# Patient Record
Sex: MALE | Race: WHITE | NOT HISPANIC OR LATINO | Employment: UNEMPLOYED | URBAN - NONMETROPOLITAN AREA
[De-identification: names, ages, dates, MRNs, and addresses within clinical notes are randomized per-mention and may not be internally consistent; named-entity substitution may affect disease eponyms.]

---

## 2017-01-05 ENCOUNTER — TRANSCRIBE ORDERS (OUTPATIENT)
Dept: ADMINISTRATIVE | Facility: HOSPITAL | Age: 37
End: 2017-01-05

## 2017-01-05 DIAGNOSIS — R06.02 SHORTNESS OF BREATH: Primary | ICD-10-CM

## 2017-01-09 ENCOUNTER — HOSPITAL ENCOUNTER (OUTPATIENT)
Dept: CARDIOLOGY | Facility: HOSPITAL | Age: 37
Discharge: HOME | End: 2017-01-09
Attending: INTERNAL MEDICINE | Admitting: INTERNAL MEDICINE

## 2017-01-09 DIAGNOSIS — R06.02 SHORTNESS OF BREATH: ICD-10-CM

## 2017-01-09 LAB
BH CV ECHO MEAS - ACS: 2.3 CM
BH CV ECHO MEAS - AO ROOT AREA (BSA CORRECTED): 1.6
BH CV ECHO MEAS - AO ROOT AREA: 9.1 CM^2
BH CV ECHO MEAS - AO ROOT DIAM: 3.4 CM
BH CV ECHO MEAS - BSA(HAYCOCK): 2.2 M^2
BH CV ECHO MEAS - BSA: 2.2 M^2
BH CV ECHO MEAS - BZI_BMI: 27.9 KILOGRAMS/M^2
BH CV ECHO MEAS - BZI_METRIC_HEIGHT: 182.9 CM
BH CV ECHO MEAS - BZI_METRIC_WEIGHT: 93.4 KG
BH CV ECHO MEAS - CONTRAST EF 4CH: 45 ML/M^2
BH CV ECHO MEAS - EDV(MOD-SP4): 109 ML
BH CV ECHO MEAS - EF(MOD-SP4): 45 %
BH CV ECHO MEAS - ESV(MOD-SP4): 60 ML
BH CV ECHO MEAS - LA DIMENSION: 3.9 CM
BH CV ECHO MEAS - LA/AO: 1.1
BH CV ECHO MEAS - LV DIASTOLIC VOL/BSA (35-75): 50.5 ML/M^2
BH CV ECHO MEAS - LV SYSTOLIC VOL/BSA (12-30): 27.8 ML/M^2
BH CV ECHO MEAS - LVLD AP4: 8.4 CM
BH CV ECHO MEAS - LVLS AP4: 7.6 CM
BH CV ECHO MEAS - LVOT AREA (M): 3.5 CM^2
BH CV ECHO MEAS - LVOT AREA: 3.3 CM^2
BH CV ECHO MEAS - LVOT DIAM: 2.1 CM
BH CV ECHO MEAS - MV A MAX VEL: 59.7 CM/SEC
BH CV ECHO MEAS - MV E MAX VEL: 55.3 CM/SEC
BH CV ECHO MEAS - MV E/A: 0.93
BH CV ECHO MEAS - PA ACC SLOPE: 2004 CM/SEC^2
BH CV ECHO MEAS - PA ACC TIME: 0.06 SEC
BH CV ECHO MEAS - PA PR(ACCEL): 50.5 MMHG
BH CV ECHO MEAS - SI(MOD-SP4): 22.7 ML/M^2
BH CV ECHO MEAS - SV(MOD-SP4): 49 ML
LV EF 2D ECHO EST: 55 %

## 2017-01-09 PROCEDURE — 93306 TTE W/DOPPLER COMPLETE: CPT

## 2017-01-09 PROCEDURE — 93306 TTE W/DOPPLER COMPLETE: CPT | Performed by: INTERNAL MEDICINE

## 2017-09-22 ENCOUNTER — APPOINTMENT (OUTPATIENT)
Dept: GENERAL RADIOLOGY | Facility: HOSPITAL | Age: 37
End: 2017-09-22

## 2017-09-22 ENCOUNTER — HOSPITAL ENCOUNTER (EMERGENCY)
Facility: HOSPITAL | Age: 37
Discharge: ANOTHER HOSPITAL | End: 2017-09-23
Attending: EMERGENCY MEDICINE | Admitting: EMERGENCY MEDICINE

## 2017-09-22 DIAGNOSIS — I21.4 NSTEMI (NON-ST ELEVATED MYOCARDIAL INFARCTION) (HCC): Primary | ICD-10-CM

## 2017-09-22 PROBLEM — R07.89 ATYPICAL CHEST PAIN: Status: ACTIVE | Noted: 2017-09-22

## 2017-09-22 LAB
ALBUMIN SERPL-MCNC: 4 G/DL (ref 3.5–5)
ALBUMIN/GLOB SERPL: 1.5 G/DL (ref 1.5–2.5)
ALP SERPL-CCNC: 121 U/L (ref 40–129)
ALT SERPL W P-5'-P-CCNC: 33 U/L (ref 10–44)
ANION GAP SERPL CALCULATED.3IONS-SCNC: 7.9 MMOL/L (ref 3.6–11.2)
AST SERPL-CCNC: 33 U/L (ref 10–34)
BASOPHILS # BLD AUTO: 0.04 10*3/MM3 (ref 0–0.3)
BASOPHILS NFR BLD AUTO: 0.4 % (ref 0–2)
BILIRUB SERPL-MCNC: 0.5 MG/DL (ref 0.2–1.8)
BUN BLD-MCNC: 13 MG/DL (ref 7–21)
BUN/CREAT SERPL: 11.1 (ref 7–25)
CALCIUM SPEC-SCNC: 9.1 MG/DL (ref 7.7–10)
CHLORIDE SERPL-SCNC: 101 MMOL/L (ref 99–112)
CHOLEST SERPL-MCNC: 185 MG/DL (ref 0–200)
CO2 SERPL-SCNC: 25.1 MMOL/L (ref 24.3–31.9)
CREAT BLD-MCNC: 1.17 MG/DL (ref 0.43–1.29)
DEPRECATED RDW RBC AUTO: 41.8 FL (ref 37–54)
EOSINOPHIL # BLD AUTO: 0.32 10*3/MM3 (ref 0–0.7)
EOSINOPHIL NFR BLD AUTO: 3.6 % (ref 0–5)
ERYTHROCYTE [DISTWIDTH] IN BLOOD BY AUTOMATED COUNT: 12.4 % (ref 11.5–14.5)
GFR SERPL CREATININE-BSD FRML MDRD: 69 ML/MIN/1.73
GLOBULIN UR ELPH-MCNC: 2.6 GM/DL
GLUCOSE BLD-MCNC: 104 MG/DL (ref 70–110)
HCT VFR BLD AUTO: 44.3 % (ref 42–52)
HDLC SERPL-MCNC: 30 MG/DL (ref 60–100)
HGB BLD-MCNC: 14.7 G/DL (ref 14–18)
HOLD SPECIMEN: NORMAL
HOLD SPECIMEN: NORMAL
IMM GRANULOCYTES # BLD: 0.02 10*3/MM3 (ref 0–0.03)
IMM GRANULOCYTES NFR BLD: 0.2 % (ref 0–0.5)
LDLC SERPL CALC-MCNC: 81 MG/DL (ref 0–100)
LDLC/HDLC SERPL: 2.69 {RATIO}
LYMPHOCYTES # BLD AUTO: 3.34 10*3/MM3 (ref 1–3)
LYMPHOCYTES NFR BLD AUTO: 37.6 % (ref 21–51)
MCH RBC QN AUTO: 31.1 PG (ref 27–33)
MCHC RBC AUTO-ENTMCNC: 33.2 G/DL (ref 33–37)
MCV RBC AUTO: 93.9 FL (ref 80–94)
MONOCYTES # BLD AUTO: 0.77 10*3/MM3 (ref 0.1–0.9)
MONOCYTES NFR BLD AUTO: 8.7 % (ref 0–10)
NEUTROPHILS # BLD AUTO: 4.4 10*3/MM3 (ref 1.4–6.5)
NEUTROPHILS NFR BLD AUTO: 49.5 % (ref 30–70)
OSMOLALITY SERPL CALC.SUM OF ELEC: 268.7 MOSM/KG (ref 273–305)
PLATELET # BLD AUTO: 277 10*3/MM3 (ref 130–400)
PMV BLD AUTO: 9.8 FL (ref 6–10)
POTASSIUM BLD-SCNC: 4.1 MMOL/L (ref 3.5–5.3)
PROT SERPL-MCNC: 6.6 G/DL (ref 6–8)
RBC # BLD AUTO: 4.72 10*6/MM3 (ref 4.7–6.1)
SODIUM BLD-SCNC: 134 MMOL/L (ref 135–153)
TRIGL SERPL-MCNC: 372 MG/DL (ref 0–150)
TROPONIN I SERPL-MCNC: 0.01 NG/ML
TROPONIN I SERPL-MCNC: 0.21 NG/ML
TROPONIN I SERPL-MCNC: 1.22 NG/ML
VLDLC SERPL-MCNC: 74.4 MG/DL
WBC NRBC COR # BLD: 8.89 10*3/MM3 (ref 4.5–12.5)
WHOLE BLOOD HOLD SPECIMEN: NORMAL
WHOLE BLOOD HOLD SPECIMEN: NORMAL

## 2017-09-22 PROCEDURE — 80053 COMPREHEN METABOLIC PANEL: CPT | Performed by: EMERGENCY MEDICINE

## 2017-09-22 PROCEDURE — 71010 HC CHEST PA OR AP: CPT

## 2017-09-22 PROCEDURE — 96375 TX/PRO/DX INJ NEW DRUG ADDON: CPT

## 2017-09-22 PROCEDURE — 93010 ELECTROCARDIOGRAM REPORT: CPT | Performed by: INTERNAL MEDICINE

## 2017-09-22 PROCEDURE — 25010000002 HYDROMORPHONE PER 4 MG: Performed by: EMERGENCY MEDICINE

## 2017-09-22 PROCEDURE — 84484 ASSAY OF TROPONIN QUANT: CPT | Performed by: EMERGENCY MEDICINE

## 2017-09-22 PROCEDURE — 25010000002 ENOXAPARIN PER 10 MG

## 2017-09-22 PROCEDURE — 85025 COMPLETE CBC W/AUTO DIFF WBC: CPT | Performed by: EMERGENCY MEDICINE

## 2017-09-22 PROCEDURE — 71010 XR CHEST 1 VW: CPT | Performed by: RADIOLOGY

## 2017-09-22 PROCEDURE — 96374 THER/PROPH/DIAG INJ IV PUSH: CPT

## 2017-09-22 PROCEDURE — 96376 TX/PRO/DX INJ SAME DRUG ADON: CPT

## 2017-09-22 PROCEDURE — 25010000002 MORPHINE PER 10 MG: Performed by: EMERGENCY MEDICINE

## 2017-09-22 PROCEDURE — 93005 ELECTROCARDIOGRAM TRACING: CPT | Performed by: EMERGENCY MEDICINE

## 2017-09-22 PROCEDURE — 80061 LIPID PANEL: CPT | Performed by: EMERGENCY MEDICINE

## 2017-09-22 PROCEDURE — 25010000002 KETOROLAC TROMETHAMINE PER 15 MG: Performed by: EMERGENCY MEDICINE

## 2017-09-22 PROCEDURE — 99285 EMERGENCY DEPT VISIT HI MDM: CPT

## 2017-09-22 PROCEDURE — 96372 THER/PROPH/DIAG INJ SC/IM: CPT

## 2017-09-22 PROCEDURE — 36415 COLL VENOUS BLD VENIPUNCTURE: CPT

## 2017-09-22 RX ORDER — KETOROLAC TROMETHAMINE 30 MG/ML
15 INJECTION, SOLUTION INTRAMUSCULAR; INTRAVENOUS ONCE
Status: COMPLETED | OUTPATIENT
Start: 2017-09-22 | End: 2017-09-22

## 2017-09-22 RX ORDER — SODIUM CHLORIDE 0.9 % (FLUSH) 0.9 %
10 SYRINGE (ML) INJECTION AS NEEDED
Status: DISCONTINUED | OUTPATIENT
Start: 2017-09-22 | End: 2017-09-23 | Stop reason: HOSPADM

## 2017-09-22 RX ORDER — CIPROFLOXACIN HYDROCHLORIDE 3.5 MG/ML
2 SOLUTION/ DROPS TOPICAL DAILY PRN
Status: ON HOLD | COMMUNITY
End: 2018-01-01

## 2017-09-22 RX ORDER — CARVEDILOL 6.25 MG/1
6.25 TABLET ORAL 2 TIMES DAILY
COMMUNITY
End: 2018-01-01 | Stop reason: HOSPADM

## 2017-09-22 RX ORDER — ASPIRIN 325 MG
325 TABLET ORAL ONCE
Status: COMPLETED | OUTPATIENT
Start: 2017-09-22 | End: 2017-09-22

## 2017-09-22 RX ORDER — TRAZODONE HYDROCHLORIDE 50 MG/1
50 TABLET ORAL NIGHTLY PRN
Status: ON HOLD | COMMUNITY
End: 2018-01-01

## 2017-09-22 RX ORDER — OMEGA-3 FATTY ACIDS/FISH OIL 300-1000MG
1 CAPSULE ORAL 2 TIMES DAILY
Status: ON HOLD | COMMUNITY
End: 2018-01-01

## 2017-09-22 RX ORDER — DIAZEPAM 10 MG/1
10 TABLET ORAL NIGHTLY PRN
COMMUNITY

## 2017-09-22 RX ORDER — BUPROPION HYDROCHLORIDE 300 MG/1
300 TABLET ORAL DAILY
COMMUNITY

## 2017-09-22 RX ORDER — CLOPIDOGREL BISULFATE 75 MG/1
300 TABLET ORAL ONCE
Status: COMPLETED | OUTPATIENT
Start: 2017-09-22 | End: 2017-09-22

## 2017-09-22 RX ORDER — CLOPIDOGREL BISULFATE 75 MG/1
75 TABLET ORAL DAILY
COMMUNITY
End: 2017-09-23 | Stop reason: HOSPADM

## 2017-09-22 RX ORDER — OLOPATADINE HYDROCHLORIDE 1 MG/ML
1 SOLUTION/ DROPS OPHTHALMIC DAILY PRN
COMMUNITY

## 2017-09-22 RX ORDER — HYDROMORPHONE HYDROCHLORIDE 1 MG/ML
0.5 INJECTION, SOLUTION INTRAMUSCULAR; INTRAVENOUS; SUBCUTANEOUS ONCE
Status: COMPLETED | OUTPATIENT
Start: 2017-09-22 | End: 2017-09-22

## 2017-09-22 RX ADMIN — KETOROLAC TROMETHAMINE 15 MG: 30 INJECTION, SOLUTION INTRAMUSCULAR at 15:32

## 2017-09-22 RX ADMIN — HYDROMORPHONE HYDROCHLORIDE 0.5 MG: 1 INJECTION, SOLUTION INTRAMUSCULAR; INTRAVENOUS; SUBCUTANEOUS at 19:55

## 2017-09-22 RX ADMIN — ASPIRIN 325 MG: 325 TABLET ORAL at 10:20

## 2017-09-22 RX ADMIN — CLOPIDOGREL BISULFATE 300 MG: 75 TABLET, FILM COATED ORAL at 19:32

## 2017-09-22 RX ADMIN — ENOXAPARIN SODIUM 120 MG: 60 INJECTION SUBCUTANEOUS at 19:33

## 2017-09-22 RX ADMIN — NITROGLYCERIN 1 INCH: 20 OINTMENT TOPICAL at 15:52

## 2017-09-22 RX ADMIN — MORPHINE SULFATE 4 MG: 4 INJECTION, SOLUTION INTRAMUSCULAR; INTRAVENOUS at 10:56

## 2017-09-22 RX ADMIN — MORPHINE SULFATE 4 MG: 4 INJECTION, SOLUTION INTRAMUSCULAR; INTRAVENOUS at 15:52

## 2017-09-22 RX ADMIN — MORPHINE SULFATE 4 MG: 4 INJECTION, SOLUTION INTRAMUSCULAR; INTRAVENOUS at 21:50

## 2017-09-22 NOTE — NURSING NOTE
ACC REVIEW REPORT: [facility]        PATIENT NAME: Paulie Jordan    PATIENT ID: [id number]    BED: N605    BED TYPE: TELEMETRY    BED GIVEN TO:  SOHAIL MURRAY RN    TIME BED GIVEN:  19:22    YOB: 1980    AGE:  37    GENDER:  MALE    PREVIOUS ADMIT TO [facility]:  YES    PREVIOUS ADMISSION DATE:      PATIENT CLASS:  OUTPATIENT    TODAY'S DATE: 9/22/2017    TRANSFER DATE: 09/22/2017    ETA:  22:30    TRANSFERRING FACILITY:  [facility]    TRANSFERRING FACILITY PHONE # :  [facility] NUMBER:  [phone]       EMERGENCY DEPARTMENT NUMBER:   [phone]    TRANSFERRING MD:  DR. RAPHAEL HERRERA    DATE/TIME REQUEST RECEIVED:  09/22/2017 @ 18:44    [facility] RN:  ABBI MILLER RN     REPORT FROM:  SOHAIL MURRAY RN    TIME REPORT TAKEN: 19:00     DIAGNOSIS:  NSTEMI    REASON FOR TRANSFER TO [facility]:  CARDIOLOGIST NOT ON CALL, PT IS POSITIVE FOR A NSTEMI    TRANSPORTATION:  LOCAL EMS    CLINICAL REASON FOR TRANSFER TO [facility]:  Pt presented this a.m. With c/o bilateral arm pain x 3 hours,  states he took 4 nitroglycerin S.L. At home and had no relief.  He has a long history of cardiac disease, the first troponin was negative at 10:29. Case Management was consulted, the patient uses a quad cane, wheelchair, and shower chair at home.  He lives next door to his parents.  The second troponin was 0.215 at 14:45.  Patient continues to state he has no chest pain.  THE 3rd TROPONIN WAS POSITIVE @ 1.125 AT 17:29.      CLINICAL INFORMATION    HEIGHT:  72 inches    WEIGHT:  270 lbs    ALLERGIES:  BENEDRYL    WRIGHT:  NKDA    INFECTIOUS DISEASE:  NONE    ISOLATION:  NONE    1ST VITAL SIGNS:   TIME:  19:00  TEMP: 97.8   PULSE: 73   B/P:  141/92  RESP:  18,  99% SAT ON ROOM AIR.    LAST VITAL SIGNS:  TIME:    TEMP:    PULSE:    B/P:    RESP:      LAB INFORMATION:  ,  K+ 4.1, CHLORIDE 101, BUN 13, Hgb. 14.7,  Hct 44.3,  WBC 8.89,  PLTS.  277.     CULTURE INFORMATION:  No cultures have been drawn      MEDS/IV FLUIDS:  IV ACCESS PER A  # 20 GA ANGIO IN THE LEFT HAND TO SALINE LOCK.                                   MEDICATIONS;  10:15   mg p.o.   10:49 MORPHINE 4 mg IV,  10:51  O2 @ 2L/NC,  15:26 TORADOL 15mg IV,  15:40 NITROSTAT 1 INCH TO CHEST WALL,  15:40 MORPHINE 4 mg IV.    CARDIAC SYSTEM:    CHEST PAIN:  PATIENT DESCRIBED THE PAIN AS BILATERAL ARM PAIN, NOT CHEST PAIN    RATE:  Pt. Did not rate his arm pain.     SCALE:      RHYTHM:  NORMAL SINUS RHYTHM     EKG READING @ 09:53  NSR SEPTAL INFARCT, AGE INDERTERMINTE    Is patient taking or has patient been given any drugs that could increase bleeding?    (Plavix, Brilinta, Effient, Eliquis, Xarelto, Warfarin, Integrilin, Angiomax)    DRUG:       DOSE/FREQUENCY:      CARDIAC ENZYMES:    DATE:    TIME:    CK:    CKMB:    RITA:    TROP:      DATE:    TIME:    CK:    CKMB:    RITA:    TROP:        HEART CATH:  08/22/2016 WITH DR. PITTMAN AT [facility] CATH LAB    HEART CATH DATE:  08/22/2016    HEART CATH RESULTS:  CORONARY STENT PLACEMENT  LAD:    RCA:    CX:    LMAIN:     EF:      SWAN: NO    SITE:    SIZE:     DATE INSERTED:       ARTLINE: NO    SITE:    SIZE:    DATE INSERTED:      SHEATH:  NO    SITE:    SIZE:    DATE INSERTED:          VASOSEAL:  NO    SITE:    DATE INSERTED:      EXTERNAL PACEMAKER: NO    RATE:    EXT PACER ON:      MODE:  N/A    DATE INSERTED:    OUTPUT SETTING:    SENSITIVITY SETTING:    SENSITIVITY TYPE:      IABP:  NO    SITE:    AUG PRESSURE:     DATE INSERTED:      CARDIAC NOTES:  THIS PT HAS A POSITIVE CARDIAC HISTORY;  CARDIAC CATH 08/2016 RESULTING IN CORONARY STENT PLACEMENT,  HYPERCHOLESTEROLEMIA,  HTN,  GOUT,  LEUKEMIA, BONE MARROW TRANSPLANT, 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       RESPIRATORY SYSTEM:    LUNG SOUNDS:    CLEAR:  YES  CRACKLES:    WHEEZES:    RHONCHI:    DIMINISHED:      ABG DATE:  N/A        ABG TIME:      ABG RESULTS:    PH:    PO2:    PCO2:    HCO3:    O2 SAT:        ETT:  NO    ETT SIZE:      ORAL:      NASAL:      SECURED AT  MEASUREMENT (CM):       ON VENTILATOR:  NO    TV:    FI02:    RATE:    PEEP:      OXYGEN:  YES    O2 SAT:  97-98 SAT ON ROOM AIR    ADMINISTRATION ROUTE:  2L/NC    IMAGING FINDINGS:      PNEUMO LOCATION:      PNEUMO SIZE:      PNEUMO CHEST TUBE SEAL TYPE:      RADIOLOGY RESULTS:      RESPIRATORY STATUS:        CNS/MUSCULOSKELETAL    ALERT AND ORIENTED:    PERSON:  YES  PLACE: YES  TIME:  YES    INJURY:  WHERE:      ELIECER COMA SCALE:    E:  4  M:  6  V:  5    STROKE SCALE:  N/A    SIZE OF HEMORRHAGE:      SYMPTOMS: (CHOOSE APPROPRIATE)    ASPHASIA:    ATAXIA:    DYSARTHRIA:    DYSPHASIA:    HEADACHE:    PARALYSIS:    SEIZURE:    SYNCOPE:    VERTIGO:    VISION CHANGE:         EXTREMITY WEAKNESS:  NONE    LEFT ARM:    RIGHT ARM:    LEFT LEG:    RIGHT LEG:      CAT SCAN RESULTS:      MRI RESULTS:      CNS/MUSCULOSKELETAL NOTES:  THE PATIENT IS ALERT AND ORIENTED, HE IS COOPERATIVE WITH STAFF AND CONVERSES APPROPRIATELY.       GI//GY      ABDOMINAL PAIN:  NO    VOMITING:  NO    DIARRHEA:  NO    NAUSEA:  NO    BOWEL SOUNDS:  ACTIVE    OCCULT STOOL:  N/A    VAGINAL BLEEDING:  N/A    TESTICULAR PAIN:  NO    HEMATURIA:  NO    NG TUBE:  NO    SIZE:    DATE INSERTED:        ULTRASOUND:  NOT DONE    ULTRASOUND RESULTS:        ACUTE ABDOMEN:  NO    ACUTE ABDOMEN RESULTS:        CT SCAN:  NO    CT SCAN RESULTS:        GI//GY NOTES:  THE PATIENT IS ON A REGULAR DIET.      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     OTHER SYMPTOM NOTES:      ADDITIONAL NOTES:  Pt is a  male, he has never smoked or used smokeless tobacco products, he uses no alcohol products and denies any recreational drug use.           Jael Enamorado RN  9/22/2017  7:21 PM

## 2017-09-23 ENCOUNTER — APPOINTMENT (OUTPATIENT)
Dept: CARDIOLOGY | Facility: HOSPITAL | Age: 37
End: 2017-09-23
Attending: INTERNAL MEDICINE

## 2017-09-23 ENCOUNTER — APPOINTMENT (OUTPATIENT)
Dept: NUCLEAR MEDICINE | Facility: HOSPITAL | Age: 37
End: 2017-09-23

## 2017-09-23 ENCOUNTER — HOSPITAL ENCOUNTER (INPATIENT)
Facility: HOSPITAL | Age: 37
LOS: 1 days | Discharge: HOME | End: 2017-09-23
Attending: FAMILY MEDICINE | Admitting: HOSPITALIST

## 2017-09-23 VITALS
BODY MASS INDEX: 38.78 KG/M2 | HEIGHT: 71 IN | DIASTOLIC BLOOD PRESSURE: 85 MMHG | WEIGHT: 277 LBS | OXYGEN SATURATION: 97 % | RESPIRATION RATE: 16 BRPM | SYSTOLIC BLOOD PRESSURE: 112 MMHG | TEMPERATURE: 97.5 F | HEART RATE: 72 BPM

## 2017-09-23 VITALS
HEART RATE: 71 BPM | WEIGHT: 270 LBS | DIASTOLIC BLOOD PRESSURE: 94 MMHG | TEMPERATURE: 97.7 F | BODY MASS INDEX: 36.57 KG/M2 | HEIGHT: 72 IN | SYSTOLIC BLOOD PRESSURE: 132 MMHG | OXYGEN SATURATION: 99 % | RESPIRATION RATE: 18 BRPM

## 2017-09-23 DIAGNOSIS — I21.4 NSTEMI (NON-ST ELEVATED MYOCARDIAL INFARCTION) (HCC): Primary | ICD-10-CM

## 2017-09-23 PROBLEM — Z85.6 HISTORY OF LEUKEMIA: Status: ACTIVE | Noted: 2017-09-23

## 2017-09-23 PROBLEM — T45.1X5A NEUROPATHY DUE TO CHEMOTHERAPEUTIC DRUG (HCC): Status: ACTIVE | Noted: 2017-09-23

## 2017-09-23 PROBLEM — G62.0 NEUROPATHY DUE TO CHEMOTHERAPEUTIC DRUG (HCC): Status: ACTIVE | Noted: 2017-09-23

## 2017-09-23 LAB
ANION GAP SERPL CALCULATED.3IONS-SCNC: 13 MMOL/L (ref 3–11)
BUN BLD-MCNC: 13 MG/DL (ref 9–23)
BUN/CREAT SERPL: 10.8 (ref 7–25)
CA-I SERPL ISE-MCNC: 1.14 MMOL/L (ref 1.12–1.32)
CALCIUM SPEC-SCNC: 8.9 MG/DL (ref 8.7–10.4)
CHLORIDE SERPL-SCNC: 101 MMOL/L (ref 99–109)
CO2 SERPL-SCNC: 22 MMOL/L (ref 20–31)
CREAT BLD-MCNC: 1.2 MG/DL (ref 0.6–1.3)
DEPRECATED RDW RBC AUTO: 43 FL (ref 37–54)
ERYTHROCYTE [DISTWIDTH] IN BLOOD BY AUTOMATED COUNT: 12.5 % (ref 11.3–14.5)
GFR SERPL CREATININE-BSD FRML MDRD: 67 ML/MIN/1.73
GLUCOSE BLD-MCNC: 102 MG/DL (ref 70–100)
HCT VFR BLD AUTO: 45.5 % (ref 38.9–50.9)
HGB BLD-MCNC: 15.5 G/DL (ref 13.1–17.5)
MAGNESIUM SERPL-MCNC: 1.8 MG/DL (ref 1.3–2.7)
MCH RBC QN AUTO: 31.9 PG (ref 27–31)
MCHC RBC AUTO-ENTMCNC: 34.1 G/DL (ref 32–36)
MCV RBC AUTO: 93.6 FL (ref 80–99)
PLATELET # BLD AUTO: 254 10*3/MM3 (ref 150–450)
PMV BLD AUTO: 9.8 FL (ref 6–12)
POTASSIUM BLD-SCNC: 3.6 MMOL/L (ref 3.5–5.5)
RBC # BLD AUTO: 4.86 10*6/MM3 (ref 4.2–5.76)
SODIUM BLD-SCNC: 136 MMOL/L (ref 132–146)
TROPONIN I SERPL-MCNC: 3.47 NG/ML
TROPONIN I SERPL-MCNC: 4.4 NG/ML
TSH SERPL DL<=0.05 MIU/L-ACNC: 4.08 MIU/ML (ref 0.35–5.35)
WBC NRBC COR # BLD: 7.09 10*3/MM3 (ref 3.5–10.8)

## 2017-09-23 PROCEDURE — 93005 ELECTROCARDIOGRAM TRACING: CPT | Performed by: NURSE PRACTITIONER

## 2017-09-23 PROCEDURE — 25010000002 MORPHINE SULFATE (PF) 2 MG/ML SOLUTION: Performed by: HOSPITALIST

## 2017-09-23 PROCEDURE — 25010000002 FENTANYL CITRATE (PF) 100 MCG/2ML SOLUTION: Performed by: INTERNAL MEDICINE

## 2017-09-23 PROCEDURE — B2151ZZ FLUOROSCOPY OF LEFT HEART USING LOW OSMOLAR CONTRAST: ICD-10-PCS | Performed by: INTERNAL MEDICINE

## 2017-09-23 PROCEDURE — 93458 L HRT ARTERY/VENTRICLE ANGIO: CPT | Performed by: INTERNAL MEDICINE

## 2017-09-23 PROCEDURE — A9540 TC99M MAA: HCPCS | Performed by: HOSPITALIST

## 2017-09-23 PROCEDURE — 93010 ELECTROCARDIOGRAM REPORT: CPT | Performed by: INTERNAL MEDICINE

## 2017-09-23 PROCEDURE — 25010000002 HEPARIN (PORCINE) PER 1000 UNITS: Performed by: INTERNAL MEDICINE

## 2017-09-23 PROCEDURE — C1769 GUIDE WIRE: HCPCS | Performed by: INTERNAL MEDICINE

## 2017-09-23 PROCEDURE — 82330 ASSAY OF CALCIUM: CPT | Performed by: NURSE PRACTITIONER

## 2017-09-23 PROCEDURE — S0260 H&P FOR SURGERY: HCPCS | Performed by: INTERNAL MEDICINE

## 2017-09-23 PROCEDURE — 0 TECHNETIUM ALBUMIN AGGREGATED: Performed by: HOSPITALIST

## 2017-09-23 PROCEDURE — C1894 INTRO/SHEATH, NON-LASER: HCPCS | Performed by: INTERNAL MEDICINE

## 2017-09-23 PROCEDURE — 0 IOPAMIDOL PER 1 ML: Performed by: INTERNAL MEDICINE

## 2017-09-23 PROCEDURE — 84443 ASSAY THYROID STIM HORMONE: CPT | Performed by: NURSE PRACTITIONER

## 2017-09-23 PROCEDURE — A9558 XE133 XENON 10MCI: HCPCS | Performed by: HOSPITALIST

## 2017-09-23 PROCEDURE — 99236 HOSP IP/OBS SAME DATE HI 85: CPT | Performed by: HOSPITALIST

## 2017-09-23 PROCEDURE — 84484 ASSAY OF TROPONIN QUANT: CPT | Performed by: EMERGENCY MEDICINE

## 2017-09-23 PROCEDURE — 4A023N7 MEASUREMENT OF CARDIAC SAMPLING AND PRESSURE, LEFT HEART, PERCUTANEOUS APPROACH: ICD-10-PCS | Performed by: INTERNAL MEDICINE

## 2017-09-23 PROCEDURE — 25010000002 MIDAZOLAM PER 1 MG: Performed by: INTERNAL MEDICINE

## 2017-09-23 PROCEDURE — 0 XENON XE 133: Performed by: HOSPITALIST

## 2017-09-23 PROCEDURE — B2111ZZ FLUOROSCOPY OF MULTIPLE CORONARY ARTERIES USING LOW OSMOLAR CONTRAST: ICD-10-PCS | Performed by: INTERNAL MEDICINE

## 2017-09-23 PROCEDURE — 78582 LUNG VENTILAT&PERFUS IMAGING: CPT

## 2017-09-23 PROCEDURE — 85027 COMPLETE CBC AUTOMATED: CPT | Performed by: NURSE PRACTITIONER

## 2017-09-23 PROCEDURE — 80048 BASIC METABOLIC PNL TOTAL CA: CPT | Performed by: NURSE PRACTITIONER

## 2017-09-23 PROCEDURE — 83735 ASSAY OF MAGNESIUM: CPT | Performed by: NURSE PRACTITIONER

## 2017-09-23 PROCEDURE — 84484 ASSAY OF TROPONIN QUANT: CPT | Performed by: NURSE PRACTITIONER

## 2017-09-23 RX ORDER — CITALOPRAM 40 MG/1
40 TABLET ORAL DAILY
Status: DISCONTINUED | OUTPATIENT
Start: 2017-09-23 | End: 2017-09-23 | Stop reason: HOSPADM

## 2017-09-23 RX ORDER — ASPIRIN 325 MG
325 TABLET ORAL DAILY
Status: DISCONTINUED | OUTPATIENT
Start: 2017-09-23 | End: 2017-09-23

## 2017-09-23 RX ORDER — ISOSORBIDE MONONITRATE 60 MG/1
60 TABLET, EXTENDED RELEASE ORAL DAILY
Status: DISCONTINUED | OUTPATIENT
Start: 2017-09-23 | End: 2017-09-23 | Stop reason: HOSPADM

## 2017-09-23 RX ORDER — ASPIRIN 81 MG/1
81 TABLET, CHEWABLE ORAL DAILY
Status: DISCONTINUED | OUTPATIENT
Start: 2017-09-23 | End: 2017-09-23 | Stop reason: HOSPADM

## 2017-09-23 RX ORDER — ISOSORBIDE MONONITRATE 30 MG/1
30 TABLET, EXTENDED RELEASE ORAL DAILY
Status: DISCONTINUED | OUTPATIENT
Start: 2017-09-23 | End: 2017-09-23

## 2017-09-23 RX ORDER — ROSUVASTATIN CALCIUM 20 MG/1
40 TABLET, COATED ORAL DAILY
Qty: 30 TABLET | Refills: 11 | Status: SHIPPED | OUTPATIENT
Start: 2017-09-23 | End: 2018-01-01 | Stop reason: HOSPADM

## 2017-09-23 RX ORDER — OXYCODONE HYDROCHLORIDE AND ACETAMINOPHEN 5; 325 MG/1; MG/1
1 TABLET ORAL EVERY 6 HOURS PRN
Status: DISCONTINUED | OUTPATIENT
Start: 2017-09-23 | End: 2017-09-23 | Stop reason: HOSPADM

## 2017-09-23 RX ORDER — FENTANYL CITRATE 50 UG/ML
INJECTION, SOLUTION INTRAMUSCULAR; INTRAVENOUS AS NEEDED
Status: DISCONTINUED | OUTPATIENT
Start: 2017-09-23 | End: 2017-09-23 | Stop reason: HOSPADM

## 2017-09-23 RX ORDER — CARVEDILOL 6.25 MG/1
6.25 TABLET ORAL EVERY 12 HOURS SCHEDULED
Status: DISCONTINUED | OUTPATIENT
Start: 2017-09-23 | End: 2017-09-23 | Stop reason: HOSPADM

## 2017-09-23 RX ORDER — SODIUM CHLORIDE 0.9 % (FLUSH) 0.9 %
1-10 SYRINGE (ML) INJECTION AS NEEDED
Status: DISCONTINUED | OUTPATIENT
Start: 2017-09-23 | End: 2017-09-23 | Stop reason: HOSPADM

## 2017-09-23 RX ORDER — MORPHINE SULFATE 2 MG/ML
2 INJECTION, SOLUTION INTRAMUSCULAR; INTRAVENOUS
Status: DISCONTINUED | OUTPATIENT
Start: 2017-09-23 | End: 2017-09-23

## 2017-09-23 RX ORDER — CLOPIDOGREL BISULFATE 75 MG/1
75 TABLET ORAL DAILY
Status: DISCONTINUED | OUTPATIENT
Start: 2017-09-23 | End: 2017-09-23

## 2017-09-23 RX ORDER — ROSUVASTATIN CALCIUM 20 MG/1
20 TABLET, COATED ORAL DAILY
Status: DISCONTINUED | OUTPATIENT
Start: 2017-09-23 | End: 2017-09-23

## 2017-09-23 RX ORDER — ONDANSETRON 2 MG/ML
4 INJECTION INTRAMUSCULAR; INTRAVENOUS EVERY 6 HOURS PRN
Status: DISCONTINUED | OUTPATIENT
Start: 2017-09-23 | End: 2017-09-23 | Stop reason: HOSPADM

## 2017-09-23 RX ORDER — BUPROPION HYDROCHLORIDE 150 MG/1
300 TABLET ORAL DAILY
Status: DISCONTINUED | OUTPATIENT
Start: 2017-09-23 | End: 2017-09-23 | Stop reason: HOSPADM

## 2017-09-23 RX ORDER — MIDAZOLAM HYDROCHLORIDE 1 MG/ML
INJECTION INTRAMUSCULAR; INTRAVENOUS AS NEEDED
Status: DISCONTINUED | OUTPATIENT
Start: 2017-09-23 | End: 2017-09-23 | Stop reason: HOSPADM

## 2017-09-23 RX ORDER — PANTOPRAZOLE SODIUM 40 MG/1
40 TABLET, DELAYED RELEASE ORAL EVERY MORNING
Status: DISCONTINUED | OUTPATIENT
Start: 2017-09-23 | End: 2017-09-23 | Stop reason: HOSPADM

## 2017-09-23 RX ORDER — MORPHINE SULFATE 30 MG/1
30 TABLET, FILM COATED, EXTENDED RELEASE ORAL EVERY 12 HOURS SCHEDULED
Status: DISCONTINUED | OUTPATIENT
Start: 2017-09-23 | End: 2017-09-23 | Stop reason: HOSPADM

## 2017-09-23 RX ORDER — ISOSORBIDE MONONITRATE 30 MG/1
60 TABLET, EXTENDED RELEASE ORAL DAILY
Qty: 30 TABLET | Refills: 6 | Status: SHIPPED | OUTPATIENT
Start: 2017-09-23 | End: 2018-01-01 | Stop reason: HOSPADM

## 2017-09-23 RX ORDER — FEBUXOSTAT 40 MG/1
40 TABLET, FILM COATED ORAL DAILY
Status: DISCONTINUED | OUTPATIENT
Start: 2017-09-23 | End: 2017-09-23 | Stop reason: HOSPADM

## 2017-09-23 RX ORDER — LIDOCAINE HYDROCHLORIDE 10 MG/ML
INJECTION, SOLUTION INFILTRATION; PERINEURAL AS NEEDED
Status: DISCONTINUED | OUTPATIENT
Start: 2017-09-23 | End: 2017-09-23 | Stop reason: HOSPADM

## 2017-09-23 RX ORDER — ROSUVASTATIN CALCIUM 20 MG/1
40 TABLET, COATED ORAL DAILY
Status: DISCONTINUED | OUTPATIENT
Start: 2017-09-23 | End: 2017-09-23 | Stop reason: HOSPADM

## 2017-09-23 RX ORDER — SODIUM CHLORIDE 9 MG/ML
100 INJECTION, SOLUTION INTRAVENOUS CONTINUOUS
Status: ACTIVE | OUTPATIENT
Start: 2017-09-23 | End: 2017-09-23

## 2017-09-23 RX ORDER — ONDANSETRON 4 MG/1
4 TABLET, FILM COATED ORAL EVERY 6 HOURS PRN
Status: DISCONTINUED | OUTPATIENT
Start: 2017-09-23 | End: 2017-09-23 | Stop reason: HOSPADM

## 2017-09-23 RX ORDER — LEVOTHYROXINE SODIUM 0.05 MG/1
50 TABLET ORAL DAILY
Status: DISCONTINUED | OUTPATIENT
Start: 2017-09-23 | End: 2017-09-23 | Stop reason: HOSPADM

## 2017-09-23 RX ORDER — NITROGLYCERIN 20 MG/100ML
5-200 INJECTION INTRAVENOUS
Status: DISCONTINUED | OUTPATIENT
Start: 2017-09-23 | End: 2017-09-23

## 2017-09-23 RX ADMIN — BUPROPION HYDROCHLORIDE 300 MG: 150 TABLET, FILM COATED, EXTENDED RELEASE ORAL at 08:36

## 2017-09-23 RX ADMIN — OXYCODONE AND ACETAMINOPHEN 1 TABLET: 5; 325 TABLET ORAL at 10:26

## 2017-09-23 RX ADMIN — Medication 1 DOSE: at 14:56

## 2017-09-23 RX ADMIN — MORPHINE SULFATE 30 MG: 30 TABLET, EXTENDED RELEASE ORAL at 08:37

## 2017-09-23 RX ADMIN — LEVOTHYROXINE SODIUM 50 MCG: 50 TABLET ORAL at 04:19

## 2017-09-23 RX ADMIN — ROSUVASTATIN CALCIUM 40 MG: 20 TABLET, FILM COATED ORAL at 08:35

## 2017-09-23 RX ADMIN — FEBUXOSTAT 40 MG: 40 TABLET ORAL at 10:27

## 2017-09-23 RX ADMIN — CITALOPRAM HYDROBROMIDE 40 MG: 40 TABLET ORAL at 08:36

## 2017-09-23 RX ADMIN — TICAGRELOR 180 MG: 90 TABLET ORAL at 08:36

## 2017-09-23 RX ADMIN — ISOSORBIDE MONONITRATE 60 MG: 60 TABLET, EXTENDED RELEASE ORAL at 08:36

## 2017-09-23 RX ADMIN — SODIUM CHLORIDE 100 ML/HR: 9 INJECTION, SOLUTION INTRAVENOUS at 10:26

## 2017-09-23 RX ADMIN — OXYCODONE AND ACETAMINOPHEN 1 TABLET: 5; 325 TABLET ORAL at 16:52

## 2017-09-23 RX ADMIN — MORPHINE SULFATE 2 MG: 2 INJECTION, SOLUTION INTRAMUSCULAR; INTRAVENOUS at 04:20

## 2017-09-23 RX ADMIN — ASPIRIN 81 MG 81 MG: 81 TABLET ORAL at 08:36

## 2017-09-23 RX ADMIN — XENON XE-133 18.44 MILLICURIE: 10 GAS RESPIRATORY (INHALATION) at 14:30

## 2017-09-23 RX ADMIN — CARVEDILOL 6.25 MG: 6.25 TABLET, FILM COATED ORAL at 04:19

## 2017-09-23 RX ADMIN — NITROGLYCERIN 5 MCG/MIN: 20 INJECTION INTRAVENOUS at 04:21

## 2017-09-23 RX ADMIN — PANTOPRAZOLE SODIUM 40 MG: 40 TABLET, DELAYED RELEASE ORAL at 04:20

## 2017-09-23 NOTE — CONSULTS
Date of Hospital Visit: 17  Encounter Provider: Devorah Hightower MD  Place of Service: [facility]  Patient Name: Paulie Jordan  :1980  Referral Provider: No ref. provider found  Primary Care Provider: Cesar Neri MD    Chief complaint/Reason for Consultation: Non-ST elevation MI     Problem List:  1. Coronary artery disease:  1. Hospitalization 2016 found to be NSTEMI  2. Cardiac catheterization 2016: Single vessel- CAD with total ostial occlusion of LAD. PTCA/stent of LAD with 3.5 x 33 mm ALISHA reducing 100% stenosis to 0%. EF 40-45%   3. Echocardiogram 2016: EF 55%. Trace MR. Trace TR.  4. ER presentation with anginal symptoms 2017, ruled in for non-ST elevation MI.  2. Hypertension   3. Hyperlipidemia   4. Neuropathy, secondary to Chemotherapy   5. History of Leukemia  1. Diagnosed  with remission , S/P chemo and radiation  2. S/P Bone Marrow Transplant   3. Wheelchair bound due to spinal involvement  6. Hypothyroid, on chronic replacement therapy.  7. IBS   8. Depression    History of Present Illness:  The patient is a pleasant 37-year-old male with above-mentioned medical history.  He is well known to me.  Patient states that he was in his usual state of health until yesterday morning when shortly after waking up he started experiencing left-sided upper arm pain which was quite intense and progressive.  He took up to 4 sublingual nitroglycerin tablets with significant relief.  He contacted his PCP office and was advised to go to the emergency department for further evaluation.  Upon arrival to the ER he did not have any ongoing arm pain or chest discomfort however they decided to trend his troponins and observe him in the ER where he started to rule in.  We were contacted in the evening requesting transfer.  At this time I recommended reloading with Plavix and full dose of Lovenox.  Patient was subsequently transferred to [facility]  [facility] in the early hours of the morning.  Reportedly upon arrival he was complaining of very mild retrosternal discomfort and left arm discomfort and was started on low-dose nitroglycerin IV.  Subsequently his symptoms completely resolved.  He has rested well and denies any ongoing chest pain arm pain or shortness of breath this morning.  His lifestyle is sedentary, he is disabled due to his history of leukemia  and participate in minor household chores.  Review of systems is negative for recent fever chills abdominal pain nausea vomiting diarrhea constipation or urinary symptoms.  No symptoms of stroke.  All other review of systems are also negative.    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       Past Surgical History:   Procedure Laterality Date   • BONE MARROW TRANSPLANT     • CARDIAC CATHETERIZATION N/A 8/22/2016    Procedure: Left Heart Cath;  Surgeon: Devorah Hightower MD;  Location: [facility] CATH INVASIVE LOCATION;  Service:    • CORONARY STENT PLACEMENT         Prescriptions Prior to Admission   Medication Sig Dispense Refill Last Dose   • aspirin 325 MG tablet Take 1 tablet by mouth daily.   9/22/2017 at 0700   • buPROPion XL (WELLBUTRIN XL) 300 MG 24 hr tablet Take 300 mg by mouth Daily.   9/22/2017 at 0700   • carvedilol (COREG) 6.25 MG tablet Take 6.25 mg by mouth Every 12 (Twelve) Hours.   9/22/2017 at 0700   • ciprofloxacin (CILOXAN) 0.3 % ophthalmic solution Administer 2 drops to both eyes Daily As Needed.   Unknown at Unknown time   • citalopram (CeleXA) 40 MG tablet Take 40 mg by mouth daily.   9/22/2017 at 0700   • clopidogrel (PLAVIX) 75 MG tablet Take 75 mg by mouth Daily.   9/22/2017 at 0700   • diazePAM (VALIUM) 10 MG tablet Take 10 mg by mouth Every Night.   9/21/2017 at PM   • febuxostat (ULORIC) 40 MG tablet Take 40 mg by mouth daily.   9/22/2017 at 0700   • isosorbide  mononitrate (IMDUR) 30 MG 24 hr tablet Take 1 tablet by mouth daily. 30 tablet 6 9/22/2017 at 0700   • levothyroxine (SYNTHROID, LEVOTHROID) 50 MCG tablet Take 50 mcg by mouth daily.   9/22/2017 at 0700   • morphine (MS CONTIN) 30 MG 12 hr tablet Take 30 mg by mouth 2 (Two) Times a Day.   9/22/2017 at 0700   • nitroglycerin (NITROSTAT) 0.4 MG SL tablet 1 under the tongue as needed for angina, may repeat q5mins for up three doses (Patient taking differently: Place 0.4 mg under the tongue Every 5 (Five) Minutes As Needed. 1 under the tongue as needed for angina, may repeat q5mins for up three doses) 100 tablet 3 Unknown at Unknown time   • olopatadine (PATANOL) 0.1 % ophthalmic solution Administer 1 drop to both eyes Daily As Needed for Allergies.   Unknown at Unknown time   • Omega 3 1000 MG capsule Take 1 capsule by mouth 2 (Two) Times a Day.   9/22/2017 at 0700   • omeprazole (PriLOSEC) 20 MG capsule Take 20 mg by mouth daily.   9/22/2017 at 0700   • rosuvastatin (CRESTOR) 20 MG tablet Take 20 mg by mouth Daily.   9/22/2017 at 0700   • traZODone (DESYREL) 50 MG tablet Take 50 mg by mouth At Night As Needed for Sleep.   9/20/2017 at PM       Social History     Social History   • Marital status:      Spouse name: N/A   • Number of children: N/A   • Years of education: N/A     Occupational History   • Not on file.     Social History Main Topics   • Smoking status: Never Smoker   • Smokeless tobacco: Not on file   • Alcohol use No   • Drug use: No   • Sexual activity: Not on file     Other Topics Concern   • Not on file     Social History Narrative   • No narrative on file       REVIEW OF SYSTEMS:   ROS  12 point ROS was performed and is Negative except as outlined in HPI    Objective:     Vitals:    09/23/17 0211 09/23/17 0215 09/23/17 0419 09/23/17 0445   BP: 146/84 136/79 137/99 119/85   BP Location: Left arm Right arm     Pulse: 85  77 80   Resp: 20   20   Temp: 97.8 °F (36.6 °C)   97.8 °F (36.6 °C)  "  Temrc: Oral   Oral   SpO2: 99%      Weight: 277 lb (126 kg)      Height: 71\" (180.3 cm)        Body mass index is 38.63 kg/(m^2).  Flowsheet Rows         First Filed Value    Admission Height  71\" (180.3 cm) Documented at 09/23/2017 0211    Admission Weight  277 lb (126 kg) Documented at 09/23/2017 0211          Physical Exam   General: No acute distress, well-developed and well-nourished.    Skin: Skin is warm and dry. No obvious cyanosis, erythema or pallor.   HEENT: Atraumatic, normocephalic, no conjunctival pallor, no scleral icterus.   Neck: Supple, no JVD. Normal carotid upstrokes, no bruits.    Chest:No respiratory distres No chest wall tenderness. Breath sounds are normal. No wheezes,  rhonchi or rales.  Cardiovascular: Normal S1 and S2, no murmer, gallop or rub. PMI is not displaced.    Pulses:Radial and pedal pulses are 2+ and symmetric although left radial is slightly weaker than the right.. Right Waqar test is normal.    Abdomen: Soft, non tender, normal bowel sounds.   Musculoskeletal/Extremities:  No clubbing, cyanosis or edema. No gross deformity.   Neurological: Alert and oriented to person, place, and time, no gross focal deficits.   Psychiatric: Normal mood and affect.Speech and behavior is normal.    Lab Review:                  Results from last 7 days  Lab Units 09/22/17  1029   SODIUM mmol/L 134*   POTASSIUM mmol/L 4.1   CHLORIDE mmol/L 101   CO2 mmol/L 25.1   BUN mg/dL 13   CREATININE mg/dL 1.17   GLUCOSE mg/dL 104   CALCIUM mg/dL 9.1       Results from last 7 days  Lab Units 09/23/17  0010 09/22/17  1729 09/22/17  1445   TROPONIN I ng/mL 3.466* 1.215* 0.215*       Results from last 7 days  Lab Units 09/23/17  0450   WBC 10*3/mm3 7.09   HEMOGLOBIN g/dL 15.5   HEMATOCRIT % 45.5   PLATELETS 10*3/mm3 254               Results from last 7 days  Lab Units 09/22/17  1729   CHOLESTEROL mg/dL 185   TRIGLYCERIDES mg/dL 372*   HDL CHOL mg/dL 30*       EKG:Normal sinus rhythm, poor R-wave " progression, no acute ST/T changes.      Assessment:   Patient with known CAD, status post previous MI and stenting of the LAD now presents with recurrent ACS and ruled in for non-ST elevation MI.  Hypertension.  Dyslipidemia with persistent hypertriglyceridemia.  Obesity.    Plan:   1. He received subcutaneous Lovenox and was reloaded with Plavix.  We will continue dual antiplatelet therapy and rest of the current medications.  2. Left heart catheterization was recommended for further evaluation of coronary anatomy, this will be followed by further intervention depending on findings.  3. The procedure was explained to the patient/family extensively. Indications, benefits, risks and alternatives were discussed. The patient understands well, and wishes to proceed.   4. Further management to be based on findings of the procedure and clinical course.  5. Thank you for this consultation.    Devorah Hightower MD, FACC, [facility]

## 2017-09-23 NOTE — DISCHARGE SUMMARY
HOSPITAL MEDICINE DISCHARGE SUMMARY    Date of Admission: 9/23/2017  Date of Discharge:  9/23/2017    Discharge Diagnoses:  Principal Problem:    NSTEMI (non-ST elevated myocardial infarction)  Active Problems:    Hypertension    Hypercholesterolemia    Coronary artery disease involving native coronary artery of native heart    Cardiomyopathy    History of leukemia    Neuropathy due to chemotherapeutic drug      Presenting Problem/History of Present Illness from admission  Patient is a 37 year old male, who presented to [facility] with complaints of chest pain. Patient reports that his pain started yesterday afternoon. He rated the pain at a 4/10, with nothing improving the pain, and nothing making the pain worse, and the pain radiated to his left arm. Patient denies shortness of breath or abdominal pain. He reports that he took 4 nitroglycerin tablets and the pain did not improve, so he was advised by his PCP to seek further evaluation in the emergency department. While there, patient was found to have elevated troponins 0.215, 1.215, & 3.466. Additionally, patient has a past medical history of NSTEMI (with 1 stent 8/16), HTN, hyperlipidemia, IBS, and history of leukemia (remission since 2000). Patient was transferred to [facility], and will be admitted to the hospital medicine service for further evaluation and treatment.     Discharge Day HPI:  Pt reports feeling well. Off of nitro gtt, he reports mild retrosternal chest discomfort. No radiation. Does have increased chronic pain from his leukemia, he reports.     Hospital Course    Pt was admitted for NSTEMI and underwent LHC on 9/23 and was found to have non-obstructive CAD with previous LAD stent patent. Anti-anginal medications adjusted- asa at low-dose, increased dose of Imdur. Plavix changed to ticagrelor. Pt provided with physical scripts upon discharge. V/Q scan obtained to eval for possible PE with low-probability.      Procedures Performed  Procedure(s):  Left Heart Cath       Consults:   Consults     Date and Time Order Name Status Description    9/23/2017 0327 Inpatient Consult to Cardiology Completed           Pertinent Test Results:   Lab Results (last 7 days)     Procedure Component Value Units Date/Time    CBC (No Diff) [[id number]]  (Abnormal) Collected:  09/23/17 0450    Specimen:  Blood Updated:  09/23/17 0640     WBC 7.09 10*3/mm3      RBC 4.86 10*6/mm3      Hemoglobin 15.5 g/dL      Hematocrit 45.5 %      MCV 93.6 fL      MCH 31.9 (H) pg      MCHC 34.1 g/dL      RDW 12.5 %      RDW-SD 43.0 fl      MPV 9.8 fL      Platelets 254 10*3/mm3     TSH [[id number]]  (Normal) Collected:  09/23/17 0450    Specimen:  Blood Updated:  09/23/17 0701     TSH 4.081 mIU/mL     Magnesium [[id number]]  (Normal) Collected:  09/23/17 0450    Specimen:  Blood Updated:  09/23/17 0701     Magnesium 1.8 mg/dL     Calcium, Ionized [[id number]]  (Normal) Collected:  09/23/17 0450    Specimen:  Blood Updated:  09/23/17 0834     Ionized Calcium 1.14 mmol/L     Troponin [[id number]]  (Abnormal) Collected:  09/23/17 0450    Specimen:  Blood Updated:  09/23/17 0913     Troponin I 4.403 (C) ng/mL     Basic Metabolic Panel [[id number]]  (Abnormal) Collected:  09/23/17 0450    Specimen:  Blood Updated:  09/23/17 0947     Glucose 102 (H) mg/dL      BUN 13 mg/dL      Creatinine 1.20 mg/dL      Sodium 136 mmol/L      Potassium 3.6 mmol/L      Chloride 101 mmol/L      CO2 22.0 mmol/L      Calcium 8.9 mg/dL      eGFR Non African Amer 67 mL/min/1.73      BUN/Creatinine Ratio 10.8     Anion Gap 13.0 (H) mmol/L     Narrative:       [employer] Guidelines    Stage     Description        GFR  1         Normal or High     90+  2         Mild decrease      60-89  3         Moderate decrease  30-59  4         Severe decrease    15-29  5         Kidney failure     <15        Imaging Results (all)     Procedure Component Value Units Date/Time    NM Lung  "Ventilation Perfusion [[id number]] Collected:  09/23/17 1528     Updated:  09/23/17 1534    Narrative:       EXAMINATION: NM LUNG VENTILATION PERFUSION-     INDICATION: elevated troponin and r/o PE; I21.4-Non-ST elevation  (NSTEMI) myocardial infarction         TECHNIQUE: Radiopharmaceutical: 18.4 mCi of xenon-133 ventilation. 5.3  mm technetium 99m MAA, IV.     COMPARISON: Portable chest x-ray 9/22/2017 from [facility]     FINDINGS: Wash-in and equilibrium images appear normal except for a  mildly foreshortened appearance of the right lung, corresponding to  patient's mildly elevated right hemidiaphragm. There is mildly delayed  washout from the right lung base. The perfusion exam shows no perfusion  defects or even significant heterogeneity of perfusion. Study is  considered low probably for pulmonary embolus..    Impression:       1. Low probability for pulmonary embolus.  2. Foreshortened appearance of the right lung on both perfusion and  ventilation images, which appears to reflect an elevated right  hemidiaphragm. This is more noticeable than on yesterday's chest  radiograph, and follow-up radiograph might be considered to evaluate for  possibility of increasing diaphragmatic elevation.         This report was finalized on 9/23/2017 3:32 PM by DR. Kerwin Aguilar MD.             Physical Exam on Discharge:    /85 (BP Location: Left arm, Patient Position: Lying)  Pulse 72  Temp 97.5 °F (36.4 °C) (Oral)   Resp 16  Ht 71\" (180.3 cm)  Wt 277 lb (126 kg)  SpO2 97%  BMI 38.63 kg/m2  General - NAD, pt was laying in bed comfortably  HEENT - EOMI, PERRL, oropharynx clear, hearing intact  CVS - RRR, S1 S2, no rubs, gallops or murmurs, 2s cap refill, no peripheral edema, 2+ pulses  Resp - CTAB, no crackles and wheezes   GI - +BS, soft, ND/NT  MSK - No joint pain or joint edema  Neuro - Awake and oriented, follows commands, interactive, moves all extremities equally    Discharge Disposition  Home or Self " Care    Discharge Medications   Paulie Jordan   Home Medication Instructions NIMISHA:[id number]    Printed on:09/23/17 1952   Medication Information                      aspirin 81 MG tablet  Take 1 tablet by mouth Daily.             buPROPion XL (WELLBUTRIN XL) 300 MG 24 hr tablet  Take 300 mg by mouth Daily.             carvedilol (COREG) 6.25 MG tablet  Take 6.25 mg by mouth Every 12 (Twelve) Hours.             ciprofloxacin (CILOXAN) 0.3 % ophthalmic solution  Administer 2 drops to both eyes Daily As Needed.             citalopram (CeleXA) 40 MG tablet  Take 40 mg by mouth daily.             diazePAM (VALIUM) 10 MG tablet  Take 10 mg by mouth Every Night.             febuxostat (ULORIC) 40 MG tablet  Take 40 mg by mouth daily.             isosorbide mononitrate (IMDUR) 30 MG 24 hr tablet  Take 2 tablets by mouth Daily.             levothyroxine (SYNTHROID, LEVOTHROID) 50 MCG tablet  Take 50 mcg by mouth daily.             morphine (MS CONTIN) 30 MG 12 hr tablet  Take 30 mg by mouth 2 (Two) Times a Day.             nitroglycerin (NITROSTAT) 0.4 MG SL tablet  1 under the tongue as needed for angina, may repeat q5mins for up three doses             olopatadine (PATANOL) 0.1 % ophthalmic solution  Administer 1 drop to both eyes Daily As Needed for Allergies.             Omega 3 1000 MG capsule  Take 1 capsule by mouth 2 (Two) Times a Day.             omeprazole (PriLOSEC) 20 MG capsule  Take 20 mg by mouth daily.             rosuvastatin (CRESTOR) 20 MG tablet  Take 2 tablets by mouth Daily.             ticagrelor (BRILINTA) 90 MG tablet tablet  Take 1 tablet by mouth 2 (Two) Times a Day.             traZODone (DESYREL) 50 MG tablet  Take 50 mg by mouth At Night As Needed for Sleep.                 Discharge Diet   Cardiac       Activity at Discharge  As tolerated    Follow-up Appointments  No future appointments.  Additional Instructions for the Follow-ups that You Need to Schedule     Discharge Follow-Up With  Specified Provider    As directed    To:  Dr. Hightower   Follow Up:  2 Weeks       Discharge Follow-Up With Specified Provider    As directed    To:  Dr. Hightower   Follow Up:  2 Weeks       Discharge Follow-up with PCP    As directed    Follow Up Details:  Within 1 week       Discharge Follow-up with PCP    As directed    Follow Up Details:  Within 1 week                 Test Results Pending at Discharge      CC to PCP    Time: Discharge 35 min

## 2017-09-23 NOTE — PLAN OF CARE
Problem: Patient Care Overview (Adult)  Goal: Plan of Care Review  Outcome: Outcome(s) achieved Date Met:  09/23/17 09/23/17 1644   Coping/Psychosocial Response Interventions   Plan Of Care Reviewed With patient   Outcome Evaluation   Outcome Summary/Follow up Plan pt. being discharged home today       Goal: Adult Individualization and Mutuality  Outcome: Outcome(s) achieved Date Met:  09/23/17  Goal: Discharge Needs Assessment  Outcome: Outcome(s) achieved Date Met:  09/23/17    Problem: Acute Coronary Syndrome (ACS) (Adult)  Goal: Signs and Symptoms of Listed Potential Problems Will be Absent or Manageable (Acute Coronary Syndrome)  Outcome: Outcome(s) achieved Date Met:  09/23/17

## 2017-09-23 NOTE — PLAN OF CARE
Problem: Patient Care Overview (Adult)  Goal: Plan of Care Review    09/23/17 0548   Coping/Psychosocial Response Interventions   Plan Of Care Reviewed With patient   Patient Care Overview   Progress no change   Outcome Evaluation   Outcome Summary/Follow up Plan Ntg gtt begun d/t c/o persistent left arm/chest discomfort. VSS. NSR.

## 2017-09-23 NOTE — H&P
[facility] Medicine Services  HISTORY AND PHYSICAL    Primary Care Physician: Cesar Neri MD    Subjective     Chief Complaint: Chest Pain    History of Present Illness:   Patient is a 37 year old male, who presented to [facility] with complaints of chest pain. Patient reports that his pain started yesterday afternoon. He rated the pain at a 4/10, with nothing improving the pain, and nothing making the pain worse, and the pain radiated to his left arm. Patient denies shortness of breath or abdominal pain. He reports that he took 4 nitroglycerin tablets and the pain did not improve, so he was advised by his PCP to seek further evaluation in the emergency department. While there, patient was found to have elevated troponins 0.215, 1.215, & 3.466. Additionally, patient has a past medical history of NSTEMI (with 1 stent 8/16), HTN, hyperlipidemia, IBS, and history of leukemia (remission since 2000). Patient was transferred to [facility], and will be admitted to the hospital medicine service for further evaluation and treatment.     THIS IS A PLEASANT 36 YO M WITH HISTORY OF LEUKEMIA AT THE AGE OF 19 AND RELAPSE BY THE TIME HE WAS 20. hE UNDERWENT A bm TRANSPLANT AND HAD A REACTION/REJECTION WHICH LEFT HIM DEBILITATED AND WC BOUND SINCE. HE HAD BEEN ON IMMUNOSUPPRESSIVE THERAPY IN THE PAST, BUT IS NO LONGER ON IT.HE UNFORTUNATELY HAS CAD AND WAS ADMITTED HERE WITH NSTEMI IN 8/2017 AND HAD LHC WITH PTCA/STENTING. PT HAD BEEN COMPLAINT WITH MEDS AND DOING WELL UNTIL YESTERDAY MORNING WHEN HE NOTICE BILAT ARM PAIN THAT RADIATED TO HIS SHOULDER BLADE. HE CALLED HIS PCP WHO RECOMMENDED EVAL IN THE ED. TROP WAS INITIALLY 0.215 AND HAS TRENDED UP TO >3 NOW. HIS EKG IS UNCHANGED. HE HAS TAKEN NTG AT HOME 4 TIMES WITHOUT ANY RELIEVE. CURRENTLY HAVE VAGUE CHEST DISCOMFORT/TIGHTNESS THAT RADIATES TO LEFT ARM. PT LOADED WITH ASA, PLAVIC, AND LEVENOX AT OSH. CURRENTLY  HEMODYNAMICALLY STABLE. NO FEVER OR CHILLS.    Review of Systems   Constitutional: Negative for chills, diaphoresis, fatigue and fever.   Respiratory: Negative for cough, shortness of breath and wheezing.    Cardiovascular: Positive for chest pain. Negative for palpitations and leg swelling.   Gastrointestinal: Negative for abdominal pain, nausea and vomiting.   Genitourinary: Negative for dysuria, frequency and urgency.   Musculoskeletal: Negative for arthralgias and myalgias.   Skin: Negative for color change, pallor, rash and wound.   Neurological: Negative for dizziness, weakness, numbness and headaches.   Psychiatric/Behavioral: Negative for agitation and confusion.   All other systems reviewed and are negative.     Otherwise complete 10 system ROS performed and negative except as mentioned in the HPI.    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       Past Surgical History:   Procedure Laterality Date   • BONE MARROW TRANSPLANT     • CARDIAC CATHETERIZATION N/A 8/22/2016    Procedure: Left Heart Cath;  Surgeon: Devorah Hightower MD;  Location: [facility] CATH INVASIVE LOCATION;  Service:    • CORONARY STENT PLACEMENT         Family History   Problem Relation Age of Onset   • Hypertension Father    • Cancer Father      Colon   • Arthritis Mother    • No Known Problems Sister        Social History     Social History   • Marital status:      Spouse name: N/A   • Number of children: N/A   • Years of education: N/A     Occupational History   • Not on file.     Social History Main Topics   • Smoking status: Never Smoker   • Smokeless tobacco: Not on file   • Alcohol use No   • Drug use: No   • Sexual activity: Not on file     Other Topics Concern   • Not on file     Social History Narrative   • No narrative on file       Medications:  Prescriptions Prior to Admission   Medication Sig Dispense Refill  Last Dose   • aspirin 325 MG tablet Take 1 tablet by mouth daily.   9/22/2017 at 0700   • buPROPion XL (WELLBUTRIN XL) 300 MG 24 hr tablet Take 300 mg by mouth Daily.   9/22/2017 at 0700   • carvedilol (COREG) 6.25 MG tablet Take 6.25 mg by mouth Every 12 (Twelve) Hours.   9/22/2017 at 0700   • ciprofloxacin (CILOXAN) 0.3 % ophthalmic solution Administer 2 drops to both eyes Daily As Needed.   Unknown at Unknown time   • citalopram (CeleXA) 40 MG tablet Take 40 mg by mouth daily.   9/22/2017 at 0700   • clopidogrel (PLAVIX) 75 MG tablet Take 75 mg by mouth Daily.   9/22/2017 at 0700   • diazePAM (VALIUM) 10 MG tablet Take 10 mg by mouth Every Night.   9/21/2017 at PM   • febuxostat (ULORIC) 40 MG tablet Take 40 mg by mouth daily.   9/22/2017 at 0700   • isosorbide mononitrate (IMDUR) 30 MG 24 hr tablet Take 1 tablet by mouth daily. 30 tablet 6 9/22/2017 at 0700   • levothyroxine (SYNTHROID, LEVOTHROID) 50 MCG tablet Take 50 mcg by mouth daily.   9/22/2017 at 0700   • morphine (MS CONTIN) 30 MG 12 hr tablet Take 30 mg by mouth 2 (Two) Times a Day.   9/22/2017 at 0700   • nitroglycerin (NITROSTAT) 0.4 MG SL tablet 1 under the tongue as needed for angina, may repeat q5mins for up three doses (Patient taking differently: Place 0.4 mg under the tongue Every 5 (Five) Minutes As Needed. 1 under the tongue as needed for angina, may repeat q5mins for up three doses) 100 tablet 3 Unknown at Unknown time   • olopatadine (PATANOL) 0.1 % ophthalmic solution Administer 1 drop to both eyes Daily As Needed for Allergies.   Unknown at Unknown time   • Omega 3 1000 MG capsule Take 1 capsule by mouth 2 (Two) Times a Day.   9/22/2017 at 0700   • omeprazole (PriLOSEC) 20 MG capsule Take 20 mg by mouth daily.   9/22/2017 at 0700   • rosuvastatin (CRESTOR) 20 MG tablet Take 20 mg by mouth Daily.   9/22/2017 at 0700   • traZODone (DESYREL) 50 MG tablet Take 50 mg by mouth At Night As Needed for Sleep.   9/20/2017 at PM  "      Allergies:  Allergies   Allergen Reactions   • Benadryl [Diphenhydramine] Itching     IV benadryl per patient         Objective     Physical Exam:  Vital Signs: /79 (BP Location: Right arm)  Pulse 85  Temp 97.8 °F (36.6 °C) (Oral)   Resp 20  Ht 71\" (180.3 cm)  Wt 277 lb (126 kg)  SpO2 99%  BMI 38.63 kg/m2  Physical Exam   Constitutional: He is oriented to person, place, and time. He appears well-developed and well-nourished.   HENT:   Head: Normocephalic and atraumatic.   Eyes: EOM are normal. Pupils are equal, round, and reactive to light.   Neck: Normal range of motion. Neck supple. No JVD present.   Cardiovascular: Normal rate, regular rhythm, normal heart sounds and intact distal pulses.  Exam reveals no gallop and no friction rub.    No murmur heard.  Pulmonary/Chest: Effort normal and breath sounds normal. No respiratory distress. He has no wheezes. He has no rales. He exhibits no tenderness.   Abdominal: Soft. Bowel sounds are normal. He exhibits no distension. There is no tenderness. There is no rebound and no guarding.   Musculoskeletal: Normal range of motion. He exhibits no edema, tenderness or deformity.   Neurological: He is alert and oriented to person, place, and time.   Skin: Skin is warm and dry. No rash noted. No erythema. No pallor.   Psychiatric: He has a normal mood and affect. His behavior is normal. Judgment and thought content normal.   Nursing note and vitals reviewed.    NAD  RRR  CTAB  ABD SOFT/NT +BS  RLE >LLE, CALVES NT  NO FACIAL ASYMMETRY    Results Reviewed:    Results from last 7 days  Lab Units 09/22/17  1029   WBC 10*3/mm3 8.89   HEMOGLOBIN g/dL 14.7   PLATELETS 10*3/mm3 277       Results from last 7 days  Lab Units 09/22/17  1029   SODIUM mmol/L 134*   POTASSIUM mmol/L 4.1   CO2 mmol/L 25.1   CREATININE mg/dL 1.17   GLUCOSE mg/dL 104   CALCIUM mg/dL 9.1       I have personally reviewed and interpreted available lab data, radiology studies and ECG obtained at " time of admission.     Assessment / Plan     Problem List:   Hospital Problem List     * (Principal)NSTEMI (non-ST elevated myocardial infarction)    Hypertension    Hypercholesterolemia    Coronary artery disease involving native coronary artery of native heart    Overview Addendum 8/24/2016  9:06 AM by ROBBIN Kunz     1.  [facility] 8-22-16  · Single-vessel CAD with total ostial occlusion of the LAD.  · Successful thrombectomy followed by PTCA/stenting of the LAD with 3.5 x 33 mm drug-coated stent reducing the 100% stenosis to 0%.  · No other significant CAD.  · Moderate left ventricular systolic dysfunction, ejection fraction 40-45%.  · Normal hemodynamics.            Cardiomyopathy    Overview Signed 8/23/2016  5:06 PM by ROBBIN Kunz     ·   · Moderate left ventricular systolic dysfunction, ejection fraction 40-45%.  · Normal hemodynamics.         History of leukemia    Neuropathy due to chemotherapeutic drug          Assessment:  - NSTEMI    Plan:  - Admit to telemetry  - VS q4h  - Consult to Cardiology   - Previously seen by Dr. Hightower  - Trend troponin  - AM Labs  - Lipid profile done at [facility]  - NPO until evaluation by cardiology  - AM EKG  - Pain management  - Antiemetics  - Continue home medications when able  - Nitro drip for chest pain  - 324mg ASA, 300mg Plavix, and Lovenox 1mg/kg at [facility]   - continue    CASE DISCUSSED WITH APRN AND PT, PLANS AS OUTLINED ABOVE. OF NOTE, PT DOES HAVE LE SIZE DISCREPANCY WHICH PT STATED IS CHRONIC, BUT NO DVT HAS EVER BEEN RULED OUT. DUE TO TO HIGH RISK FOR DVT, I WILL GET DUPLEX.     DVT prophylaxis: TEDs/SCDs/Lovenox  Code Status: Full  Admission Status: Patient will be admitted to INPATIENT status due to the need for care which can only be reasonably provided in an hospital setting such as aggressive/expedited ancillary services and/or consultation services, the necessity for IV medications, close physician monitoring and/or the possible need for procedures.   In such, I feel patient’s risk for adverse outcomes and need for care warrant INPATIENT evaluation and predict the patient’s care encounter to likely last beyond 2 midnights.       Evelyn Artis, APRN 09/23/17 2:51 AM

## 2017-09-25 ENCOUNTER — DOCUMENTATION (OUTPATIENT)
Dept: CARDIAC REHAB | Facility: HOSPITAL | Age: 37
End: 2017-09-25

## 2017-09-25 NOTE — PROGRESS NOTES
Order received for Phase II Cardiac Rehab. Staff will fax referral to [facility] for scheduling.

## 2017-09-25 NOTE — NURSING NOTE
ACC UPDATE REPORT: [facility]          PATIENT NAME: Paulie Jordan    PATIENT ID: [id number]    BED: N605    BED TYPE: TELEMETRY    BED GIVEN TO: SOHAIL MURRAY RN    TIME BED GIVEN: 19:22 ON 09/23/2017    VITAL SIGNS:   TIME:    TEMP:    PULSE:    B/P:    RESP:        Change in condition/New information:       Notes:  THE PT WAS A TRANSFER TO [facility] BECAUSE THERE WERE NO AVAILABLE ICU BEDS AT [facility], NOT BECAUSE CARDIOLOGY WAS NOT ON CALL AS WAS ORIGINALLY STATED IN THE INITIAL REPORT.       Updated ETA:       [facility] RN:   ABBI MILLER RN     Report from:       Time report taken:

## 2017-10-17 ENCOUNTER — DOCUMENTATION (OUTPATIENT)
Dept: CARDIAC REHAB | Facility: HOSPITAL | Age: 37
End: 2017-10-17

## 2017-10-17 ENCOUNTER — TELEPHONE (OUTPATIENT)
Dept: CARDIAC REHAB | Facility: HOSPITAL | Age: 37
End: 2017-10-17

## 2017-10-17 NOTE — PROGRESS NOTES
Patient stated he is not interested in doing the cardiac rehab program .  He is going to [facility] were he has a exercise program there and at home.

## 2018-01-01 ENCOUNTER — TELEPHONE (OUTPATIENT)
Dept: CARDIAC REHAB | Facility: HOSPITAL | Age: 38
End: 2018-01-01

## 2018-01-01 ENCOUNTER — DOCUMENTATION (OUTPATIENT)
Dept: CARDIAC REHAB | Facility: HOSPITAL | Age: 38
End: 2018-01-01

## 2018-01-01 ENCOUNTER — ANESTHESIA EVENT (OUTPATIENT)
Dept: PERIOP | Facility: HOSPITAL | Age: 38
End: 2018-01-01

## 2018-01-01 ENCOUNTER — HOSPITAL ENCOUNTER (EMERGENCY)
Facility: HOSPITAL | Age: 38
Discharge: ANOTHER HOSPITAL | End: 2018-05-28
Admitting: NURSE PRACTITIONER

## 2018-01-01 ENCOUNTER — APPOINTMENT (OUTPATIENT)
Dept: GENERAL RADIOLOGY | Facility: HOSPITAL | Age: 38
End: 2018-01-01

## 2018-01-01 ENCOUNTER — HOSPITAL ENCOUNTER (OUTPATIENT)
Dept: CARDIOLOGY | Facility: HOSPITAL | Age: 38
Discharge: HOME | End: 2018-06-20
Admitting: NURSE PRACTITIONER

## 2018-01-01 ENCOUNTER — EPISODE CHANGES (OUTPATIENT)
Dept: CASE MANAGEMENT | Facility: OTHER | Age: 38
End: 2018-01-01

## 2018-01-01 ENCOUNTER — HOSPITAL ENCOUNTER (INPATIENT)
Facility: HOSPITAL | Age: 38
LOS: 2 days | Discharge: HOME | End: 2018-03-18
Attending: INTERNAL MEDICINE | Admitting: INTERNAL MEDICINE

## 2018-01-01 ENCOUNTER — APPOINTMENT (OUTPATIENT)
Dept: CARDIOLOGY | Facility: HOSPITAL | Age: 38
End: 2018-01-01

## 2018-01-01 ENCOUNTER — HOSPITAL ENCOUNTER (OUTPATIENT)
Facility: HOSPITAL | Age: 38
Setting detail: OBSERVATION
Discharge: ANOTHER HOSPITAL | End: 2018-03-16
Attending: EMERGENCY MEDICINE | Admitting: INTERNAL MEDICINE

## 2018-01-01 ENCOUNTER — ANCILLARY PROCEDURE (OUTPATIENT)
Dept: PERIOP | Facility: HOSPITAL | Age: 38
End: 2018-01-01

## 2018-01-01 ENCOUNTER — HOSPITAL ENCOUNTER (INPATIENT)
Facility: HOSPITAL | Age: 38
LOS: 15 days | Discharge: NURSING, REHAB OR LONG-TERM CARE | End: 2018-06-13
Attending: INTERNAL MEDICINE | Admitting: THORACIC SURGERY (CARDIOTHORACIC VASCULAR SURGERY)

## 2018-01-01 ENCOUNTER — HOSPITAL ENCOUNTER (EMERGENCY)
Facility: HOSPITAL | Age: 38
End: 2018-12-24
Attending: EMERGENCY MEDICINE | Admitting: INTERNAL MEDICINE

## 2018-01-01 ENCOUNTER — HOSPITAL ENCOUNTER (INPATIENT)
Facility: HOSPITAL | Age: 38
LOS: 2 days | Discharge: HOME | End: 2018-02-05
Attending: INTERNAL MEDICINE | Admitting: HOSPITALIST

## 2018-01-01 ENCOUNTER — APPOINTMENT (OUTPATIENT)
Dept: CARDIOLOGY | Facility: HOSPITAL | Age: 38
End: 2018-01-01
Attending: INTERNAL MEDICINE

## 2018-01-01 ENCOUNTER — OFFICE VISIT (OUTPATIENT)
Dept: CARDIOLOGY | Facility: HOSPITAL | Age: 38
End: 2018-01-01

## 2018-01-01 ENCOUNTER — ANESTHESIA (OUTPATIENT)
Dept: PERIOP | Facility: HOSPITAL | Age: 38
End: 2018-01-01

## 2018-01-01 ENCOUNTER — HOSPITAL ENCOUNTER (EMERGENCY)
Facility: HOSPITAL | Age: 38
Discharge: ANOTHER HOSPITAL | End: 2018-02-02
Attending: FAMILY MEDICINE | Admitting: FAMILY MEDICINE

## 2018-01-01 ENCOUNTER — TELEPHONE (OUTPATIENT)
Dept: CARDIAC SURGERY | Facility: CLINIC | Age: 38
End: 2018-01-01

## 2018-01-01 ENCOUNTER — APPOINTMENT (OUTPATIENT)
Dept: PULMONOLOGY | Facility: HOSPITAL | Age: 38
End: 2018-01-01

## 2018-01-01 ENCOUNTER — PATIENT OUTREACH (OUTPATIENT)
Dept: CASE MANAGEMENT | Facility: OTHER | Age: 38
End: 2018-01-01

## 2018-01-01 VITALS
BODY MASS INDEX: 41.65 KG/M2 | TEMPERATURE: 98 F | RESPIRATION RATE: 18 BRPM | HEART RATE: 71 BPM | SYSTOLIC BLOOD PRESSURE: 110 MMHG | WEIGHT: 307.5 LBS | HEIGHT: 72 IN | DIASTOLIC BLOOD PRESSURE: 68 MMHG | OXYGEN SATURATION: 96 %

## 2018-01-01 VITALS
TEMPERATURE: 94 F | OXYGEN SATURATION: 96 % | RESPIRATION RATE: 22 BRPM | BODY MASS INDEX: 35.29 KG/M2 | SYSTOLIC BLOOD PRESSURE: 68 MMHG | HEIGHT: 74 IN | WEIGHT: 275 LBS | DIASTOLIC BLOOD PRESSURE: 52 MMHG | HEART RATE: 66 BPM

## 2018-01-01 VITALS
WEIGHT: 275 LBS | HEIGHT: 72 IN | DIASTOLIC BLOOD PRESSURE: 92 MMHG | HEART RATE: 87 BPM | OXYGEN SATURATION: 99 % | TEMPERATURE: 97.6 F | RESPIRATION RATE: 20 BRPM | SYSTOLIC BLOOD PRESSURE: 128 MMHG | BODY MASS INDEX: 37.25 KG/M2

## 2018-01-01 VITALS
DIASTOLIC BLOOD PRESSURE: 98 MMHG | WEIGHT: 282.6 LBS | HEART RATE: 70 BPM | BODY MASS INDEX: 38.28 KG/M2 | RESPIRATION RATE: 14 BRPM | TEMPERATURE: 97.4 F | HEIGHT: 72 IN | OXYGEN SATURATION: 97 % | SYSTOLIC BLOOD PRESSURE: 137 MMHG

## 2018-01-01 VITALS
RESPIRATION RATE: 18 BRPM | DIASTOLIC BLOOD PRESSURE: 70 MMHG | HEIGHT: 72 IN | WEIGHT: 290 LBS | SYSTOLIC BLOOD PRESSURE: 120 MMHG | BODY MASS INDEX: 39.28 KG/M2 | HEART RATE: 77 BPM | TEMPERATURE: 98.2 F | OXYGEN SATURATION: 98 %

## 2018-01-01 VITALS
OXYGEN SATURATION: 99 % | BODY MASS INDEX: 38.65 KG/M2 | SYSTOLIC BLOOD PRESSURE: 116 MMHG | TEMPERATURE: 98.2 F | WEIGHT: 270 LBS | DIASTOLIC BLOOD PRESSURE: 58 MMHG | RESPIRATION RATE: 18 BRPM | HEIGHT: 70 IN | HEART RATE: 82 BPM

## 2018-01-01 VITALS
RESPIRATION RATE: 16 BRPM | OXYGEN SATURATION: 99 % | TEMPERATURE: 97.2 F | BODY MASS INDEX: 38.47 KG/M2 | SYSTOLIC BLOOD PRESSURE: 104 MMHG | WEIGHT: 284 LBS | DIASTOLIC BLOOD PRESSURE: 71 MMHG | HEIGHT: 72 IN | HEART RATE: 83 BPM

## 2018-01-01 VITALS
HEART RATE: 69 BPM | WEIGHT: 287.04 LBS | OXYGEN SATURATION: 98 % | BODY MASS INDEX: 38.88 KG/M2 | HEIGHT: 72 IN | DIASTOLIC BLOOD PRESSURE: 77 MMHG | TEMPERATURE: 98.8 F | SYSTOLIC BLOOD PRESSURE: 104 MMHG | RESPIRATION RATE: 20 BRPM

## 2018-01-01 DIAGNOSIS — Z78.9 IMPAIRED MOBILITY AND ADLS: ICD-10-CM

## 2018-01-01 DIAGNOSIS — IMO0001 CLASS 3 OBESITY DUE TO EXCESS CALORIES WITH SERIOUS COMORBIDITY AND BODY MASS INDEX (BMI) OF 40.0 TO 44.9 IN ADULT: ICD-10-CM

## 2018-01-01 DIAGNOSIS — R94.31 PROLONGED Q-T INTERVAL ON ECG: ICD-10-CM

## 2018-01-01 DIAGNOSIS — I24.9 ACUTE CORONARY SYNDROME (HCC): Primary | ICD-10-CM

## 2018-01-01 DIAGNOSIS — R94.31 PROLONGED Q-T INTERVAL ON ECG: Primary | ICD-10-CM

## 2018-01-01 DIAGNOSIS — I21.4 NSTEMI (NON-ST ELEVATED MYOCARDIAL INFARCTION) (HCC): Primary | ICD-10-CM

## 2018-01-01 DIAGNOSIS — I21.4 NSTEMI (NON-ST ELEVATED MYOCARDIAL INFARCTION) (HCC): ICD-10-CM

## 2018-01-01 DIAGNOSIS — Z74.09 IMPAIRED MOBILITY AND ADLS: ICD-10-CM

## 2018-01-01 DIAGNOSIS — I25.10 CORONARY ARTERY DISEASE INVOLVING NATIVE CORONARY ARTERY OF NATIVE HEART WITHOUT ANGINA PECTORIS: ICD-10-CM

## 2018-01-01 DIAGNOSIS — R07.9 CHEST PAIN, UNSPECIFIED TYPE: Primary | ICD-10-CM

## 2018-01-01 DIAGNOSIS — R07.9 CHEST PAIN, UNSPECIFIED TYPE: ICD-10-CM

## 2018-01-01 DIAGNOSIS — T45.1X5A NEUROPATHY DUE TO CHEMOTHERAPEUTIC DRUG (HCC): ICD-10-CM

## 2018-01-01 DIAGNOSIS — G62.0 NEUROPATHY DUE TO CHEMOTHERAPEUTIC DRUG (HCC): ICD-10-CM

## 2018-01-01 DIAGNOSIS — Z74.09 IMPAIRED FUNCTIONAL MOBILITY, BALANCE, GAIT, AND ENDURANCE: ICD-10-CM

## 2018-01-01 DIAGNOSIS — I25.119 CORONARY ARTERY DISEASE INVOLVING NATIVE CORONARY ARTERY OF NATIVE HEART WITH ANGINA PECTORIS (HCC): Primary | ICD-10-CM

## 2018-01-01 DIAGNOSIS — I21.3 ST ELEVATION MYOCARDIAL INFARCTION (STEMI), UNSPECIFIED ARTERY (HCC): Primary | ICD-10-CM

## 2018-01-01 LAB
6-ACETYL MORPHINE: NEGATIVE
6-ACETYL MORPHINE: NEGATIVE
ABO + RH BLD: NORMAL
ABO GROUP BLD: NORMAL
ABO GROUP BLD: NORMAL
ACT BLD: 109 SECONDS (ref 82–152)
ACT BLD: 114 SECONDS (ref 82–152)
ACT BLD: 279 SECONDS (ref 82–152)
ACT BLD: 312 SECONDS (ref 82–152)
ACT BLD: 340 SECONDS (ref 82–152)
ACT BLD: 351 SECONDS (ref 82–152)
ACT BLD: 444 SECONDS (ref 82–152)
ACT BLD: 472 SECONDS (ref 82–152)
ACT BLD: 510 SECONDS (ref 82–152)
ACT BLD: 521 SECONDS (ref 82–152)
ALBUMIN SERPL-MCNC: 3 G/DL (ref 3.2–4.8)
ALBUMIN SERPL-MCNC: 3.4 G/DL (ref 3.2–4.8)
ALBUMIN SERPL-MCNC: 3.5 G/DL (ref 3.5–5)
ALBUMIN SERPL-MCNC: 3.6 G/DL (ref 3.2–4.8)
ALBUMIN SERPL-MCNC: 3.6 G/DL (ref 3.2–4.8)
ALBUMIN SERPL-MCNC: 3.73 G/DL (ref 3.2–4.8)
ALBUMIN SERPL-MCNC: 3.9 G/DL (ref 3.5–5)
ALBUMIN SERPL-MCNC: 4 G/DL (ref 3.2–4.8)
ALBUMIN SERPL-MCNC: 4.1 G/DL (ref 3.5–5)
ALBUMIN/GLOB SERPL: 1.3 G/DL (ref 1.5–2.5)
ALBUMIN/GLOB SERPL: 1.4 G/DL (ref 1.5–2.5)
ALBUMIN/GLOB SERPL: 1.4 G/DL (ref 1.5–2.5)
ALBUMIN/GLOB SERPL: 1.5 G/DL (ref 1.5–2.5)
ALBUMIN/GLOB SERPL: 1.5 G/DL (ref 1.5–2.5)
ALBUMIN/GLOB SERPL: 1.6 G/DL (ref 1.5–2.5)
ALBUMIN/GLOB SERPL: 1.6 G/DL (ref 1.5–2.5)
ALP SERPL-CCNC: 112 U/L (ref 40–129)
ALP SERPL-CCNC: 115 U/L (ref 40–129)
ALP SERPL-CCNC: 117 U/L (ref 40–129)
ALP SERPL-CCNC: 120 U/L (ref 25–100)
ALP SERPL-CCNC: 125 U/L (ref 25–100)
ALP SERPL-CCNC: 126 U/L (ref 40–129)
ALP SERPL-CCNC: 147 U/L (ref 40–129)
ALT SERPL W P-5'-P-CCNC: 13 U/L (ref 10–44)
ALT SERPL W P-5'-P-CCNC: 31 U/L (ref 10–44)
ALT SERPL W P-5'-P-CCNC: 33 U/L (ref 10–44)
ALT SERPL W P-5'-P-CCNC: 34 U/L (ref 10–44)
ALT SERPL W P-5'-P-CCNC: 34 U/L (ref 10–44)
ALT SERPL W P-5'-P-CCNC: 35 U/L (ref 7–40)
ALT SERPL W P-5'-P-CCNC: 37 U/L (ref 7–40)
AMPHET+METHAMPHET UR QL: NEGATIVE
AMPHET+METHAMPHET UR QL: NEGATIVE
AMYLASE SERPL-CCNC: 25 U/L (ref 28–100)
ANION GAP SERPL CALCULATED.3IONS-SCNC: 10 MMOL/L (ref 3–11)
ANION GAP SERPL CALCULATED.3IONS-SCNC: 13.4 MMOL/L (ref 3.6–11.2)
ANION GAP SERPL CALCULATED.3IONS-SCNC: 16 MMOL/L (ref 3–11)
ANION GAP SERPL CALCULATED.3IONS-SCNC: 4 MMOL/L (ref 3–11)
ANION GAP SERPL CALCULATED.3IONS-SCNC: 6 MMOL/L (ref 3–11)
ANION GAP SERPL CALCULATED.3IONS-SCNC: 6.1 MMOL/L (ref 3.6–11.2)
ANION GAP SERPL CALCULATED.3IONS-SCNC: 6.2 MMOL/L (ref 3.6–11.2)
ANION GAP SERPL CALCULATED.3IONS-SCNC: 6.3 MMOL/L (ref 3.6–11.2)
ANION GAP SERPL CALCULATED.3IONS-SCNC: 7 MMOL/L (ref 3–11)
ANION GAP SERPL CALCULATED.3IONS-SCNC: 7 MMOL/L (ref 3–11)
ANION GAP SERPL CALCULATED.3IONS-SCNC: 7.6 MMOL/L (ref 3.6–11.2)
ANION GAP SERPL CALCULATED.3IONS-SCNC: 8 MMOL/L (ref 3–11)
ANION GAP SERPL CALCULATED.3IONS-SCNC: 9 MMOL/L (ref 3–11)
APTT PPP: 100.1 SECONDS (ref 55–70)
APTT PPP: 130.5 SECONDS (ref 55–70)
APTT PPP: 28.8 SECONDS (ref 23.8–36.1)
APTT PPP: 29.6 SECONDS (ref 23.8–36.1)
APTT PPP: 29.7 SECONDS (ref 24–31)
APTT PPP: 36.4 SECONDS (ref 55–70)
APTT PPP: 38.2 SECONDS (ref 55–70)
APTT PPP: 41.3 SECONDS (ref 55–70)
APTT PPP: 44.2 SECONDS (ref 55–70)
APTT PPP: 47.8 SECONDS (ref 55–70)
APTT PPP: 48.5 SECONDS (ref 55–70)
APTT PPP: 49 SECONDS (ref 55–70)
APTT PPP: 49.9 SECONDS (ref 55–70)
APTT PPP: 52.2 SECONDS (ref 55–70)
APTT PPP: 52.6 SECONDS (ref 55–70)
APTT PPP: 54.1 SECONDS (ref 55–70)
APTT PPP: 54.3 SECONDS (ref 55–70)
APTT PPP: 59.2 SECONDS (ref 55–70)
APTT PPP: 61.5 SECONDS (ref 55–70)
APTT PPP: 64.1 SECONDS (ref 55–70)
APTT PPP: 64.2 SECONDS (ref 23.8–36.1)
APTT PPP: 66 SECONDS (ref 55–70)
APTT PPP: 66.9 SECONDS (ref 55–70)
APTT PPP: 69 SECONDS (ref 55–70)
APTT PPP: 69.7 SECONDS (ref 55–70)
APTT PPP: 70.3 SECONDS (ref 55–70)
APTT PPP: 73.2 SECONDS (ref 55–70)
APTT PPP: 77.6 SECONDS (ref 55–70)
APTT PPP: 77.6 SECONDS (ref 55–70)
APTT PPP: 93.8 SECONDS (ref 55–70)
APTT PPP: <24 SECONDS (ref 24–31)
APTT PPP: >100 SECONDS (ref 23.8–36.1)
ARTERIAL PATENCY WRIST A: ABNORMAL
ARTICHOKE IGE QN: 128 MG/DL (ref 0–130)
AST SERPL-CCNC: 19 U/L (ref 10–34)
AST SERPL-CCNC: 23 U/L (ref 10–34)
AST SERPL-CCNC: 27 U/L (ref 10–34)
AST SERPL-CCNC: 30 U/L (ref 10–34)
AST SERPL-CCNC: 33 U/L (ref 10–34)
AST SERPL-CCNC: 35 U/L (ref 0–33)
AST SERPL-CCNC: 60 U/L (ref 0–33)
ATMOSPHERIC PRESS: ABNORMAL MMHG
BACTERIA SPEC AEROBE CULT: NORMAL
BARBITURATES UR QL SCN: NEGATIVE
BARBITURATES UR QL SCN: NEGATIVE
BASE EXCESS BLDA CALC-SCNC: -1.8 MMOL/L (ref 0–2)
BASE EXCESS BLDA CALC-SCNC: -1.8 MMOL/L (ref 0–2)
BASE EXCESS BLDA CALC-SCNC: -2 MMOL/L (ref -5–5)
BASE EXCESS BLDA CALC-SCNC: -2 MMOL/L (ref -5–5)
BASE EXCESS BLDA CALC-SCNC: -2.2 MMOL/L (ref 0–2)
BASE EXCESS BLDA CALC-SCNC: -4 MMOL/L (ref -5–5)
BASE EXCESS BLDA CALC-SCNC: -4 MMOL/L (ref -5–5)
BASE EXCESS BLDA CALC-SCNC: -4 MMOL/L (ref 0–2)
BASE EXCESS BLDA CALC-SCNC: -4.7 MMOL/L (ref 0–2)
BASE EXCESS BLDA CALC-SCNC: -5.5 MMOL/L (ref 0–2)
BASE EXCESS BLDA CALC-SCNC: 1 MMOL/L (ref -5–5)
BASOPHILS # BLD AUTO: 0.02 10*3/MM3 (ref 0–0.3)
BASOPHILS # BLD AUTO: 0.03 10*3/MM3 (ref 0–0.2)
BASOPHILS # BLD AUTO: 0.04 10*3/MM3 (ref 0–0.2)
BASOPHILS # BLD AUTO: 0.04 10*3/MM3 (ref 0–0.3)
BASOPHILS # BLD AUTO: 0.04 10*3/MM3 (ref 0–0.3)
BASOPHILS # BLD AUTO: 0.05 10*3/MM3 (ref 0–0.3)
BASOPHILS # BLD AUTO: 0.06 10*3/MM3 (ref 0–0.3)
BASOPHILS # BLD AUTO: 0.06 10*3/MM3 (ref 0–0.3)
BASOPHILS NFR BLD AUTO: 0.3 % (ref 0–1)
BASOPHILS NFR BLD AUTO: 0.3 % (ref 0–2)
BASOPHILS NFR BLD AUTO: 0.3 % (ref 0–2)
BASOPHILS NFR BLD AUTO: 0.4 % (ref 0–1)
BASOPHILS NFR BLD AUTO: 0.4 % (ref 0–1)
BASOPHILS NFR BLD AUTO: 0.4 % (ref 0–2)
BASOPHILS NFR BLD AUTO: 0.5 % (ref 0–1)
BASOPHILS NFR BLD AUTO: 0.6 % (ref 0–1)
BASOPHILS NFR BLD AUTO: 0.6 % (ref 0–2)
BASOPHILS NFR BLD AUTO: 0.7 % (ref 0–2)
BASOPHILS NFR BLD AUTO: 0.7 % (ref 0–2)
BDY SITE: ABNORMAL
BENZODIAZ UR QL SCN: POSITIVE
BENZODIAZ UR QL SCN: POSITIVE
BH BB BLOOD EXPIRATION DATE: NORMAL
BH BB BLOOD TYPE BARCODE: 6200
BH BB DISPENSE STATUS: NORMAL
BH BB PRODUCT CODE: NORMAL
BH BB UNIT NUMBER: NORMAL
BH CV ECHO MEAS - AO MAX PG: 3.1 MMHG
BH CV ECHO MEAS - AO ROOT AREA (BSA CORRECTED): 1.1
BH CV ECHO MEAS - AO ROOT AREA (BSA CORRECTED): 1.3
BH CV ECHO MEAS - AO ROOT AREA: 6.1 CM^2
BH CV ECHO MEAS - AO ROOT AREA: 7.5 CM^2
BH CV ECHO MEAS - AO ROOT DIAM: 2.8 CM
BH CV ECHO MEAS - AO ROOT DIAM: 3.1 CM
BH CV ECHO MEAS - AO V2 MAX: 87.5 CM/SEC
BH CV ECHO MEAS - BSA(HAYCOCK): 2.6 M^2
BH CV ECHO MEAS - BSA: 2.4 M^2
BH CV ECHO MEAS - BSA: 2.5 M^2
BH CV ECHO MEAS - BSA: 2.5 M^2
BH CV ECHO MEAS - BZI_BMI: 38.5 KILOGRAMS/M^2
BH CV ECHO MEAS - BZI_BMI: 38.5 KILOGRAMS/M^2
BH CV ECHO MEAS - BZI_BMI: 39.6 KILOGRAMS/M^2
BH CV ECHO MEAS - BZI_METRIC_HEIGHT: 180.3 CM
BH CV ECHO MEAS - BZI_METRIC_HEIGHT: 182.9 CM
BH CV ECHO MEAS - BZI_METRIC_HEIGHT: 182.9 CM
BH CV ECHO MEAS - BZI_METRIC_WEIGHT: 128.8 KG
BH CV ECHO MEAS - CONTRAST EF (2CH): 59.5 ML/M^2
BH CV ECHO MEAS - CONTRAST EF 4CH: 67.3 ML/M^2
BH CV ECHO MEAS - EDV(CUBED): 46.4 ML
BH CV ECHO MEAS - EDV(CUBED): 79.3 ML
BH CV ECHO MEAS - EDV(MOD-SP2): 79 ML
BH CV ECHO MEAS - EDV(MOD-SP4): 104 ML
BH CV ECHO MEAS - EDV(TEICH): 54.2 ML
BH CV ECHO MEAS - EDV(TEICH): 82.9 ML
BH CV ECHO MEAS - EF(CUBED): 25.4 %
BH CV ECHO MEAS - EF(CUBED): 64.3 %
BH CV ECHO MEAS - EF(MOD-SP2): 59.5 %
BH CV ECHO MEAS - EF(MOD-SP4): 62 %
BH CV ECHO MEAS - EF(TEICH): 21 %
BH CV ECHO MEAS - EF(TEICH): 56.1 %
BH CV ECHO MEAS - ESV(CUBED): 28.3 ML
BH CV ECHO MEAS - ESV(CUBED): 34.7 ML
BH CV ECHO MEAS - ESV(MOD-SP2): 32 ML
BH CV ECHO MEAS - ESV(MOD-SP4): 34 ML
BH CV ECHO MEAS - ESV(TEICH): 36.4 ML
BH CV ECHO MEAS - ESV(TEICH): 42.9 ML
BH CV ECHO MEAS - FS: 29.1 %
BH CV ECHO MEAS - FS: 9.3 %
BH CV ECHO MEAS - IVS/LVPW: 0.81
BH CV ECHO MEAS - IVS/LVPW: 1
BH CV ECHO MEAS - IVSD: 0.99 CM
BH CV ECHO MEAS - IVSD: 1.2 CM
BH CV ECHO MEAS - LA DIMENSION: 3.2 CM
BH CV ECHO MEAS - LA/AO: 1.2
BH CV ECHO MEAS - LAT PEAK E' VEL: 11 CM/SEC
BH CV ECHO MEAS - LAT PEAK E' VEL: 9.2 CM/SEC
BH CV ECHO MEAS - LV DIASTOLIC VOL/BSA (35-75): 42.5 ML/M^2
BH CV ECHO MEAS - LV MASS(C)D: 124.9 GRAMS
BH CV ECHO MEAS - LV MASS(C)D: 183.5 GRAMS
BH CV ECHO MEAS - LV MASS(C)DI: 50.5 GRAMS/M^2
BH CV ECHO MEAS - LV MASS(C)DI: 75 GRAMS/M^2
BH CV ECHO MEAS - LV SYSTOLIC VOL/BSA (12-30): 13.9 ML/M^2
BH CV ECHO MEAS - LVIDD: 3.6 CM
BH CV ECHO MEAS - LVIDD: 4.3 CM
BH CV ECHO MEAS - LVIDS: 3 CM
BH CV ECHO MEAS - LVIDS: 3.3 CM
BH CV ECHO MEAS - LVLD AP2: 7.9 CM
BH CV ECHO MEAS - LVLD AP4: 7.5 CM
BH CV ECHO MEAS - LVLS AP2: 7.4 CM
BH CV ECHO MEAS - LVLS AP4: 5.7 CM
BH CV ECHO MEAS - LVOT AREA (M): 3.8 CM^2
BH CV ECHO MEAS - LVOT AREA (M): 4.2 CM^2
BH CV ECHO MEAS - LVOT AREA: 3.6 CM^2
BH CV ECHO MEAS - LVOT AREA: 4.3 CM^2
BH CV ECHO MEAS - LVOT DIAM: 2.2 CM
BH CV ECHO MEAS - LVOT DIAM: 2.3 CM
BH CV ECHO MEAS - LVPWD: 0.9 CM
BH CV ECHO MEAS - LVPWD: 1.2 CM
BH CV ECHO MEAS - MED PEAK E' VEL: 5.8 CM/SEC
BH CV ECHO MEAS - MED PEAK E' VEL: 7.09 CM/SEC
BH CV ECHO MEAS - MV A MAX VEL: 52.8 CM/SEC
BH CV ECHO MEAS - MV A MAX VEL: 78 CM/SEC
BH CV ECHO MEAS - MV DEC TIME: 0.24 SEC
BH CV ECHO MEAS - MV DEC TIME: 0.33 SEC
BH CV ECHO MEAS - MV E MAX VEL: 48.4 CM/SEC
BH CV ECHO MEAS - MV E MAX VEL: 54.3 CM/SEC
BH CV ECHO MEAS - MV E/A: 0.62
BH CV ECHO MEAS - MV E/A: 1
BH CV ECHO MEAS - PA ACC SLOPE: 450.6 CM/SEC^2
BH CV ECHO MEAS - PA ACC SLOPE: 540.7 CM/SEC^2
BH CV ECHO MEAS - PA ACC TIME: 0.12 SEC
BH CV ECHO MEAS - PA ACC TIME: 0.15 SEC
BH CV ECHO MEAS - PA MAX PG: 4.3 MMHG
BH CV ECHO MEAS - PA MAX PG: 5.3 MMHG
BH CV ECHO MEAS - PA PR(ACCEL): 12.5 MMHG
BH CV ECHO MEAS - PA PR(ACCEL): 23.5 MMHG
BH CV ECHO MEAS - PA V2 MAX: 104.2 CM/SEC
BH CV ECHO MEAS - PA V2 MAX: 114.9 CM/SEC
BH CV ECHO MEAS - SI(CUBED): 20.8 ML/M^2
BH CV ECHO MEAS - SI(CUBED): 4.8 ML/M^2
BH CV ECHO MEAS - SI(MOD-SP2): 19.2 ML/M^2
BH CV ECHO MEAS - SI(MOD-SP4): 28.6 ML/M^2
BH CV ECHO MEAS - SI(TEICH): 19 ML/M^2
BH CV ECHO MEAS - SI(TEICH): 4.6 ML/M^2
BH CV ECHO MEAS - SV(CUBED): 11.8 ML
BH CV ECHO MEAS - SV(CUBED): 51 ML
BH CV ECHO MEAS - SV(MOD-SP2): 47 ML
BH CV ECHO MEAS - SV(MOD-SP4): 70 ML
BH CV ECHO MEAS - SV(TEICH): 11.4 ML
BH CV ECHO MEAS - SV(TEICH): 46.5 ML
BH CV ECHO MEAS - TAPSE (>1.6): 1.9 CM2
BH CV ECHO MEASUREMENTS AVERAGE E/E' RATIO: 6.46
BH CV VAS BP LEFT ARM: NORMAL MMHG
BH CV XLRA - RV BASE: 2 CM
BH CV XLRA - RV LENGTH: 5.2 CM
BH CV XLRA - RV MID: 1.6 CM
BH CV XLRA - TDI S': 11.4 CM/SEC
BH CV XLRA MEAS LEFT CCA RATIO VEL: 126 CM/SEC
BH CV XLRA MEAS LEFT DIST CCA EDV: 26.7 CM/SEC
BH CV XLRA MEAS LEFT DIST CCA PSV: 106.9 CM/SEC
BH CV XLRA MEAS LEFT DIST ICA EDV: 33 CM/SEC
BH CV XLRA MEAS LEFT DIST ICA PSV: 86.4 CM/SEC
BH CV XLRA MEAS LEFT ICA RATIO VEL: 112 CM/SEC
BH CV XLRA MEAS LEFT ICA/CCA RATIO: 0.89
BH CV XLRA MEAS LEFT MID CCA EDV: 22.6 CM/SEC
BH CV XLRA MEAS LEFT MID CCA PSV: 126.7 CM/SEC
BH CV XLRA MEAS LEFT MID ICA EDV: 33 CM/SEC
BH CV XLRA MEAS LEFT MID ICA PSV: 113.1 CM/SEC
BH CV XLRA MEAS LEFT PROX CCA EDV: 19.6 CM/SEC
BH CV XLRA MEAS LEFT PROX CCA PSV: 159.1 CM/SEC
BH CV XLRA MEAS LEFT PROX ECA PSV: 118.6 CM/SEC
BH CV XLRA MEAS LEFT PROX ICA EDV: 27.5 CM/SEC
BH CV XLRA MEAS LEFT PROX ICA PSV: 102.9 CM/SEC
BH CV XLRA MEAS LEFT PROX SCLA PSV: 211.9 CM/SEC
BH CV XLRA MEAS LEFT VERTEBRAL A EDV: 16.2 CM/SEC
BH CV XLRA MEAS LEFT VERTEBRAL A PSV: 51.1 CM/SEC
BH CV XLRA MEAS RIGHT CCA RATIO VEL: 135 CM/SEC
BH CV XLRA MEAS RIGHT DIST CCA EDV: 15.7 CM/SEC
BH CV XLRA MEAS RIGHT DIST CCA PSV: 122.6 CM/SEC
BH CV XLRA MEAS RIGHT DIST ICA EDV: 30.2 CM/SEC
BH CV XLRA MEAS RIGHT DIST ICA PSV: 86.1 CM/SEC
BH CV XLRA MEAS RIGHT ICA RATIO VEL: 128 CM/SEC
BH CV XLRA MEAS RIGHT ICA/CCA RATIO: 0.95
BH CV XLRA MEAS RIGHT MID CCA EDV: 15.7 CM/SEC
BH CV XLRA MEAS RIGHT MID CCA PSV: 135.9 CM/SEC
BH CV XLRA MEAS RIGHT MID ICA EDV: 29.9 CM/SEC
BH CV XLRA MEAS RIGHT MID ICA PSV: 128.9 CM/SEC
BH CV XLRA MEAS RIGHT PROX CCA EDV: 30.4 CM/SEC
BH CV XLRA MEAS RIGHT PROX CCA PSV: 157.1 CM/SEC
BH CV XLRA MEAS RIGHT PROX ECA PSV: 146.1 CM/SEC
BH CV XLRA MEAS RIGHT PROX ICA EDV: 20.4 CM/SEC
BH CV XLRA MEAS RIGHT PROX ICA PSV: 116.3 CM/SEC
BH CV XLRA MEAS RIGHT PROX SCLA PSV: 198.6 CM/SEC
BH CV XLRA MEAS RIGHT VERTEBRAL A EDV: 15.7 CM/SEC
BH CV XLRA MEAS RIGHT VERTEBRAL A PSV: 63.8 CM/SEC
BILIRUB SERPL-MCNC: 0.3 MG/DL (ref 0.2–1.8)
BILIRUB SERPL-MCNC: 0.4 MG/DL (ref 0.2–1.8)
BILIRUB SERPL-MCNC: 0.4 MG/DL (ref 0.3–1.2)
BILIRUB SERPL-MCNC: 0.4 MG/DL (ref 0.3–1.2)
BILIRUB SERPL-MCNC: 0.5 MG/DL (ref 0.2–1.8)
BILIRUB SERPL-MCNC: 0.5 MG/DL (ref 0.2–1.8)
BILIRUB SERPL-MCNC: 0.9 MG/DL (ref 0.2–1.8)
BILIRUB UR QL STRIP: NEGATIVE
BILIRUB UR QL STRIP: NEGATIVE
BLD GP AB SCN SERPL QL: NEGATIVE
BNP SERPL-MCNC: 15 PG/ML (ref 0–100)
BNP SERPL-MCNC: 209 PG/ML (ref 0–100)
BNP SERPL-MCNC: 42 PG/ML (ref 0–100)
BNP SERPL-MCNC: 70 PG/ML (ref 0–100)
BUN BLD-MCNC: 10 MG/DL (ref 9–23)
BUN BLD-MCNC: 12 MG/DL (ref 7–21)
BUN BLD-MCNC: 12 MG/DL (ref 7–21)
BUN BLD-MCNC: 12 MG/DL (ref 9–23)
BUN BLD-MCNC: 13 MG/DL (ref 9–23)
BUN BLD-MCNC: 13 MG/DL (ref 9–23)
BUN BLD-MCNC: 14 MG/DL (ref 7–21)
BUN BLD-MCNC: 14 MG/DL (ref 9–23)
BUN BLD-MCNC: 15 MG/DL (ref 7–21)
BUN BLD-MCNC: 19 MG/DL (ref 9–23)
BUN BLD-MCNC: 20 MG/DL (ref 9–23)
BUN BLD-MCNC: 22 MG/DL (ref 9–23)
BUN BLD-MCNC: 22 MG/DL (ref 9–23)
BUN BLD-MCNC: 24 MG/DL (ref 9–23)
BUN BLD-MCNC: 27 MG/DL (ref 9–23)
BUN BLD-MCNC: 28 MG/DL (ref 9–23)
BUN BLD-MCNC: 29 MG/DL (ref 9–23)
BUN BLD-MCNC: 30 MG/DL (ref 9–23)
BUN BLD-MCNC: 31 MG/DL (ref 9–23)
BUN BLD-MCNC: 9 MG/DL (ref 7–21)
BUN/CREAT SERPL: 10 (ref 7–25)
BUN/CREAT SERPL: 10.8 (ref 7–25)
BUN/CREAT SERPL: 12.9 (ref 7–25)
BUN/CREAT SERPL: 13 (ref 7–25)
BUN/CREAT SERPL: 13.6 (ref 7–25)
BUN/CREAT SERPL: 13.6 (ref 7–25)
BUN/CREAT SERPL: 14.2 (ref 7–25)
BUN/CREAT SERPL: 14.3 (ref 7–25)
BUN/CREAT SERPL: 15.3 (ref 7–25)
BUN/CREAT SERPL: 15.7 (ref 7–25)
BUN/CREAT SERPL: 16.7 (ref 7–25)
BUN/CREAT SERPL: 18.4 (ref 7–25)
BUN/CREAT SERPL: 18.9 (ref 7–25)
BUN/CREAT SERPL: 19.6 (ref 7–25)
BUN/CREAT SERPL: 19.9 (ref 7–25)
BUN/CREAT SERPL: 6.7 (ref 7–25)
BUN/CREAT SERPL: 6.9 (ref 7–25)
BUN/CREAT SERPL: 7.9 (ref 7–25)
BUPRENORPHINE SERPL-MCNC: NEGATIVE NG/ML
BUPRENORPHINE SERPL-MCNC: NEGATIVE NG/ML
CA-I BLDA-SCNC: 0.84 MMOL/L (ref 1.2–1.32)
CA-I BLDA-SCNC: 0.93 MMOL/L (ref 1.2–1.32)
CA-I BLDA-SCNC: 1.13 MMOL/L (ref 1.2–1.32)
CA-I BLDA-SCNC: 1.18 MMOL/L (ref 1.2–1.32)
CA-I BLDA-SCNC: 1.23 MMOL/L (ref 1.2–1.32)
CA-I SERPL ISE-MCNC: 1.19 MMOL/L (ref 1.12–1.32)
CA-I SERPL ISE-MCNC: 1.24 MMOL/L (ref 1.12–1.32)
CALCIUM SPEC-SCNC: 8 MG/DL (ref 8.7–10.4)
CALCIUM SPEC-SCNC: 8.1 MG/DL (ref 8.7–10.4)
CALCIUM SPEC-SCNC: 8.1 MG/DL (ref 8.7–10.4)
CALCIUM SPEC-SCNC: 8.2 MG/DL (ref 8.7–10.4)
CALCIUM SPEC-SCNC: 8.3 MG/DL (ref 7.7–10)
CALCIUM SPEC-SCNC: 8.3 MG/DL (ref 8.7–10.4)
CALCIUM SPEC-SCNC: 8.4 MG/DL (ref 8.7–10.4)
CALCIUM SPEC-SCNC: 8.4 MG/DL (ref 8.7–10.4)
CALCIUM SPEC-SCNC: 8.5 MG/DL (ref 8.7–10.4)
CALCIUM SPEC-SCNC: 8.5 MG/DL (ref 8.7–10.4)
CALCIUM SPEC-SCNC: 8.6 MG/DL (ref 8.7–10.4)
CALCIUM SPEC-SCNC: 8.6 MG/DL (ref 8.7–10.4)
CALCIUM SPEC-SCNC: 8.7 MG/DL (ref 7.7–10)
CALCIUM SPEC-SCNC: 8.7 MG/DL (ref 8.7–10.4)
CALCIUM SPEC-SCNC: 8.8 MG/DL (ref 7.7–10)
CALCIUM SPEC-SCNC: 9.1 MG/DL (ref 7.7–10)
CALCIUM SPEC-SCNC: 9.1 MG/DL (ref 8.7–10.4)
CALCIUM SPEC-SCNC: 9.2 MG/DL (ref 7.7–10)
CANNABINOIDS SERPL QL: NEGATIVE
CANNABINOIDS SERPL QL: NEGATIVE
CHLORIDE SERPL-SCNC: 101 MMOL/L (ref 99–109)
CHLORIDE SERPL-SCNC: 103 MMOL/L (ref 99–109)
CHLORIDE SERPL-SCNC: 103 MMOL/L (ref 99–112)
CHLORIDE SERPL-SCNC: 104 MMOL/L (ref 99–109)
CHLORIDE SERPL-SCNC: 104 MMOL/L (ref 99–112)
CHLORIDE SERPL-SCNC: 104 MMOL/L (ref 99–112)
CHLORIDE SERPL-SCNC: 105 MMOL/L (ref 99–109)
CHLORIDE SERPL-SCNC: 105 MMOL/L (ref 99–112)
CHLORIDE SERPL-SCNC: 106 MMOL/L (ref 99–109)
CHLORIDE SERPL-SCNC: 106 MMOL/L (ref 99–109)
CHLORIDE SERPL-SCNC: 106 MMOL/L (ref 99–112)
CHLORIDE SERPL-SCNC: 109 MMOL/L (ref 99–109)
CHLORIDE SERPL-SCNC: 109 MMOL/L (ref 99–109)
CHLORIDE SERPL-SCNC: 94 MMOL/L (ref 99–109)
CHOLEST SERPL-MCNC: 163 MG/DL (ref 0–200)
CHOLEST SERPL-MCNC: 189 MG/DL (ref 0–200)
CHOLEST SERPL-MCNC: 191 MG/DL (ref 0–200)
CHOLEST SERPL-MCNC: 223 MG/DL (ref 0–200)
CK MB SERPL-CCNC: 1.88 NG/ML (ref 0–5)
CK MB SERPL-CCNC: 11.03 NG/ML (ref 0–5)
CK MB SERPL-CCNC: 8.49 NG/ML (ref 0–5)
CK MB SERPL-RTO: 1.5 % (ref 0–3)
CK MB SERPL-RTO: 6.4 % (ref 0–3)
CK MB SERPL-RTO: 8.4 % (ref 0–3)
CK SERPL-CCNC: 129 U/L (ref 24–204)
CK SERPL-CCNC: 132 U/L (ref 24–204)
CK SERPL-CCNC: 132 U/L (ref 24–204)
CLARITY UR: CLEAR
CLARITY UR: CLEAR
CO2 BLDA-SCNC: 23 MMOL/L (ref 24–29)
CO2 BLDA-SCNC: 23 MMOL/L (ref 24–29)
CO2 BLDA-SCNC: 23.2 MMOL/L (ref 22–33)
CO2 BLDA-SCNC: 24 MMOL/L (ref 24–29)
CO2 BLDA-SCNC: 24.1 MMOL/L (ref 22–33)
CO2 BLDA-SCNC: 24.3 MMOL/L (ref 22–33)
CO2 BLDA-SCNC: 25 MMOL/L (ref 24–29)
CO2 BLDA-SCNC: 25.1 MMOL/L (ref 22–33)
CO2 BLDA-SCNC: 25.1 MMOL/L (ref 22–33)
CO2 BLDA-SCNC: 25.3 MMOL/L (ref 22–33)
CO2 BLDA-SCNC: 28 MMOL/L (ref 24–29)
CO2 SERPL-SCNC: 20 MMOL/L (ref 20–31)
CO2 SERPL-SCNC: 20.6 MMOL/L (ref 24.3–31.9)
CO2 SERPL-SCNC: 22 MMOL/L (ref 20–31)
CO2 SERPL-SCNC: 22 MMOL/L (ref 20–31)
CO2 SERPL-SCNC: 22.9 MMOL/L (ref 24.3–31.9)
CO2 SERPL-SCNC: 23 MMOL/L (ref 20–31)
CO2 SERPL-SCNC: 23.8 MMOL/L (ref 24.3–31.9)
CO2 SERPL-SCNC: 24 MMOL/L (ref 20–31)
CO2 SERPL-SCNC: 24 MMOL/L (ref 20–31)
CO2 SERPL-SCNC: 25 MMOL/L (ref 20–31)
CO2 SERPL-SCNC: 25.7 MMOL/L (ref 24.3–31.9)
CO2 SERPL-SCNC: 26 MMOL/L (ref 20–31)
CO2 SERPL-SCNC: 26.4 MMOL/L (ref 24.3–31.9)
CO2 SERPL-SCNC: 27 MMOL/L (ref 20–31)
CO2 SERPL-SCNC: 27 MMOL/L (ref 20–31)
COCAINE UR QL: NEGATIVE
COCAINE UR QL: NEGATIVE
COHGB MFR BLD: 0.7 % (ref 0–2)
COHGB MFR BLD: 0.8 % (ref 0–2)
COHGB MFR BLD: 0.9 % (ref 0–2)
COLOR UR: ABNORMAL
COLOR UR: YELLOW
CREAT BLD-MCNC: 0.98 MG/DL (ref 0.43–1.29)
CREAT BLD-MCNC: 1 MG/DL (ref 0.6–1.3)
CREAT BLD-MCNC: 1.03 MG/DL (ref 0.43–1.29)
CREAT BLD-MCNC: 1.11 MG/DL (ref 0.43–1.29)
CREAT BLD-MCNC: 1.2 MG/DL (ref 0.6–1.3)
CREAT BLD-MCNC: 1.2 MG/DL (ref 0.6–1.3)
CREAT BLD-MCNC: 1.3 MG/DL (ref 0.6–1.3)
CREAT BLD-MCNC: 1.31 MG/DL (ref 0.43–1.29)
CREAT BLD-MCNC: 1.4 MG/DL (ref 0.6–1.3)
CREAT BLD-MCNC: 1.46 MG/DL (ref 0.6–1.3)
CREAT BLD-MCNC: 1.48 MG/DL (ref 0.6–1.3)
CREAT BLD-MCNC: 1.5 MG/DL (ref 0.6–1.3)
CREAT BLD-MCNC: 1.52 MG/DL (ref 0.43–1.29)
CREAT BLD-MCNC: 1.58 MG/DL (ref 0.6–1.3)
CREAT BLD-MCNC: 1.62 MG/DL (ref 0.6–1.3)
CREAT BLD-MCNC: 1.63 MG/DL (ref 0.6–1.3)
CREAT BLD-MCNC: 1.69 MG/DL (ref 0.6–1.3)
CREAT BLD-MCNC: 1.7 MG/DL (ref 0.6–1.3)
D-LACTATE SERPL-SCNC: 2.8 MMOL/L (ref 0.5–2)
DEPRECATED RDW RBC AUTO: 42.1 FL (ref 37–54)
DEPRECATED RDW RBC AUTO: 42.2 FL (ref 37–54)
DEPRECATED RDW RBC AUTO: 42.2 FL (ref 37–54)
DEPRECATED RDW RBC AUTO: 42.3 FL (ref 37–54)
DEPRECATED RDW RBC AUTO: 42.8 FL (ref 37–54)
DEPRECATED RDW RBC AUTO: 43.1 FL (ref 37–54)
DEPRECATED RDW RBC AUTO: 44.3 FL (ref 37–54)
DEPRECATED RDW RBC AUTO: 44.4 FL (ref 37–54)
DEPRECATED RDW RBC AUTO: 45.1 FL (ref 37–54)
DEPRECATED RDW RBC AUTO: 45.7 FL (ref 37–54)
DEPRECATED RDW RBC AUTO: 49.5 FL (ref 37–54)
DEPRECATED RDW RBC AUTO: 49.5 FL (ref 37–54)
DEPRECATED RDW RBC AUTO: 50 FL (ref 37–54)
DEPRECATED RDW RBC AUTO: 50.6 FL (ref 37–54)
DEPRECATED RDW RBC AUTO: 51.6 FL (ref 37–54)
DEPRECATED RDW RBC AUTO: 52.2 FL (ref 37–54)
DEPRECATED RDW RBC AUTO: 53.2 FL (ref 37–54)
DEPRECATED RDW RBC AUTO: 54.9 FL (ref 37–54)
E/E' RATIO: 6.8
EOSINOPHIL # BLD AUTO: 0.17 10*3/MM3 (ref 0–0.7)
EOSINOPHIL # BLD AUTO: 0.19 10*3/MM3 (ref 0–0.3)
EOSINOPHIL # BLD AUTO: 0.19 10*3/MM3 (ref 0–0.7)
EOSINOPHIL # BLD AUTO: 0.22 10*3/MM3 (ref 0–0.7)
EOSINOPHIL # BLD AUTO: 0.26 10*3/MM3 (ref 0–0.7)
EOSINOPHIL # BLD AUTO: 0.27 10*3/MM3 (ref 0–0.3)
EOSINOPHIL # BLD AUTO: 0.29 10*3/MM3 (ref 0–0.7)
EOSINOPHIL # BLD AUTO: 0.35 10*3/MM3 (ref 0–0.3)
EOSINOPHIL # BLD AUTO: 0.36 10*3/MM3 (ref 0–0.3)
EOSINOPHIL # BLD AUTO: 0.37 10*3/MM3 (ref 0–0.7)
EOSINOPHIL # BLD AUTO: 0.38 10*3/MM3 (ref 0–0.3)
EOSINOPHIL NFR BLD AUTO: 1.1 % (ref 0–5)
EOSINOPHIL NFR BLD AUTO: 2 % (ref 0–3)
EOSINOPHIL NFR BLD AUTO: 2.1 % (ref 0–5)
EOSINOPHIL NFR BLD AUTO: 2.6 % (ref 0–5)
EOSINOPHIL NFR BLD AUTO: 2.8 % (ref 0–3)
EOSINOPHIL NFR BLD AUTO: 3.1 % (ref 0–5)
EOSINOPHIL NFR BLD AUTO: 3.1 % (ref 0–5)
EOSINOPHIL NFR BLD AUTO: 4.1 % (ref 0–3)
EOSINOPHIL NFR BLD AUTO: 4.4 % (ref 0–5)
EOSINOPHIL NFR BLD AUTO: 4.5 % (ref 0–3)
EOSINOPHIL NFR BLD AUTO: 4.6 % (ref 0–3)
ERYTHROCYTE [DISTWIDTH] IN BLOOD BY AUTOMATED COUNT: 12.7 % (ref 11.5–14.5)
ERYTHROCYTE [DISTWIDTH] IN BLOOD BY AUTOMATED COUNT: 12.8 % (ref 11.3–14.5)
ERYTHROCYTE [DISTWIDTH] IN BLOOD BY AUTOMATED COUNT: 12.8 % (ref 11.5–14.5)
ERYTHROCYTE [DISTWIDTH] IN BLOOD BY AUTOMATED COUNT: 12.8 % (ref 11.5–14.5)
ERYTHROCYTE [DISTWIDTH] IN BLOOD BY AUTOMATED COUNT: 12.9 % (ref 11.5–14.5)
ERYTHROCYTE [DISTWIDTH] IN BLOOD BY AUTOMATED COUNT: 13.1 % (ref 11.3–14.5)
ERYTHROCYTE [DISTWIDTH] IN BLOOD BY AUTOMATED COUNT: 13.1 % (ref 11.5–14.5)
ERYTHROCYTE [DISTWIDTH] IN BLOOD BY AUTOMATED COUNT: 13.2 % (ref 11.3–14.5)
ERYTHROCYTE [DISTWIDTH] IN BLOOD BY AUTOMATED COUNT: 13.2 % (ref 11.5–14.5)
ERYTHROCYTE [DISTWIDTH] IN BLOOD BY AUTOMATED COUNT: 13.4 % (ref 11.3–14.5)
ERYTHROCYTE [DISTWIDTH] IN BLOOD BY AUTOMATED COUNT: 13.8 % (ref 11.3–14.5)
ERYTHROCYTE [DISTWIDTH] IN BLOOD BY AUTOMATED COUNT: 14 % (ref 11.3–14.5)
ERYTHROCYTE [DISTWIDTH] IN BLOOD BY AUTOMATED COUNT: 14.1 % (ref 11.3–14.5)
ERYTHROCYTE [DISTWIDTH] IN BLOOD BY AUTOMATED COUNT: 14.2 % (ref 11.3–14.5)
ERYTHROCYTE [DISTWIDTH] IN BLOOD BY AUTOMATED COUNT: 14.3 % (ref 11.3–14.5)
ERYTHROCYTE [DISTWIDTH] IN BLOOD BY AUTOMATED COUNT: 14.4 % (ref 11.3–14.5)
ERYTHROCYTE [DISTWIDTH] IN BLOOD BY AUTOMATED COUNT: 14.9 % (ref 11.3–14.5)
ERYTHROCYTE [DISTWIDTH] IN BLOOD BY AUTOMATED COUNT: 14.9 % (ref 11.3–14.5)
ERYTHROCYTE [SEDIMENTATION RATE] IN BLOOD: 43 MM/HR (ref 0–15)
GFR SERPL CREATININE-BSD FRML MDRD: 45 ML/MIN/1.73
GFR SERPL CREATININE-BSD FRML MDRD: 45 ML/MIN/1.73
GFR SERPL CREATININE-BSD FRML MDRD: 47 ML/MIN/1.73
GFR SERPL CREATININE-BSD FRML MDRD: 47 ML/MIN/1.73
GFR SERPL CREATININE-BSD FRML MDRD: 49 ML/MIN/1.73
GFR SERPL CREATININE-BSD FRML MDRD: 51 ML/MIN/1.73
GFR SERPL CREATININE-BSD FRML MDRD: 52 ML/MIN/1.73
GFR SERPL CREATININE-BSD FRML MDRD: 53 ML/MIN/1.73
GFR SERPL CREATININE-BSD FRML MDRD: 53 ML/MIN/1.73
GFR SERPL CREATININE-BSD FRML MDRD: 56 ML/MIN/1.73
GFR SERPL CREATININE-BSD FRML MDRD: 61 ML/MIN/1.73
GFR SERPL CREATININE-BSD FRML MDRD: 61 ML/MIN/1.73
GFR SERPL CREATININE-BSD FRML MDRD: 67 ML/MIN/1.73
GFR SERPL CREATININE-BSD FRML MDRD: 67 ML/MIN/1.73
GFR SERPL CREATININE-BSD FRML MDRD: 74 ML/MIN/1.73
GFR SERPL CREATININE-BSD FRML MDRD: 80 ML/MIN/1.73
GFR SERPL CREATININE-BSD FRML MDRD: 83 ML/MIN/1.73
GFR SERPL CREATININE-BSD FRML MDRD: 85 ML/MIN/1.73
GLOBULIN UR ELPH-MCNC: 2.2 GM/DL
GLOBULIN UR ELPH-MCNC: 2.3 GM/DL
GLOBULIN UR ELPH-MCNC: 2.5 GM/DL
GLOBULIN UR ELPH-MCNC: 2.5 GM/DL
GLOBULIN UR ELPH-MCNC: 2.8 GM/DL
GLOBULIN UR ELPH-MCNC: 3 GM/DL
GLOBULIN UR ELPH-MCNC: 3 GM/DL
GLUCOSE BLD-MCNC: 100 MG/DL (ref 70–110)
GLUCOSE BLD-MCNC: 102 MG/DL (ref 70–100)
GLUCOSE BLD-MCNC: 102 MG/DL (ref 70–110)
GLUCOSE BLD-MCNC: 106 MG/DL (ref 70–100)
GLUCOSE BLD-MCNC: 108 MG/DL (ref 70–100)
GLUCOSE BLD-MCNC: 116 MG/DL (ref 70–100)
GLUCOSE BLD-MCNC: 121 MG/DL (ref 70–100)
GLUCOSE BLD-MCNC: 122 MG/DL (ref 70–110)
GLUCOSE BLD-MCNC: 126 MG/DL (ref 70–100)
GLUCOSE BLD-MCNC: 133 MG/DL (ref 70–100)
GLUCOSE BLD-MCNC: 136 MG/DL (ref 70–110)
GLUCOSE BLD-MCNC: 149 MG/DL (ref 70–100)
GLUCOSE BLD-MCNC: 152 MG/DL (ref 70–110)
GLUCOSE BLD-MCNC: 81 MG/DL (ref 70–100)
GLUCOSE BLD-MCNC: 84 MG/DL (ref 70–100)
GLUCOSE BLD-MCNC: 87 MG/DL (ref 70–100)
GLUCOSE BLD-MCNC: 92 MG/DL (ref 70–100)
GLUCOSE BLD-MCNC: 98 MG/DL (ref 70–100)
GLUCOSE BLDC GLUCOMTR-MCNC: 106 MG/DL (ref 70–130)
GLUCOSE BLDC GLUCOMTR-MCNC: 108 MG/DL (ref 70–130)
GLUCOSE BLDC GLUCOMTR-MCNC: 110 MG/DL (ref 70–130)
GLUCOSE BLDC GLUCOMTR-MCNC: 110 MG/DL (ref 70–130)
GLUCOSE BLDC GLUCOMTR-MCNC: 113 MG/DL (ref 70–130)
GLUCOSE BLDC GLUCOMTR-MCNC: 114 MG/DL (ref 70–130)
GLUCOSE BLDC GLUCOMTR-MCNC: 119 MG/DL (ref 70–130)
GLUCOSE BLDC GLUCOMTR-MCNC: 122 MG/DL (ref 70–130)
GLUCOSE BLDC GLUCOMTR-MCNC: 133 MG/DL (ref 70–130)
GLUCOSE BLDC GLUCOMTR-MCNC: 135 MG/DL (ref 70–130)
GLUCOSE BLDC GLUCOMTR-MCNC: 135 MG/DL (ref 70–130)
GLUCOSE BLDC GLUCOMTR-MCNC: 136 MG/DL (ref 70–130)
GLUCOSE BLDC GLUCOMTR-MCNC: 137 MG/DL (ref 70–130)
GLUCOSE BLDC GLUCOMTR-MCNC: 138 MG/DL (ref 70–130)
GLUCOSE BLDC GLUCOMTR-MCNC: 138 MG/DL (ref 70–130)
GLUCOSE BLDC GLUCOMTR-MCNC: 140 MG/DL (ref 70–130)
GLUCOSE BLDC GLUCOMTR-MCNC: 142 MG/DL (ref 70–130)
GLUCOSE BLDC GLUCOMTR-MCNC: 143 MG/DL (ref 70–130)
GLUCOSE BLDC GLUCOMTR-MCNC: 143 MG/DL (ref 70–130)
GLUCOSE BLDC GLUCOMTR-MCNC: 146 MG/DL (ref 70–130)
GLUCOSE BLDC GLUCOMTR-MCNC: 146 MG/DL (ref 70–130)
GLUCOSE BLDC GLUCOMTR-MCNC: 148 MG/DL (ref 70–130)
GLUCOSE BLDC GLUCOMTR-MCNC: 148 MG/DL (ref 70–130)
GLUCOSE BLDC GLUCOMTR-MCNC: 153 MG/DL (ref 70–130)
GLUCOSE BLDC GLUCOMTR-MCNC: 173 MG/DL (ref 70–130)
GLUCOSE BLDC GLUCOMTR-MCNC: 62 MG/DL (ref 70–130)
GLUCOSE BLDC GLUCOMTR-MCNC: 64 MG/DL (ref 70–130)
GLUCOSE BLDC GLUCOMTR-MCNC: 65 MG/DL (ref 70–130)
GLUCOSE BLDC GLUCOMTR-MCNC: 67 MG/DL (ref 70–130)
GLUCOSE BLDC GLUCOMTR-MCNC: 83 MG/DL (ref 70–130)
GLUCOSE BLDC GLUCOMTR-MCNC: 84 MG/DL (ref 70–130)
GLUCOSE BLDC GLUCOMTR-MCNC: 90 MG/DL (ref 70–130)
GLUCOSE UR STRIP-MCNC: NEGATIVE MG/DL
GLUCOSE UR STRIP-MCNC: NEGATIVE MG/DL
HBA1C MFR BLD: 5.7 % (ref 4.8–5.6)
HBA1C MFR BLD: 5.8 % (ref 4.8–5.6)
HBA1C MFR BLD: 6.1 % (ref 4.5–5.7)
HCO3 BLDA-SCNC: 21.6 MMOL/L (ref 22–26)
HCO3 BLDA-SCNC: 21.7 MMOL/L (ref 20–26)
HCO3 BLDA-SCNC: 22 MMOL/L (ref 22–26)
HCO3 BLDA-SCNC: 22.5 MMOL/L (ref 20–26)
HCO3 BLDA-SCNC: 22.8 MMOL/L (ref 20–26)
HCO3 BLDA-SCNC: 23.3 MMOL/L (ref 22–26)
HCO3 BLDA-SCNC: 23.7 MMOL/L (ref 20–26)
HCO3 BLDA-SCNC: 23.7 MMOL/L (ref 22–26)
HCO3 BLDA-SCNC: 23.8 MMOL/L (ref 20–26)
HCO3 BLDA-SCNC: 24 MMOL/L (ref 20–26)
HCO3 BLDA-SCNC: 26.2 MMOL/L (ref 22–26)
HCT VFR BLD AUTO: 23.8 % (ref 38.9–50.9)
HCT VFR BLD AUTO: 23.9 % (ref 38.9–50.9)
HCT VFR BLD AUTO: 24 % (ref 38.9–50.9)
HCT VFR BLD AUTO: 24.6 % (ref 38.9–50.9)
HCT VFR BLD AUTO: 26.1 % (ref 38.9–50.9)
HCT VFR BLD AUTO: 27.6 % (ref 38.9–50.9)
HCT VFR BLD AUTO: 27.8 % (ref 38.9–50.9)
HCT VFR BLD AUTO: 28.5 % (ref 38.9–50.9)
HCT VFR BLD AUTO: 37.1 % (ref 38.9–50.9)
HCT VFR BLD AUTO: 38.3 % (ref 38.9–50.9)
HCT VFR BLD AUTO: 38.8 % (ref 38.9–50.9)
HCT VFR BLD AUTO: 40.6 % (ref 42–52)
HCT VFR BLD AUTO: 41.6 % (ref 42–52)
HCT VFR BLD AUTO: 41.7 % (ref 38.9–50.9)
HCT VFR BLD AUTO: 43.1 % (ref 38.9–50.9)
HCT VFR BLD AUTO: 43.4 % (ref 42–52)
HCT VFR BLD AUTO: 45.6 % (ref 42–52)
HCT VFR BLD AUTO: 46 % (ref 42–52)
HCT VFR BLD AUTO: 46.4 % (ref 38.9–50.9)
HCT VFR BLD AUTO: 53 % (ref 42–52)
HCT VFR BLD CALC: 27 %
HCT VFR BLD CALC: 27.1 %
HCT VFR BLD CALC: 27.2 %
HCT VFR BLD CALC: 27.3 %
HCT VFR BLD CALC: 27.9 %
HCT VFR BLD CALC: 30.8 %
HCT VFR BLDA CALC: 20 % (ref 38–51)
HCT VFR BLDA CALC: 24 % (ref 38–51)
HCT VFR BLDA CALC: 26 % (ref 38–51)
HCT VFR BLDA CALC: 30 % (ref 38–51)
HCT VFR BLDA CALC: 33 % (ref 38–51)
HDLC SERPL-MCNC: 31 MG/DL (ref 60–100)
HDLC SERPL-MCNC: 32 MG/DL (ref 40–60)
HDLC SERPL-MCNC: 34 MG/DL (ref 60–100)
HDLC SERPL-MCNC: 37 MG/DL (ref 60–100)
HGB BLD-MCNC: 11.8 G/DL (ref 13.1–17.5)
HGB BLD-MCNC: 12.3 G/DL (ref 13.1–17.5)
HGB BLD-MCNC: 13 G/DL (ref 13.1–17.5)
HGB BLD-MCNC: 13.7 G/DL (ref 13.1–17.5)
HGB BLD-MCNC: 13.7 G/DL (ref 14–18)
HGB BLD-MCNC: 13.9 G/DL (ref 14–18)
HGB BLD-MCNC: 14.3 G/DL (ref 13.1–17.5)
HGB BLD-MCNC: 14.6 G/DL (ref 14–18)
HGB BLD-MCNC: 15.2 G/DL (ref 14–18)
HGB BLD-MCNC: 15.5 G/DL (ref 13.1–17.5)
HGB BLD-MCNC: 15.5 G/DL (ref 14–18)
HGB BLD-MCNC: 18 G/DL (ref 14–18)
HGB BLD-MCNC: 7.3 G/DL (ref 13.1–17.5)
HGB BLD-MCNC: 7.4 G/DL (ref 13.1–17.5)
HGB BLD-MCNC: 7.5 G/DL (ref 13.1–17.5)
HGB BLD-MCNC: 7.6 G/DL (ref 13.1–17.5)
HGB BLD-MCNC: 8.2 G/DL (ref 13.1–17.5)
HGB BLD-MCNC: 8.7 G/DL (ref 13.1–17.5)
HGB BLD-MCNC: 9.1 G/DL (ref 13.1–17.5)
HGB BLD-MCNC: 9.3 G/DL (ref 13.1–17.5)
HGB BLDA-MCNC: 10 G/DL (ref 13.5–17.5)
HGB BLDA-MCNC: 10.2 G/DL (ref 12–17)
HGB BLDA-MCNC: 11.2 G/DL (ref 12–17)
HGB BLDA-MCNC: 6.8 G/DL (ref 12–17)
HGB BLDA-MCNC: 8.2 G/DL (ref 12–17)
HGB BLDA-MCNC: 8.8 G/DL (ref 12–17)
HGB BLDA-MCNC: 8.8 G/DL (ref 13.5–17.5)
HGB BLDA-MCNC: 8.8 G/DL (ref 13.5–17.5)
HGB BLDA-MCNC: 8.9 G/DL (ref 13.5–17.5)
HGB BLDA-MCNC: 8.9 G/DL (ref 13.5–17.5)
HGB BLDA-MCNC: 9.1 G/DL (ref 13.5–17.5)
HGB UR QL STRIP.AUTO: NEGATIVE
HGB UR QL STRIP.AUTO: NEGATIVE
HOLD SPECIMEN: NORMAL
HOROWITZ INDEX BLD+IHG-RTO: 100 %
HOROWITZ INDEX BLD+IHG-RTO: 21 %
HOROWITZ INDEX BLD+IHG-RTO: 40 %
IMM GRANULOCYTES # BLD AUTO: 0.03 10*3/MM3 (ref 0–0.03)
IMM GRANULOCYTES # BLD: 0.01 10*3/MM3 (ref 0–0.03)
IMM GRANULOCYTES # BLD: 0.02 10*3/MM3 (ref 0–0.03)
IMM GRANULOCYTES # BLD: 0.03 10*3/MM3 (ref 0–0.03)
IMM GRANULOCYTES # BLD: 0.04 10*3/MM3 (ref 0–0.03)
IMM GRANULOCYTES # BLD: 0.04 10*3/MM3 (ref 0–0.03)
IMM GRANULOCYTES # BLD: 0.05 10*3/MM3 (ref 0–0.03)
IMM GRANULOCYTES NFR BLD AUTO: 0.2 % (ref 0–0.5)
IMM GRANULOCYTES NFR BLD: 0.1 % (ref 0–0.5)
IMM GRANULOCYTES NFR BLD: 0.1 % (ref 0–0.5)
IMM GRANULOCYTES NFR BLD: 0.1 % (ref 0–0.6)
IMM GRANULOCYTES NFR BLD: 0.2 % (ref 0–0.5)
IMM GRANULOCYTES NFR BLD: 0.2 % (ref 0–0.5)
IMM GRANULOCYTES NFR BLD: 0.3 % (ref 0–0.5)
IMM GRANULOCYTES NFR BLD: 0.3 % (ref 0–0.6)
IMM GRANULOCYTES NFR BLD: 0.4 % (ref 0–0.6)
IMM GRANULOCYTES NFR BLD: 0.5 % (ref 0–0.6)
IMM GRANULOCYTES NFR BLD: 0.5 % (ref 0–0.6)
INR PPP: 0.96 (ref 0.91–1.09)
INR PPP: 0.98 (ref 0.91–1.09)
INR PPP: 1 (ref 0.9–1.1)
INR PPP: 1 (ref 0.9–1.1)
INR PPP: 1.01
INR PPP: 1.02 (ref 0.91–1.09)
INR PPP: 1.05 (ref 0.91–1.09)
INR PPP: 1.06 (ref 0.91–1.09)
INR PPP: 1.13 (ref 0.9–1.1)
KETONES UR QL STRIP: ABNORMAL
KETONES UR QL STRIP: NEGATIVE
LDLC SERPL CALC-MCNC: 110 MG/DL (ref 0–100)
LDLC SERPL CALC-MCNC: 110 MG/DL (ref 0–100)
LDLC SERPL CALC-MCNC: 70 MG/DL (ref 0–100)
LDLC/HDLC SERPL: 2.25 {RATIO}
LDLC/HDLC SERPL: 2.97 {RATIO}
LDLC/HDLC SERPL: 3.23 {RATIO}
LEFT ARM BP: NORMAL MMHG
LEFT ATRIUM VOLUME INDEX: 9.8 ML/M2
LEUKOCYTE ESTERASE UR QL STRIP.AUTO: NEGATIVE
LEUKOCYTE ESTERASE UR QL STRIP.AUTO: NEGATIVE
LIPASE SERPL-CCNC: 31 U/L (ref 13–60)
LIPASE SERPL-CCNC: 33 U/L (ref 13–60)
LV EF 2D ECHO EST: 40 %
LV EF 2D ECHO EST: 60 %
LYMPHOCYTES # BLD AUTO: 1.44 10*3/MM3 (ref 0.6–4.8)
LYMPHOCYTES # BLD AUTO: 1.73 10*3/MM3 (ref 0.6–4.8)
LYMPHOCYTES # BLD AUTO: 2.34 10*3/MM3 (ref 1–3)
LYMPHOCYTES # BLD AUTO: 2.74 10*3/MM3 (ref 0.6–4.8)
LYMPHOCYTES # BLD AUTO: 2.79 10*3/MM3 (ref 0.6–4.8)
LYMPHOCYTES # BLD AUTO: 2.94 10*3/MM3 (ref 1–3)
LYMPHOCYTES # BLD AUTO: 3.15 10*3/MM3 (ref 1–3)
LYMPHOCYTES # BLD AUTO: 3.31 10*3/MM3 (ref 1–3)
LYMPHOCYTES # BLD AUTO: 3.59 10*3/MM3 (ref 0.6–4.8)
LYMPHOCYTES # BLD AUTO: 3.91 10*3/MM3 (ref 1–3)
LYMPHOCYTES # BLD AUTO: 4.72 10*3/MM3 (ref 1–3)
LYMPHOCYTES NFR BLD AUTO: 11.2 % (ref 24–44)
LYMPHOCYTES NFR BLD AUTO: 20.4 % (ref 24–44)
LYMPHOCYTES NFR BLD AUTO: 26.1 % (ref 21–51)
LYMPHOCYTES NFR BLD AUTO: 31.8 % (ref 21–51)
LYMPHOCYTES NFR BLD AUTO: 32.8 % (ref 21–51)
LYMPHOCYTES NFR BLD AUTO: 35.8 % (ref 24–44)
LYMPHOCYTES NFR BLD AUTO: 37.2 % (ref 24–44)
LYMPHOCYTES NFR BLD AUTO: 37.6 % (ref 21–51)
LYMPHOCYTES NFR BLD AUTO: 39.5 % (ref 21–51)
LYMPHOCYTES NFR BLD AUTO: 42.5 % (ref 24–44)
LYMPHOCYTES NFR BLD AUTO: 43.3 % (ref 21–51)
MAGNESIUM SERPL-MCNC: 1.9 MG/DL (ref 1.7–2.6)
MAGNESIUM SERPL-MCNC: 2 MG/DL (ref 1.3–2.7)
MAGNESIUM SERPL-MCNC: 2 MG/DL (ref 1.3–2.7)
MAGNESIUM SERPL-MCNC: 2.4 MG/DL (ref 1.3–2.7)
MAGNESIUM SERPL-MCNC: 2.7 MG/DL (ref 1.3–2.7)
MAGNESIUM SERPL-MCNC: 2.7 MG/DL (ref 1.3–2.7)
MAXIMAL PREDICTED HEART RATE: 181 BPM
MAXIMAL PREDICTED HEART RATE: 181 BPM
MCH RBC QN AUTO: 30.6 PG (ref 27–31)
MCH RBC QN AUTO: 30.8 PG (ref 27–31)
MCH RBC QN AUTO: 30.8 PG (ref 27–31)
MCH RBC QN AUTO: 30.8 PG (ref 27–33)
MCH RBC QN AUTO: 31 PG (ref 27–31)
MCH RBC QN AUTO: 31 PG (ref 27–31)
MCH RBC QN AUTO: 31 PG (ref 27–33)
MCH RBC QN AUTO: 31.1 PG (ref 27–31)
MCH RBC QN AUTO: 31.2 PG (ref 27–31)
MCH RBC QN AUTO: 31.3 PG (ref 27–31)
MCH RBC QN AUTO: 31.3 PG (ref 27–33)
MCH RBC QN AUTO: 31.4 PG (ref 27–33)
MCH RBC QN AUTO: 31.5 PG (ref 27–33)
MCH RBC QN AUTO: 31.6 PG (ref 27–31)
MCH RBC QN AUTO: 31.6 PG (ref 27–31)
MCH RBC QN AUTO: 31.7 PG (ref 27–31)
MCH RBC QN AUTO: 31.7 PG (ref 27–31)
MCH RBC QN AUTO: 31.9 PG (ref 27–33)
MCHC RBC AUTO-ENTMCNC: 31.1 G/DL (ref 32–36)
MCHC RBC AUTO-ENTMCNC: 31.3 G/DL (ref 32–36)
MCHC RBC AUTO-ENTMCNC: 31.4 G/DL (ref 32–36)
MCHC RBC AUTO-ENTMCNC: 31.5 G/DL (ref 32–36)
MCHC RBC AUTO-ENTMCNC: 31.8 G/DL (ref 32–36)
MCHC RBC AUTO-ENTMCNC: 32.1 G/DL (ref 32–36)
MCHC RBC AUTO-ENTMCNC: 32.6 G/DL (ref 32–36)
MCHC RBC AUTO-ENTMCNC: 32.7 G/DL (ref 32–36)
MCHC RBC AUTO-ENTMCNC: 32.9 G/DL (ref 32–36)
MCHC RBC AUTO-ENTMCNC: 33 G/DL (ref 33–37)
MCHC RBC AUTO-ENTMCNC: 33.2 G/DL (ref 32–36)
MCHC RBC AUTO-ENTMCNC: 33.4 G/DL (ref 32–36)
MCHC RBC AUTO-ENTMCNC: 33.4 G/DL (ref 33–37)
MCHC RBC AUTO-ENTMCNC: 33.5 G/DL (ref 32–36)
MCHC RBC AUTO-ENTMCNC: 33.6 G/DL (ref 33–37)
MCHC RBC AUTO-ENTMCNC: 33.7 G/DL (ref 33–37)
MCHC RBC AUTO-ENTMCNC: 34 G/DL (ref 33–37)
MCHC RBC AUTO-ENTMCNC: 34 G/DL (ref 33–37)
MCV RBC AUTO: 100.4 FL (ref 80–99)
MCV RBC AUTO: 91.9 FL (ref 80–99)
MCV RBC AUTO: 92.1 FL (ref 80–94)
MCV RBC AUTO: 92.7 FL (ref 80–99)
MCV RBC AUTO: 92.9 FL (ref 80–94)
MCV RBC AUTO: 92.9 FL (ref 80–94)
MCV RBC AUTO: 93.1 FL (ref 80–99)
MCV RBC AUTO: 93.3 FL (ref 80–94)
MCV RBC AUTO: 93.7 FL (ref 80–94)
MCV RBC AUTO: 93.8 FL (ref 80–94)
MCV RBC AUTO: 94.5 FL (ref 80–99)
MCV RBC AUTO: 96.9 FL (ref 80–99)
MCV RBC AUTO: 97.3 FL (ref 80–99)
MCV RBC AUTO: 97.4 FL (ref 80–99)
MCV RBC AUTO: 98.5 FL (ref 80–99)
MCV RBC AUTO: 98.6 FL (ref 80–99)
MCV RBC AUTO: 99.2 FL (ref 80–99)
MCV RBC AUTO: 99.6 FL (ref 80–99)
METHADONE UR QL SCN: NEGATIVE
METHADONE UR QL SCN: NEGATIVE
METHGB BLD QL: 1.3 % (ref 0–1.5)
METHGB BLD QL: 1.4 % (ref 0–1.5)
METHGB BLD QL: 1.4 % (ref 0–1.5)
METHGB BLD QL: 1.5 % (ref 0–1.5)
MODALITY: ABNORMAL
MONOCYTES # BLD AUTO: 0.4 10*3/MM3 (ref 0.1–0.9)
MONOCYTES # BLD AUTO: 0.52 10*3/MM3 (ref 0–1)
MONOCYTES # BLD AUTO: 0.55 10*3/MM3 (ref 0–1)
MONOCYTES # BLD AUTO: 0.64 10*3/MM3 (ref 0.1–0.9)
MONOCYTES # BLD AUTO: 0.66 10*3/MM3 (ref 0.1–0.9)
MONOCYTES # BLD AUTO: 0.67 10*3/MM3 (ref 0–1)
MONOCYTES # BLD AUTO: 0.71 10*3/MM3 (ref 0–1)
MONOCYTES # BLD AUTO: 0.77 10*3/MM3 (ref 0.1–0.9)
MONOCYTES # BLD AUTO: 0.82 10*3/MM3 (ref 0.1–0.9)
MONOCYTES # BLD AUTO: 1.31 10*3/MM3 (ref 0.1–0.9)
MONOCYTES # BLD AUTO: 1.37 10*3/MM3 (ref 0–1)
MONOCYTES NFR BLD AUTO: 10.7 % (ref 0–12)
MONOCYTES NFR BLD AUTO: 5.4 % (ref 0–12)
MONOCYTES NFR BLD AUTO: 5.6 % (ref 0–10)
MONOCYTES NFR BLD AUTO: 6.8 % (ref 0–10)
MONOCYTES NFR BLD AUTO: 7.3 % (ref 0–10)
MONOCYTES NFR BLD AUTO: 7.6 % (ref 0–10)
MONOCYTES NFR BLD AUTO: 8.4 % (ref 0–12)
MONOCYTES NFR BLD AUTO: 8.4 % (ref 0–12)
MONOCYTES NFR BLD AUTO: 8.8 % (ref 0–10)
MONOCYTES NFR BLD AUTO: 8.8 % (ref 0–12)
MONOCYTES NFR BLD AUTO: 9.8 % (ref 0–10)
MYOGLOBIN SERPL-MCNC: 104 NG/ML (ref 0–109)
MYOGLOBIN SERPL-MCNC: 128 NG/ML (ref 0–109)
NEUTROPHILS # BLD AUTO: 2.9 10*3/MM3 (ref 1.5–8.3)
NEUTROPHILS # BLD AUTO: 3.85 10*3/MM3 (ref 1.5–8.3)
NEUTROPHILS # BLD AUTO: 3.95 10*3/MM3 (ref 1.4–6.5)
NEUTROPHILS # BLD AUTO: 4.09 10*3/MM3 (ref 1.4–6.5)
NEUTROPHILS # BLD AUTO: 4.14 10*3/MM3 (ref 1.4–6.5)
NEUTROPHILS # BLD AUTO: 4.19 10*3/MM3 (ref 1.4–6.5)
NEUTROPHILS # BLD AUTO: 5.32 10*3/MM3 (ref 1.5–8.3)
NEUTROPHILS # BLD AUTO: 5.64 10*3/MM3 (ref 1.5–8.3)
NEUTROPHILS # BLD AUTO: 7.2 10*3/MM3 (ref 1.4–6.5)
NEUTROPHILS # BLD AUTO: 8.56 10*3/MM3 (ref 1.4–6.5)
NEUTROPHILS # BLD AUTO: 9.62 10*3/MM3 (ref 1.5–8.3)
NEUTROPHILS NFR BLD AUTO: 44.1 % (ref 41–71)
NEUTROPHILS NFR BLD AUTO: 46.5 % (ref 30–70)
NEUTROPHILS NFR BLD AUTO: 47.3 % (ref 30–70)
NEUTROPHILS NFR BLD AUTO: 49 % (ref 30–70)
NEUTROPHILS NFR BLD AUTO: 50.3 % (ref 41–71)
NEUTROPHILS NFR BLD AUTO: 55 % (ref 41–71)
NEUTROPHILS NFR BLD AUTO: 57.8 % (ref 30–70)
NEUTROPHILS NFR BLD AUTO: 58.1 % (ref 30–70)
NEUTROPHILS NFR BLD AUTO: 63.8 % (ref 30–70)
NEUTROPHILS NFR BLD AUTO: 66.3 % (ref 41–71)
NEUTROPHILS NFR BLD AUTO: 75 % (ref 41–71)
NITRITE UR QL STRIP: NEGATIVE
NITRITE UR QL STRIP: NEGATIVE
OPIATES UR QL: POSITIVE
OPIATES UR QL: POSITIVE
OSMOLALITY SERPL CALC.SUM OF ELEC: 266.2 MOSM/KG (ref 273–305)
OSMOLALITY SERPL CALC.SUM OF ELEC: 269.3 MOSM/KG (ref 273–305)
OSMOLALITY SERPL CALC.SUM OF ELEC: 274.8 MOSM/KG (ref 273–305)
OSMOLALITY SERPL CALC.SUM OF ELEC: 276.4 MOSM/KG (ref 273–305)
OSMOLALITY SERPL CALC.SUM OF ELEC: 281.1 MOSM/KG (ref 273–305)
OXYCODONE UR QL SCN: NEGATIVE
OXYCODONE UR QL SCN: NEGATIVE
OXYHGB MFR BLDV: 90.9 % (ref 94–99)
OXYHGB MFR BLDV: 92.2 % (ref 94–99)
OXYHGB MFR BLDV: 93.1 % (ref 94–99)
OXYHGB MFR BLDV: 95.7 % (ref 94–99)
OXYHGB MFR BLDV: 96.2 % (ref 94–99)
OXYHGB MFR BLDV: 97.6 % (ref 94–99)
PA ADP PRP-ACNC: 204 PRU
PA ADP PRP-ACNC: 233 PRU
PA ADP PRP-ACNC: NORMAL PRU
PA ADP PRP-ACNC: NORMAL PRU
PCO2 BLDA: 40.6 MM HG (ref 35–45)
PCO2 BLDA: 40.9 MM HG (ref 35–45)
PCO2 BLDA: 41.3 MM HG (ref 35–45)
PCO2 BLDA: 43.3 MM HG (ref 35–48)
PCO2 BLDA: 44.2 MM HG (ref 35–48)
PCO2 BLDA: 45 MM HG (ref 35–48)
PCO2 BLDA: 45.7 MM HG (ref 35–45)
PCO2 BLDA: 46.4 MM HG (ref 35–45)
PCO2 BLDA: 49.1 MM HG (ref 35–48)
PCO2 BLDA: 49.4 MM HG (ref 35–48)
PCO2 BLDA: 51.7 MM HG (ref 35–48)
PCP UR QL SCN: NEGATIVE
PCP UR QL SCN: NEGATIVE
PH BLDA: 7.25 PH UNITS (ref 7.35–7.45)
PH BLDA: 7.25 PH UNITS (ref 7.35–7.45)
PH BLDA: 7.27 PH UNITS (ref 7.35–7.45)
PH BLDA: 7.32 PH UNITS (ref 7.35–7.6)
PH BLDA: 7.33 PH UNITS (ref 7.35–7.45)
PH BLDA: 7.33 PH UNITS (ref 7.35–7.6)
PH BLDA: 7.34 PH UNITS (ref 7.35–7.45)
PH BLDA: 7.34 PH UNITS (ref 7.35–7.6)
PH BLDA: 7.35 PH UNITS (ref 7.35–7.45)
PH BLDA: 7.36 PH UNITS (ref 7.35–7.6)
PH BLDA: 7.36 PH UNITS (ref 7.35–7.6)
PH UR STRIP.AUTO: <=5 [PH] (ref 5–8)
PH UR STRIP.AUTO: <=5 [PH] (ref 5–8)
PHOSPHATE SERPL-MCNC: 2.5 MG/DL (ref 2.4–5.1)
PHOSPHATE SERPL-MCNC: 2.9 MG/DL (ref 2.4–5.1)
PHOSPHATE SERPL-MCNC: 3 MG/DL (ref 2.4–5.1)
PHOSPHATE SERPL-MCNC: 3.1 MG/DL (ref 2.4–5.1)
PHOSPHATE SERPL-MCNC: 3.1 MG/DL (ref 2.4–5.1)
PHOSPHATE SERPL-MCNC: 3.7 MG/DL (ref 2.4–5.1)
PHOSPHATE SERPL-MCNC: 3.8 MG/DL (ref 2.4–5.1)
PLAT MORPH BLD: NORMAL
PLATELET # BLD AUTO: 134 10*3/MM3 (ref 150–450)
PLATELET # BLD AUTO: 138 10*3/MM3 (ref 150–450)
PLATELET # BLD AUTO: 154 10*3/MM3 (ref 150–450)
PLATELET # BLD AUTO: 162 10*3/MM3 (ref 150–450)
PLATELET # BLD AUTO: 181 10*3/MM3 (ref 150–450)
PLATELET # BLD AUTO: 183 10*3/MM3 (ref 150–450)
PLATELET # BLD AUTO: 190 10*3/MM3 (ref 150–450)
PLATELET # BLD AUTO: 194 10*3/MM3 (ref 150–450)
PLATELET # BLD AUTO: 198 10*3/MM3 (ref 130–400)
PLATELET # BLD AUTO: 202 10*3/MM3 (ref 150–450)
PLATELET # BLD AUTO: 204 10*3/MM3 (ref 150–450)
PLATELET # BLD AUTO: 213 10*3/MM3 (ref 130–400)
PLATELET # BLD AUTO: 217 10*3/MM3 (ref 150–450)
PLATELET # BLD AUTO: 218 10*3/MM3 (ref 130–400)
PLATELET # BLD AUTO: 223 10*3/MM3 (ref 130–400)
PLATELET # BLD AUTO: 239 10*3/MM3 (ref 150–450)
PLATELET # BLD AUTO: 243 10*3/MM3 (ref 130–400)
PLATELET # BLD AUTO: 262 10*3/MM3 (ref 130–400)
PMV BLD AUTO: 10.2 FL (ref 6–10)
PMV BLD AUTO: 8.6 FL (ref 6–10)
PMV BLD AUTO: 8.8 FL (ref 6–10)
PMV BLD AUTO: 9 FL (ref 6–12)
PMV BLD AUTO: 9.1 FL (ref 6–10)
PMV BLD AUTO: 9.1 FL (ref 6–12)
PMV BLD AUTO: 9.1 FL (ref 6–12)
PMV BLD AUTO: 9.3 FL (ref 6–10)
PMV BLD AUTO: 9.4 FL (ref 6–12)
PMV BLD AUTO: 9.6 FL (ref 6–12)
PMV BLD AUTO: 9.6 FL (ref 6–12)
PMV BLD AUTO: 9.7 FL (ref 6–12)
PMV BLD AUTO: 9.8 FL (ref 6–10)
PMV BLD AUTO: 9.8 FL (ref 6–12)
PMV BLD AUTO: 9.8 FL (ref 6–12)
PMV BLD AUTO: 9.9 FL (ref 6–12)
PO2 BLDA: 102 MM HG (ref 83–108)
PO2 BLDA: 136 MMHG (ref 80–105)
PO2 BLDA: 221 MM HG (ref 83–108)
PO2 BLDA: 350 MMHG (ref 80–105)
PO2 BLDA: 430 MMHG (ref 80–105)
PO2 BLDA: 444 MMHG (ref 80–105)
PO2 BLDA: 68.2 MM HG (ref 83–108)
PO2 BLDA: 69 MM HG (ref 83–108)
PO2 BLDA: 81 MMHG (ref 80–105)
PO2 BLDA: 81.4 MM HG (ref 83–108)
PO2 BLDA: 93.3 MM HG (ref 83–108)
POTASSIUM BLD-SCNC: 3.2 MMOL/L (ref 3.5–5.3)
POTASSIUM BLD-SCNC: 3.5 MMOL/L (ref 3.5–5.3)
POTASSIUM BLD-SCNC: 3.6 MMOL/L (ref 3.5–5.3)
POTASSIUM BLD-SCNC: 3.8 MMOL/L (ref 3.5–5.3)
POTASSIUM BLD-SCNC: 3.8 MMOL/L (ref 3.5–5.5)
POTASSIUM BLD-SCNC: 3.9 MMOL/L (ref 3.5–5.3)
POTASSIUM BLD-SCNC: 3.9 MMOL/L (ref 3.5–5.5)
POTASSIUM BLD-SCNC: 4 MMOL/L (ref 3.5–5.5)
POTASSIUM BLD-SCNC: 4.1 MMOL/L (ref 3.5–5.5)
POTASSIUM BLD-SCNC: 4.1 MMOL/L (ref 3.5–5.5)
POTASSIUM BLD-SCNC: 4.2 MMOL/L (ref 3.5–5.5)
POTASSIUM BLD-SCNC: 4.2 MMOL/L (ref 3.5–5.5)
POTASSIUM BLD-SCNC: 4.3 MMOL/L (ref 3.5–5.5)
POTASSIUM BLD-SCNC: 4.4 MMOL/L (ref 3.5–5.5)
POTASSIUM BLD-SCNC: 4.6 MMOL/L (ref 3.5–5.5)
POTASSIUM BLD-SCNC: 4.7 MMOL/L (ref 3.5–5.5)
POTASSIUM BLD-SCNC: 4.8 MMOL/L (ref 3.5–5.5)
POTASSIUM BLD-SCNC: 4.8 MMOL/L (ref 3.5–5.5)
POTASSIUM BLD-SCNC: 5.1 MMOL/L (ref 3.5–5.5)
POTASSIUM BLD-SCNC: 5.3 MMOL/L (ref 3.5–5.5)
POTASSIUM BLDA-SCNC: 4.6 MMOL/L (ref 3.5–4.9)
POTASSIUM BLDA-SCNC: 4.7 MMOL/L (ref 3.5–4.9)
POTASSIUM BLDA-SCNC: 4.9 MMOL/L (ref 3.5–4.9)
POTASSIUM BLDA-SCNC: 5.6 MMOL/L (ref 3.5–4.9)
POTASSIUM BLDA-SCNC: 5.7 MMOL/L (ref 3.5–4.9)
PROT SERPL-MCNC: 5.6 G/DL (ref 5.7–8.2)
PROT SERPL-MCNC: 5.8 G/DL (ref 6–8)
PROT SERPL-MCNC: 6.4 G/DL (ref 6–8)
PROT SERPL-MCNC: 6.4 G/DL (ref 6–8)
PROT SERPL-MCNC: 6.8 G/DL (ref 5.7–8.2)
PROT SERPL-MCNC: 6.9 G/DL (ref 6–8)
PROT SERPL-MCNC: 7.1 G/DL (ref 6–8)
PROT UR QL STRIP: NEGATIVE
PROT UR QL STRIP: NEGATIVE
PROTHROMBIN TIME: 10.1 SECONDS (ref 9.6–11.5)
PROTHROMBIN TIME: 10.3 SECONDS (ref 9.6–11.5)
PROTHROMBIN TIME: 10.7 SECONDS (ref 9.6–11.5)
PROTHROMBIN TIME: 11 SECONDS (ref 9.6–11.5)
PROTHROMBIN TIME: 11 SECONDS (ref 9.6–11.5)
PROTHROMBIN TIME: 11.1 SECONDS (ref 9.6–11.5)
PROTHROMBIN TIME: 13.3 SECONDS (ref 11–15.4)
PROTHROMBIN TIME: 13.3 SECONDS (ref 11–15.4)
PROTHROMBIN TIME: 14.7 SECONDS (ref 11–15.4)
RBC # BLD AUTO: 2.37 10*6/MM3 (ref 4.2–5.76)
RBC # BLD AUTO: 2.42 10*6/MM3 (ref 4.2–5.76)
RBC # BLD AUTO: 2.62 10*6/MM3 (ref 4.2–5.76)
RBC # BLD AUTO: 2.8 10*6/MM3 (ref 4.2–5.76)
RBC # BLD AUTO: 2.87 10*6/MM3 (ref 4.2–5.76)
RBC # BLD AUTO: 2.93 10*6/MM3 (ref 4.2–5.76)
RBC # BLD AUTO: 3.81 10*6/MM3 (ref 4.2–5.76)
RBC # BLD AUTO: 3.89 10*6/MM3 (ref 4.2–5.76)
RBC # BLD AUTO: 4.22 10*6/MM3 (ref 4.2–5.76)
RBC # BLD AUTO: 4.37 10*6/MM3 (ref 4.7–6.1)
RBC # BLD AUTO: 4.48 10*6/MM3 (ref 4.2–5.76)
RBC # BLD AUTO: 4.48 10*6/MM3 (ref 4.7–6.1)
RBC # BLD AUTO: 4.63 10*6/MM3 (ref 4.7–6.1)
RBC # BLD AUTO: 4.65 10*6/MM3 (ref 4.2–5.76)
RBC # BLD AUTO: 4.91 10*6/MM3 (ref 4.2–5.76)
RBC # BLD AUTO: 4.93 10*6/MM3 (ref 4.7–6.1)
RBC # BLD AUTO: 4.95 10*6/MM3 (ref 4.7–6.1)
RBC # BLD AUTO: 5.65 10*6/MM3 (ref 4.7–6.1)
RBC MORPH BLD: NORMAL
RH BLD: POSITIVE
RH BLD: POSITIVE
SAO2 % BLDA: 100 % (ref 95–98)
SAO2 % BLDA: 95 % (ref 95–98)
SAO2 % BLDA: 99 % (ref 95–98)
SAO2 % BLDCOA: 92.2 %
SAO2 % BLDCOA: 92.2 %
SAO2 % BLDCOA: 95.7 %
SAO2 % BLDCOA: 95.7 %
SAO2 % BLDCOA: 96.2 %
SAO2 % BLDCOA: 96.2 %
SAO2 % BLDCOA: 98 %
SODIUM BLD-SCNC: 132 MMOL/L (ref 132–146)
SODIUM BLD-SCNC: 133 MMOL/L (ref 135–153)
SODIUM BLD-SCNC: 134 MMOL/L (ref 132–146)
SODIUM BLD-SCNC: 134 MMOL/L (ref 135–153)
SODIUM BLD-SCNC: 135 MMOL/L (ref 132–146)
SODIUM BLD-SCNC: 136 MMOL/L (ref 132–146)
SODIUM BLD-SCNC: 137 MMOL/L (ref 132–146)
SODIUM BLD-SCNC: 137 MMOL/L (ref 132–146)
SODIUM BLD-SCNC: 137 MMOL/L (ref 135–153)
SODIUM BLD-SCNC: 137 MMOL/L (ref 135–153)
SODIUM BLD-SCNC: 138 MMOL/L (ref 132–146)
SODIUM BLD-SCNC: 140 MMOL/L (ref 135–153)
SODIUM BLD-SCNC: 143 MMOL/L (ref 132–146)
SODIUM BLDA-SCNC: 131 MMOL/L (ref 138–146)
SODIUM BLDA-SCNC: 133 MMOL/L (ref 138–146)
SODIUM BLDA-SCNC: 135 MMOL/L (ref 138–146)
SODIUM BLDA-SCNC: 136 MMOL/L (ref 138–146)
SODIUM BLDA-SCNC: 137 MMOL/L (ref 138–146)
SP GR UR STRIP: 1.02 (ref 1–1.03)
SP GR UR STRIP: 1.02 (ref 1–1.03)
STRESS TARGET HR: 154 BPM
STRESS TARGET HR: 154 BPM
T&S EXPIRATION DATE: NORMAL
TRIGL SERPL-MCNC: 236 MG/DL (ref 0–150)
TRIGL SERPL-MCNC: 312 MG/DL (ref 0–150)
TRIGL SERPL-MCNC: 380 MG/DL (ref 0–150)
TRIGL SERPL-MCNC: 458 MG/DL (ref 0–150)
TROPONIN I SERPL-MCNC: 0.04 NG/ML
TROPONIN I SERPL-MCNC: 0.06 NG/ML
TROPONIN I SERPL-MCNC: 0.25 NG/ML
TROPONIN I SERPL-MCNC: 0.49 NG/ML
TROPONIN I SERPL-MCNC: 0.62 NG/ML
TROPONIN I SERPL-MCNC: 0.62 NG/ML
TROPONIN I SERPL-MCNC: 1.31 NG/ML
TROPONIN I SERPL-MCNC: 1.81 NG/ML
TROPONIN I SERPL-MCNC: 1.94 NG/ML
TROPONIN I SERPL-MCNC: 18.26 NG/ML
TROPONIN I SERPL-MCNC: 2.37 NG/ML
TROPONIN I SERPL-MCNC: 2.47 NG/ML
TROPONIN I SERPL-MCNC: 2.79 NG/ML
TROPONIN I SERPL-MCNC: 3.27 NG/ML
TROPONIN I SERPL-MCNC: 6.02 NG/ML
TROPONIN I SERPL-MCNC: 9.13 NG/ML
TSH SERPL DL<=0.05 MIU/L-ACNC: 4.61 MIU/ML (ref 0.35–5.35)
UNIT  ABO: NORMAL
UNIT  RH: NORMAL
UROBILINOGEN UR QL STRIP: ABNORMAL
UROBILINOGEN UR QL STRIP: NORMAL
VLDLC SERPL-MCNC: 47.2 MG/DL
VLDLC SERPL-MCNC: 62.4 MG/DL
VLDLC SERPL-MCNC: 76 MG/DL
WBC MORPH BLD: NORMAL
WBC NRBC COR # BLD: 10.45 10*3/MM3 (ref 3.5–10.8)
WBC NRBC COR # BLD: 10.46 10*3/MM3 (ref 3.5–10.8)
WBC NRBC COR # BLD: 11.18 10*3/MM3 (ref 3.5–10.8)
WBC NRBC COR # BLD: 11.27 10*3/MM3 (ref 4.5–12.5)
WBC NRBC COR # BLD: 12.83 10*3/MM3 (ref 3.5–10.8)
WBC NRBC COR # BLD: 14.83 10*3/MM3 (ref 4.5–12.5)
WBC NRBC COR # BLD: 6.57 10*3/MM3 (ref 3.5–10.8)
WBC NRBC COR # BLD: 7.13 10*3/MM3 (ref 4.5–12.5)
WBC NRBC COR # BLD: 7.56 10*3/MM3 (ref 3.5–10.8)
WBC NRBC COR # BLD: 7.65 10*3/MM3 (ref 3.5–10.8)
WBC NRBC COR # BLD: 7.9 10*3/MM3 (ref 3.5–10.8)
WBC NRBC COR # BLD: 8.14 10*3/MM3 (ref 3.5–10.8)
WBC NRBC COR # BLD: 8.37 10*3/MM3 (ref 4.5–12.5)
WBC NRBC COR # BLD: 8.37 10*3/MM3 (ref 4.5–12.5)
WBC NRBC COR # BLD: 8.49 10*3/MM3 (ref 3.5–10.8)
WBC NRBC COR # BLD: 8.7 10*3/MM3 (ref 3.5–10.8)
WBC NRBC COR # BLD: 9.02 10*3/MM3 (ref 4.5–12.5)
WBC NRBC COR # BLD: 9.66 10*3/MM3 (ref 3.5–10.8)

## 2018-01-01 PROCEDURE — 93306 TTE W/DOPPLER COMPLETE: CPT | Performed by: INTERNAL MEDICINE

## 2018-01-01 PROCEDURE — 99285 EMERGENCY DEPT VISIT HI MDM: CPT

## 2018-01-01 PROCEDURE — 99232 SBSQ HOSP IP/OBS MODERATE 35: CPT | Performed by: INTERNAL MEDICINE

## 2018-01-01 PROCEDURE — 85730 THROMBOPLASTIN TIME PARTIAL: CPT

## 2018-01-01 PROCEDURE — 99232 SBSQ HOSP IP/OBS MODERATE 35: CPT | Performed by: HOSPITALIST

## 2018-01-01 PROCEDURE — 85576 BLOOD PLATELET AGGREGATION: CPT | Performed by: PHYSICIAN ASSISTANT

## 2018-01-01 PROCEDURE — 25010000002 CEFUROXIME: Performed by: PHYSICIAN ASSISTANT

## 2018-01-01 PROCEDURE — 80053 COMPREHEN METABOLIC PANEL: CPT | Performed by: EMERGENCY MEDICINE

## 2018-01-01 PROCEDURE — 84484 ASSAY OF TROPONIN QUANT: CPT | Performed by: INTERNAL MEDICINE

## 2018-01-01 PROCEDURE — 80307 DRUG TEST PRSMV CHEM ANLYZR: CPT | Performed by: PHYSICIAN ASSISTANT

## 2018-01-01 PROCEDURE — 93458 L HRT ARTERY/VENTRICLE ANGIO: CPT | Performed by: INTERNAL MEDICINE

## 2018-01-01 PROCEDURE — 83605 ASSAY OF LACTIC ACID: CPT | Performed by: EMERGENCY MEDICINE

## 2018-01-01 PROCEDURE — 99291 CRITICAL CARE FIRST HOUR: CPT | Performed by: FAMILY MEDICINE

## 2018-01-01 PROCEDURE — 85025 COMPLETE CBC W/AUTO DIFF WBC: CPT | Performed by: PHYSICIAN ASSISTANT

## 2018-01-01 PROCEDURE — 25010000002 HEPARIN (PORCINE) PER 1000 UNITS: Performed by: INTERNAL MEDICINE

## 2018-01-01 PROCEDURE — 99406 BEHAV CHNG SMOKING 3-10 MIN: CPT | Performed by: PHYSICIAN ASSISTANT

## 2018-01-01 PROCEDURE — 25010000002 MIDAZOLAM PER 1 MG: Performed by: INTERNAL MEDICINE

## 2018-01-01 PROCEDURE — 96375 TX/PRO/DX INJ NEW DRUG ADDON: CPT

## 2018-01-01 PROCEDURE — 93005 ELECTROCARDIOGRAM TRACING: CPT | Performed by: PHYSICIAN ASSISTANT

## 2018-01-01 PROCEDURE — 85027 COMPLETE CBC AUTOMATED: CPT | Performed by: PHYSICIAN ASSISTANT

## 2018-01-01 PROCEDURE — 36415 COLL VENOUS BLD VENIPUNCTURE: CPT

## 2018-01-01 PROCEDURE — 25010000002 MORPHINE PER 10 MG: Performed by: FAMILY MEDICINE

## 2018-01-01 PROCEDURE — 021009W BYPASS CORONARY ARTERY, ONE ARTERY FROM AORTA WITH AUTOLOGOUS VENOUS TISSUE, OPEN APPROACH: ICD-10-PCS | Performed by: THORACIC SURGERY (CARDIOTHORACIC VASCULAR SURGERY)

## 2018-01-01 PROCEDURE — 83735 ASSAY OF MAGNESIUM: CPT | Performed by: PHYSICIAN ASSISTANT

## 2018-01-01 PROCEDURE — 94799 UNLISTED PULMONARY SVC/PX: CPT

## 2018-01-01 PROCEDURE — C1874 STENT, COATED/COV W/DEL SYS: HCPCS | Performed by: INTERNAL MEDICINE

## 2018-01-01 PROCEDURE — 82330 ASSAY OF CALCIUM: CPT | Performed by: PHYSICIAN ASSISTANT

## 2018-01-01 PROCEDURE — 93010 ELECTROCARDIOGRAM REPORT: CPT | Performed by: INTERNAL MEDICINE

## 2018-01-01 PROCEDURE — 33533 CABG ARTERIAL SINGLE: CPT | Performed by: THORACIC SURGERY (CARDIOTHORACIC VASCULAR SURGERY)

## 2018-01-01 PROCEDURE — 93005 ELECTROCARDIOGRAM TRACING: CPT | Performed by: NURSE PRACTITIONER

## 2018-01-01 PROCEDURE — 83690 ASSAY OF LIPASE: CPT | Performed by: PHYSICIAN ASSISTANT

## 2018-01-01 PROCEDURE — 96365 THER/PROPH/DIAG IV INF INIT: CPT

## 2018-01-01 PROCEDURE — 25010000002 ONDANSETRON PER 1 MG: Performed by: NURSE PRACTITIONER

## 2018-01-01 PROCEDURE — 81003 URINALYSIS AUTO W/O SCOPE: CPT | Performed by: PHYSICIAN ASSISTANT

## 2018-01-01 PROCEDURE — 97110 THERAPEUTIC EXERCISES: CPT

## 2018-01-01 PROCEDURE — 71045 X-RAY EXAM CHEST 1 VIEW: CPT | Performed by: RADIOLOGY

## 2018-01-01 PROCEDURE — 25010000002 HYDROMORPHONE PER 4 MG: Performed by: INTERNAL MEDICINE

## 2018-01-01 PROCEDURE — 86923 COMPATIBILITY TEST ELECTRIC: CPT

## 2018-01-01 PROCEDURE — 93572 IV DOP VEL&/PRESS C FLO EA: CPT | Performed by: INTERNAL MEDICINE

## 2018-01-01 PROCEDURE — 71046 X-RAY EXAM CHEST 2 VIEWS: CPT

## 2018-01-01 PROCEDURE — 99232 SBSQ HOSP IP/OBS MODERATE 35: CPT | Performed by: PHYSICIAN ASSISTANT

## 2018-01-01 PROCEDURE — 92928 PRQ TCAT PLMT NTRAC ST 1 LES: CPT | Performed by: INTERNAL MEDICINE

## 2018-01-01 PROCEDURE — 027034Z DILATION OF CORONARY ARTERY, ONE ARTERY WITH DRUG-ELUTING INTRALUMINAL DEVICE, PERCUTANEOUS APPROACH: ICD-10-PCS | Performed by: INTERNAL MEDICINE

## 2018-01-01 PROCEDURE — 80061 LIPID PANEL: CPT | Performed by: NURSE PRACTITIONER

## 2018-01-01 PROCEDURE — 99231 SBSQ HOSP IP/OBS SF/LOW 25: CPT | Performed by: THORACIC SURGERY (CARDIOTHORACIC VASCULAR SURGERY)

## 2018-01-01 PROCEDURE — 25010000002 HEPARIN (PORCINE) PER 1000 UNITS: Performed by: FAMILY MEDICINE

## 2018-01-01 PROCEDURE — 83735 ASSAY OF MAGNESIUM: CPT | Performed by: INTERNAL MEDICINE

## 2018-01-01 PROCEDURE — 93318 ECHO TRANSESOPHAGEAL INTRAOP: CPT | Performed by: ANESTHESIOLOGY

## 2018-01-01 PROCEDURE — 25010000002 HEPARIN (PORCINE) PER 1000 UNITS

## 2018-01-01 PROCEDURE — 86850 RBC ANTIBODY SCREEN: CPT | Performed by: PHYSICIAN ASSISTANT

## 2018-01-01 PROCEDURE — C9606 PERC D-E COR REVASC W AMI S: HCPCS | Performed by: INTERNAL MEDICINE

## 2018-01-01 PROCEDURE — 83036 HEMOGLOBIN GLYCOSYLATED A1C: CPT | Performed by: NURSE PRACTITIONER

## 2018-01-01 PROCEDURE — 99024 POSTOP FOLLOW-UP VISIT: CPT | Performed by: THORACIC SURGERY (CARDIOTHORACIC VASCULAR SURGERY)

## 2018-01-01 PROCEDURE — 85025 COMPLETE CBC W/AUTO DIFF WBC: CPT | Performed by: NURSE PRACTITIONER

## 2018-01-01 PROCEDURE — 71045 X-RAY EXAM CHEST 1 VIEW: CPT

## 2018-01-01 PROCEDURE — B2111ZZ FLUOROSCOPY OF MULTIPLE CORONARY ARTERIES USING LOW OSMOLAR CONTRAST: ICD-10-PCS | Performed by: INTERNAL MEDICINE

## 2018-01-01 PROCEDURE — C1887 CATHETER, GUIDING: HCPCS | Performed by: INTERNAL MEDICINE

## 2018-01-01 PROCEDURE — 25010000002 PHENYLEPHRINE PER 1 ML: Performed by: INTERNAL MEDICINE

## 2018-01-01 PROCEDURE — 82330 ASSAY OF CALCIUM: CPT | Performed by: NURSE PRACTITIONER

## 2018-01-01 PROCEDURE — 85730 THROMBOPLASTIN TIME PARTIAL: CPT | Performed by: NURSE PRACTITIONER

## 2018-01-01 PROCEDURE — 83735 ASSAY OF MAGNESIUM: CPT | Performed by: FAMILY MEDICINE

## 2018-01-01 PROCEDURE — 94640 AIRWAY INHALATION TREATMENT: CPT

## 2018-01-01 PROCEDURE — 02703ZZ DILATION OF CORONARY ARTERY, ONE ARTERY, PERCUTANEOUS APPROACH: ICD-10-PCS | Performed by: INTERNAL MEDICINE

## 2018-01-01 PROCEDURE — 94770: CPT

## 2018-01-01 PROCEDURE — 33533 CABG ARTERIAL SINGLE: CPT | Performed by: PHYSICIAN ASSISTANT

## 2018-01-01 PROCEDURE — 82330 ASSAY OF CALCIUM: CPT

## 2018-01-01 PROCEDURE — 84484 ASSAY OF TROPONIN QUANT: CPT | Performed by: FAMILY MEDICINE

## 2018-01-01 PROCEDURE — 93005 ELECTROCARDIOGRAM TRACING: CPT | Performed by: INTERNAL MEDICINE

## 2018-01-01 PROCEDURE — 94660 CPAP INITIATION&MGMT: CPT

## 2018-01-01 PROCEDURE — 25010000002 MORPHINE PER 10 MG: Performed by: EMERGENCY MEDICINE

## 2018-01-01 PROCEDURE — C1725 CATH, TRANSLUMIN NON-LASER: HCPCS | Performed by: INTERNAL MEDICINE

## 2018-01-01 PROCEDURE — 82550 ASSAY OF CK (CPK): CPT | Performed by: INTERNAL MEDICINE

## 2018-01-01 PROCEDURE — 86900 BLOOD TYPING SEROLOGIC ABO: CPT | Performed by: PHYSICIAN ASSISTANT

## 2018-01-01 PROCEDURE — 83880 ASSAY OF NATRIURETIC PEPTIDE: CPT | Performed by: NURSE PRACTITIONER

## 2018-01-01 PROCEDURE — C1769 GUIDE WIRE: HCPCS | Performed by: INTERNAL MEDICINE

## 2018-01-01 PROCEDURE — 80048 BASIC METABOLIC PNL TOTAL CA: CPT | Performed by: INTERNAL MEDICINE

## 2018-01-01 PROCEDURE — 99291 CRITICAL CARE FIRST HOUR: CPT | Performed by: INTERNAL MEDICINE

## 2018-01-01 PROCEDURE — 80048 BASIC METABOLIC PNL TOTAL CA: CPT | Performed by: NURSE PRACTITIONER

## 2018-01-01 PROCEDURE — 84443 ASSAY THYROID STIM HORMONE: CPT | Performed by: NURSE PRACTITIONER

## 2018-01-01 PROCEDURE — 33517 CABG ARTERY-VEIN SINGLE: CPT | Performed by: PHYSICIAN ASSISTANT

## 2018-01-01 PROCEDURE — 25010000002 MORPHINE PER 10 MG: Performed by: INTERNAL MEDICINE

## 2018-01-01 PROCEDURE — 83880 ASSAY OF NATRIURETIC PEPTIDE: CPT | Performed by: EMERGENCY MEDICINE

## 2018-01-01 PROCEDURE — 82553 CREATINE MB FRACTION: CPT | Performed by: INTERNAL MEDICINE

## 2018-01-01 PROCEDURE — C9600 PERC DRUG-EL COR STENT SING: HCPCS | Performed by: INTERNAL MEDICINE

## 2018-01-01 PROCEDURE — 80069 RENAL FUNCTION PANEL: CPT | Performed by: PHYSICIAN ASSISTANT

## 2018-01-01 PROCEDURE — 99233 SBSQ HOSP IP/OBS HIGH 50: CPT | Performed by: HOSPITALIST

## 2018-01-01 PROCEDURE — 99238 HOSP IP/OBS DSCHRG MGMT 30/<: CPT | Performed by: INTERNAL MEDICINE

## 2018-01-01 PROCEDURE — 85610 PROTHROMBIN TIME: CPT | Performed by: PHYSICIAN ASSISTANT

## 2018-01-01 PROCEDURE — C1894 INTRO/SHEATH, NON-LASER: HCPCS | Performed by: INTERNAL MEDICINE

## 2018-01-01 PROCEDURE — 25010000002 DOPAMINE PER 40 MG: Performed by: INTERNAL MEDICINE

## 2018-01-01 PROCEDURE — 80048 BASIC METABOLIC PNL TOTAL CA: CPT | Performed by: PHYSICIAN ASSISTANT

## 2018-01-01 PROCEDURE — 92950 HEART/LUNG RESUSCITATION CPR: CPT | Performed by: INTERNAL MEDICINE

## 2018-01-01 PROCEDURE — 84484 ASSAY OF TROPONIN QUANT: CPT | Performed by: NURSE PRACTITIONER

## 2018-01-01 PROCEDURE — 25010000002 EPTIFIBATIDE 20 MG/10ML SOLUTION: Performed by: INTERNAL MEDICINE

## 2018-01-01 PROCEDURE — 25010000002 SULFUR HEXAFLUORIDE MICROSPH 60.7-25 MG RECONSTITUTED SUSPENSION: Performed by: HOSPITALIST

## 2018-01-01 PROCEDURE — 85027 COMPLETE CBC AUTOMATED: CPT | Performed by: NURSE PRACTITIONER

## 2018-01-01 PROCEDURE — 99233 SBSQ HOSP IP/OBS HIGH 50: CPT | Performed by: INTERNAL MEDICINE

## 2018-01-01 PROCEDURE — 25810000003 DEXTROSE 5 % WITH KCL 20 MEQ 20-5 MEQ/L-% SOLUTION: Performed by: PHYSICIAN ASSISTANT

## 2018-01-01 PROCEDURE — 25010000002 HYDROMORPHONE PER 4 MG: Performed by: NURSE PRACTITIONER

## 2018-01-01 PROCEDURE — 25010000003 DOPAMINE PER 40 MG: Performed by: ANESTHESIOLOGY

## 2018-01-01 PROCEDURE — 83735 ASSAY OF MAGNESIUM: CPT | Performed by: EMERGENCY MEDICINE

## 2018-01-01 PROCEDURE — 82550 ASSAY OF CK (CPK): CPT | Performed by: PHYSICIAN ASSISTANT

## 2018-01-01 PROCEDURE — 83874 ASSAY OF MYOGLOBIN: CPT | Performed by: INTERNAL MEDICINE

## 2018-01-01 PROCEDURE — 02100Z9 BYPASS CORONARY ARTERY, ONE ARTERY FROM LEFT INTERNAL MAMMARY, OPEN APPROACH: ICD-10-PCS | Performed by: THORACIC SURGERY (CARDIOTHORACIC VASCULAR SURGERY)

## 2018-01-01 PROCEDURE — 99222 1ST HOSP IP/OBS MODERATE 55: CPT | Performed by: INTERNAL MEDICINE

## 2018-01-01 PROCEDURE — 84484 ASSAY OF TROPONIN QUANT: CPT | Performed by: PHYSICIAN ASSISTANT

## 2018-01-01 PROCEDURE — 25010000002 PHENYLEPHRINE 10 MG/ML SOLUTION 5 ML VIAL: Performed by: INTERNAL MEDICINE

## 2018-01-01 PROCEDURE — B221Z2Z COMPUTERIZED TOMOGRAPHY (CT SCAN) OF MULTIPLE CORONARY ARTERIES USING INTRAVASCULAR OPTICAL COHERENCE: ICD-10-PCS | Performed by: INTERNAL MEDICINE

## 2018-01-01 PROCEDURE — 99239 HOSP IP/OBS DSCHRG MGMT >30: CPT | Performed by: HOSPITALIST

## 2018-01-01 PROCEDURE — 71046 X-RAY EXAM CHEST 2 VIEWS: CPT | Performed by: RADIOLOGY

## 2018-01-01 PROCEDURE — 25010000002 MORPHINE SULFATE (PF) 2 MG/ML SOLUTION: Performed by: NURSE PRACTITIONER

## 2018-01-01 PROCEDURE — 99233 SBSQ HOSP IP/OBS HIGH 50: CPT | Performed by: NURSE PRACTITIONER

## 2018-01-01 PROCEDURE — 85730 THROMBOPLASTIN TIME PARTIAL: CPT | Performed by: EMERGENCY MEDICINE

## 2018-01-01 PROCEDURE — 80053 COMPREHEN METABOLIC PANEL: CPT | Performed by: INTERNAL MEDICINE

## 2018-01-01 PROCEDURE — 85025 COMPLETE CBC W/AUTO DIFF WBC: CPT | Performed by: INTERNAL MEDICINE

## 2018-01-01 PROCEDURE — B24BZZ4 ULTRASONOGRAPHY OF HEART WITH AORTA, TRANSESOPHAGEAL: ICD-10-PCS | Performed by: ANESTHESIOLOGY

## 2018-01-01 PROCEDURE — 97166 OT EVAL MOD COMPLEX 45 MIN: CPT

## 2018-01-01 PROCEDURE — 99024 POSTOP FOLLOW-UP VISIT: CPT | Performed by: PHYSICIAN ASSISTANT

## 2018-01-01 PROCEDURE — 99284 EMERGENCY DEPT VISIT MOD MDM: CPT

## 2018-01-01 PROCEDURE — 93571 IV DOP VEL&/PRESS C FLO 1ST: CPT | Performed by: INTERNAL MEDICINE

## 2018-01-01 PROCEDURE — C1757 CATH, THROMBECTOMY/EMBOLECT: HCPCS | Performed by: INTERNAL MEDICINE

## 2018-01-01 PROCEDURE — 85576 BLOOD PLATELET AGGREGATION: CPT | Performed by: INTERNAL MEDICINE

## 2018-01-01 PROCEDURE — 80053 COMPREHEN METABOLIC PANEL: CPT | Performed by: FAMILY MEDICINE

## 2018-01-01 PROCEDURE — 25010000002 FENTANYL CITRATE (PF) 100 MCG/2ML SOLUTION: Performed by: INTERNAL MEDICINE

## 2018-01-01 PROCEDURE — 83880 ASSAY OF NATRIURETIC PEPTIDE: CPT | Performed by: PHYSICIAN ASSISTANT

## 2018-01-01 PROCEDURE — 92978 ENDOLUMINL IVUS OCT C 1ST: CPT | Performed by: INTERNAL MEDICINE

## 2018-01-01 PROCEDURE — 82962 GLUCOSE BLOOD TEST: CPT

## 2018-01-01 PROCEDURE — 80053 COMPREHEN METABOLIC PANEL: CPT | Performed by: PHYSICIAN ASSISTANT

## 2018-01-01 PROCEDURE — 82805 BLOOD GASES W/O2 SATURATION: CPT | Performed by: THORACIC SURGERY (CARDIOTHORACIC VASCULAR SURGERY)

## 2018-01-01 PROCEDURE — C1751 CATH, INF, PER/CENT/MIDLINE: HCPCS | Performed by: ANESTHESIOLOGY

## 2018-01-01 PROCEDURE — 85610 PROTHROMBIN TIME: CPT | Performed by: NURSE PRACTITIONER

## 2018-01-01 PROCEDURE — 82553 CREATINE MB FRACTION: CPT | Performed by: PHYSICIAN ASSISTANT

## 2018-01-01 PROCEDURE — 84295 ASSAY OF SERUM SODIUM: CPT

## 2018-01-01 PROCEDURE — 93880 EXTRACRANIAL BILAT STUDY: CPT

## 2018-01-01 PROCEDURE — 80069 RENAL FUNCTION PANEL: CPT | Performed by: HOSPITALIST

## 2018-01-01 PROCEDURE — 25010000002 HYDROMORPHONE PER 4 MG: Performed by: FAMILY MEDICINE

## 2018-01-01 PROCEDURE — 96376 TX/PRO/DX INJ SAME DRUG ADON: CPT

## 2018-01-01 PROCEDURE — G0378 HOSPITAL OBSERVATION PER HR: HCPCS

## 2018-01-01 PROCEDURE — 0 IOPAMIDOL PER 1 ML: Performed by: INTERNAL MEDICINE

## 2018-01-01 PROCEDURE — 25010000002 PROTAMINE SULFATE PER 10 MG

## 2018-01-01 PROCEDURE — P9017 PLASMA 1 DONOR FRZ W/IN 8 HR: HCPCS

## 2018-01-01 PROCEDURE — 25010000003 CEFAZOLIN PER 500 MG: Performed by: PHYSICIAN ASSISTANT

## 2018-01-01 PROCEDURE — 93005 ELECTROCARDIOGRAM TRACING: CPT | Performed by: EMERGENCY MEDICINE

## 2018-01-01 PROCEDURE — 85347 COAGULATION TIME ACTIVATED: CPT

## 2018-01-01 PROCEDURE — 85025 COMPLETE CBC W/AUTO DIFF WBC: CPT

## 2018-01-01 PROCEDURE — 85730 THROMBOPLASTIN TIME PARTIAL: CPT | Performed by: PHYSICIAN ASSISTANT

## 2018-01-01 PROCEDURE — 93880 EXTRACRANIAL BILAT STUDY: CPT | Performed by: INTERNAL MEDICINE

## 2018-01-01 PROCEDURE — 25010000002 HEPARIN (PORCINE) PER 1000 UNITS: Performed by: THORACIC SURGERY (CARDIOTHORACIC VASCULAR SURGERY)

## 2018-01-01 PROCEDURE — 85014 HEMATOCRIT: CPT | Performed by: PHYSICIAN ASSISTANT

## 2018-01-01 PROCEDURE — 82150 ASSAY OF AMYLASE: CPT | Performed by: PHYSICIAN ASSISTANT

## 2018-01-01 PROCEDURE — 96361 HYDRATE IV INFUSION ADD-ON: CPT

## 2018-01-01 PROCEDURE — 84484 ASSAY OF TROPONIN QUANT: CPT | Performed by: EMERGENCY MEDICINE

## 2018-01-01 PROCEDURE — 92941 PRQ TRLML REVSC TOT OCCL AMI: CPT | Performed by: INTERNAL MEDICINE

## 2018-01-01 PROCEDURE — 83036 HEMOGLOBIN GLYCOSYLATED A1C: CPT | Performed by: PHYSICIAN ASSISTANT

## 2018-01-01 PROCEDURE — 25010000002 FENTANYL CITRATE (PF) 100 MCG/2ML SOLUTION: Performed by: THORACIC SURGERY (CARDIOTHORACIC VASCULAR SURGERY)

## 2018-01-01 PROCEDURE — 25010000002 PROPOFOL 1000 MG/ML EMULSION: Performed by: THORACIC SURGERY (CARDIOTHORACIC VASCULAR SURGERY)

## 2018-01-01 PROCEDURE — 96368 THER/DIAG CONCURRENT INF: CPT

## 2018-01-01 PROCEDURE — 84100 ASSAY OF PHOSPHORUS: CPT | Performed by: NURSE PRACTITIONER

## 2018-01-01 PROCEDURE — 25010000002 HEPARIN (PORCINE) PER 1000 UNITS: Performed by: NURSE PRACTITIONER

## 2018-01-01 PROCEDURE — 83036 HEMOGLOBIN GLYCOSYLATED A1C: CPT | Performed by: INTERNAL MEDICINE

## 2018-01-01 PROCEDURE — 85018 HEMOGLOBIN: CPT | Performed by: PHYSICIAN ASSISTANT

## 2018-01-01 PROCEDURE — 80061 LIPID PANEL: CPT | Performed by: EMERGENCY MEDICINE

## 2018-01-01 PROCEDURE — 85730 THROMBOPLASTIN TIME PARTIAL: CPT | Performed by: FAMILY MEDICINE

## 2018-01-01 PROCEDURE — 85610 PROTHROMBIN TIME: CPT | Performed by: EMERGENCY MEDICINE

## 2018-01-01 PROCEDURE — 25010000002 PROMETHAZINE PER 50 MG: Performed by: EMERGENCY MEDICINE

## 2018-01-01 PROCEDURE — A4648 IMPLANTABLE TISSUE MARKER: HCPCS | Performed by: THORACIC SURGERY (CARDIOTHORACIC VASCULAR SURGERY)

## 2018-01-01 PROCEDURE — 85014 HEMATOCRIT: CPT

## 2018-01-01 PROCEDURE — 93005 ELECTROCARDIOGRAM TRACING: CPT | Performed by: FAMILY MEDICINE

## 2018-01-01 PROCEDURE — 5A1221Z PERFORMANCE OF CARDIAC OUTPUT, CONTINUOUS: ICD-10-PCS | Performed by: THORACIC SURGERY (CARDIOTHORACIC VASCULAR SURGERY)

## 2018-01-01 PROCEDURE — 25010000002 EPTIFIBATIDE PER 5 MG: Performed by: INTERNAL MEDICINE

## 2018-01-01 PROCEDURE — 97530 THERAPEUTIC ACTIVITIES: CPT

## 2018-01-01 PROCEDURE — 85652 RBC SED RATE AUTOMATED: CPT | Performed by: INTERNAL MEDICINE

## 2018-01-01 PROCEDURE — 85025 COMPLETE CBC W/AUTO DIFF WBC: CPT | Performed by: FAMILY MEDICINE

## 2018-01-01 PROCEDURE — 99024 POSTOP FOLLOW-UP VISIT: CPT | Performed by: NURSE PRACTITIONER

## 2018-01-01 PROCEDURE — 63710000001 INSULIN LISPRO (HUMAN) PER 5 UNITS: Performed by: INTERNAL MEDICINE

## 2018-01-01 PROCEDURE — 25010000002 HEPARIN (PORCINE) PER 1000 UNITS: Performed by: ANESTHESIOLOGY

## 2018-01-01 PROCEDURE — 86927 PLASMA FRESH FROZEN: CPT

## 2018-01-01 PROCEDURE — 4A023N7 MEASUREMENT OF CARDIAC SAMPLING AND PRESSURE, LEFT HEART, PERCUTANEOUS APPROACH: ICD-10-PCS | Performed by: INTERNAL MEDICINE

## 2018-01-01 PROCEDURE — C1753 CATH, INTRAVAS ULTRASOUND: HCPCS | Performed by: INTERNAL MEDICINE

## 2018-01-01 PROCEDURE — 84100 ASSAY OF PHOSPHORUS: CPT | Performed by: INTERNAL MEDICINE

## 2018-01-01 PROCEDURE — 96374 THER/PROPH/DIAG INJ IV PUSH: CPT

## 2018-01-01 PROCEDURE — 25010000002 MORPHINE PER 10 MG: Performed by: THORACIC SURGERY (CARDIOTHORACIC VASCULAR SURGERY)

## 2018-01-01 PROCEDURE — 25010000002 ALBUMIN HUMAN 5% PER 50 ML: Performed by: PHYSICIAN ASSISTANT

## 2018-01-01 PROCEDURE — 87040 BLOOD CULTURE FOR BACTERIA: CPT | Performed by: NURSE PRACTITIONER

## 2018-01-01 PROCEDURE — 85025 COMPLETE CBC W/AUTO DIFF WBC: CPT | Performed by: EMERGENCY MEDICINE

## 2018-01-01 PROCEDURE — 25010000002 HEPARIN (PORCINE) PER 1000 UNITS: Performed by: PHYSICIAN ASSISTANT

## 2018-01-01 PROCEDURE — 93455 CORONARY ART/GRFT ANGIO S&I: CPT | Performed by: INTERNAL MEDICINE

## 2018-01-01 PROCEDURE — 25010000002 ONDANSETRON PER 1 MG: Performed by: PHYSICIAN ASSISTANT

## 2018-01-01 PROCEDURE — 85610 PROTHROMBIN TIME: CPT

## 2018-01-01 PROCEDURE — 33517 CABG ARTERY-VEIN SINGLE: CPT | Performed by: THORACIC SURGERY (CARDIOTHORACIC VASCULAR SURGERY)

## 2018-01-01 PROCEDURE — 99232 SBSQ HOSP IP/OBS MODERATE 35: CPT | Performed by: NURSE PRACTITIONER

## 2018-01-01 PROCEDURE — 86901 BLOOD TYPING SEROLOGIC RH(D): CPT | Performed by: PHYSICIAN ASSISTANT

## 2018-01-01 PROCEDURE — 83735 ASSAY OF MAGNESIUM: CPT | Performed by: NURSE PRACTITIONER

## 2018-01-01 PROCEDURE — 99223 1ST HOSP IP/OBS HIGH 75: CPT | Performed by: THORACIC SURGERY (CARDIOTHORACIC VASCULAR SURGERY)

## 2018-01-01 PROCEDURE — 25010000002 PAPAVERINE PER 60 MG: Performed by: THORACIC SURGERY (CARDIOTHORACIC VASCULAR SURGERY)

## 2018-01-01 PROCEDURE — 06BP4ZZ EXCISION OF RIGHT SAPHENOUS VEIN, PERCUTANEOUS ENDOSCOPIC APPROACH: ICD-10-PCS | Performed by: THORACIC SURGERY (CARDIOTHORACIC VASCULAR SURGERY)

## 2018-01-01 PROCEDURE — P9041 ALBUMIN (HUMAN),5%, 50ML: HCPCS | Performed by: PHYSICIAN ASSISTANT

## 2018-01-01 PROCEDURE — 82947 ASSAY GLUCOSE BLOOD QUANT: CPT

## 2018-01-01 PROCEDURE — 99236 HOSP IP/OBS SAME DATE HI 85: CPT | Performed by: INTERNAL MEDICINE

## 2018-01-01 PROCEDURE — 92920 PRQ TRLUML C ANGIOP 1ART&/BR: CPT | Performed by: INTERNAL MEDICINE

## 2018-01-01 PROCEDURE — 85007 BL SMEAR W/DIFF WBC COUNT: CPT | Performed by: INTERNAL MEDICINE

## 2018-01-01 PROCEDURE — 93306 TTE W/DOPPLER COMPLETE: CPT

## 2018-01-01 PROCEDURE — B2151ZZ FLUOROSCOPY OF LEFT HEART USING LOW OSMOLAR CONTRAST: ICD-10-PCS | Performed by: INTERNAL MEDICINE

## 2018-01-01 PROCEDURE — 94002 VENT MGMT INPAT INIT DAY: CPT

## 2018-01-01 PROCEDURE — 86901 BLOOD TYPING SEROLOGIC RH(D): CPT

## 2018-01-01 PROCEDURE — 25010000002 HEPARIN (PORCINE) PER 1000 UNITS: Performed by: EMERGENCY MEDICINE

## 2018-01-01 PROCEDURE — 25010000002 MIDAZOLAM PER 1 MG: Performed by: ANESTHESIOLOGY

## 2018-01-01 PROCEDURE — 85610 PROTHROMBIN TIME: CPT | Performed by: FAMILY MEDICINE

## 2018-01-01 PROCEDURE — 94010 BREATHING CAPACITY TEST: CPT

## 2018-01-01 PROCEDURE — 80061 LIPID PANEL: CPT | Performed by: INTERNAL MEDICINE

## 2018-01-01 PROCEDURE — 36430 TRANSFUSION BLD/BLD COMPNT: CPT

## 2018-01-01 PROCEDURE — 99222 1ST HOSP IP/OBS MODERATE 55: CPT | Performed by: FAMILY MEDICINE

## 2018-01-01 PROCEDURE — 25010000002 EPINEPHRINE PF 1 MG/10ML SOLUTION PREFILLED SYRINGE: Performed by: INTERNAL MEDICINE

## 2018-01-01 PROCEDURE — 33967 INSERT I-AORT PERCUT DEVICE: CPT | Performed by: INTERNAL MEDICINE

## 2018-01-01 PROCEDURE — 80053 COMPREHEN METABOLIC PANEL: CPT | Performed by: NURSE PRACTITIONER

## 2018-01-01 PROCEDURE — 85730 THROMBOPLASTIN TIME PARTIAL: CPT | Performed by: HOSPITALIST

## 2018-01-01 PROCEDURE — P9035 PLATELET PHERES LEUKOREDUCED: HCPCS

## 2018-01-01 PROCEDURE — 0 IOPAMIDOL PER 1 ML

## 2018-01-01 PROCEDURE — 86900 BLOOD TYPING SEROLOGIC ABO: CPT

## 2018-01-01 PROCEDURE — 82803 BLOOD GASES ANY COMBINATION: CPT

## 2018-01-01 PROCEDURE — 82805 BLOOD GASES W/O2 SATURATION: CPT | Performed by: PHYSICIAN ASSISTANT

## 2018-01-01 PROCEDURE — 25010000002 PROTAMINE SULFATE PER 10 MG: Performed by: ANESTHESIOLOGY

## 2018-01-01 PROCEDURE — 85730 THROMBOPLASTIN TIME PARTIAL: CPT | Performed by: INTERNAL MEDICINE

## 2018-01-01 PROCEDURE — 83690 ASSAY OF LIPASE: CPT | Performed by: EMERGENCY MEDICINE

## 2018-01-01 PROCEDURE — 85027 COMPLETE CBC AUTOMATED: CPT

## 2018-01-01 PROCEDURE — 33508 ENDOSCOPIC VEIN HARVEST: CPT | Performed by: THORACIC SURGERY (CARDIOTHORACIC VASCULAR SURGERY)

## 2018-01-01 PROCEDURE — 25010000002 HYDROMORPHONE PER 4 MG

## 2018-01-01 PROCEDURE — 94010 BREATHING CAPACITY TEST: CPT | Performed by: INTERNAL MEDICINE

## 2018-01-01 PROCEDURE — 83880 ASSAY OF NATRIURETIC PEPTIDE: CPT | Performed by: FAMILY MEDICINE

## 2018-01-01 PROCEDURE — 99231 SBSQ HOSP IP/OBS SF/LOW 25: CPT | Performed by: NURSE PRACTITIONER

## 2018-01-01 PROCEDURE — 97163 PT EVAL HIGH COMPLEX 45 MIN: CPT

## 2018-01-01 PROCEDURE — 84132 ASSAY OF SERUM POTASSIUM: CPT

## 2018-01-01 PROCEDURE — 99214 OFFICE O/P EST MOD 30 MIN: CPT | Performed by: INTERNAL MEDICINE

## 2018-01-01 DEVICE — XIENCE ALPINE EVEROLIMUS ELUTING CORONARY STENT SYSTEM 3.00 MM X 18 MM / RAPID-EXCHANGE
Type: IMPLANTABLE DEVICE | Status: FUNCTIONAL
Brand: XIENCE ALPINE

## 2018-01-01 DEVICE — XIENCE SIERRA™ EVEROLIMUS ELUTING CORONARY STENT SYSTEM 3.00 MM X 23 MM / RAPID-EXCHANGE
Type: IMPLANTABLE DEVICE | Status: FUNCTIONAL
Brand: XIENCE SIERRA™

## 2018-01-01 DEVICE — DISK-SHAPED STYLE, SILICONE (1 PER STERILE PKG)
Type: IMPLANTABLE DEVICE | Site: HEART | Status: FUNCTIONAL
Brand: SCANLAN® RADIOMARK® GRAFT MARKERS

## 2018-01-01 RX ORDER — HEPARIN SODIUM 1000 [USP'U]/ML
3000 INJECTION, SOLUTION INTRAVENOUS; SUBCUTANEOUS ONCE
Status: COMPLETED | OUTPATIENT
Start: 2018-01-01 | End: 2018-01-01

## 2018-01-01 RX ORDER — MORPHINE SULFATE 1 MG/ML
2 INJECTION, SOLUTION EPIDURAL; INTRATHECAL; INTRAVENOUS EVERY 6 HOURS PRN
Status: DISCONTINUED | OUTPATIENT
Start: 2018-01-01 | End: 2018-01-01

## 2018-01-01 RX ORDER — NITROGLYCERIN 0.4 MG/1
0.4 TABLET SUBLINGUAL
Status: DISCONTINUED | OUTPATIENT
Start: 2018-01-01 | End: 2018-01-01 | Stop reason: HOSPADM

## 2018-01-01 RX ORDER — EPTIFIBATIDE 0.75 MG/ML
2 INJECTION, SOLUTION INTRAVENOUS CONTINUOUS
Status: DISCONTINUED | OUTPATIENT
Start: 2018-01-01 | End: 2018-01-01

## 2018-01-01 RX ORDER — PROTAMINE SULFATE 10 MG/ML
50 INJECTION, SOLUTION INTRAVENOUS ONCE
Status: DISCONTINUED | OUTPATIENT
Start: 2018-01-01 | End: 2018-01-01

## 2018-01-01 RX ORDER — ROSUVASTATIN CALCIUM 20 MG/1
40 TABLET, COATED ORAL DAILY
Status: DISCONTINUED | OUTPATIENT
Start: 2018-01-01 | End: 2018-01-01

## 2018-01-01 RX ORDER — MEPERIDINE HYDROCHLORIDE 25 MG/ML
25 INJECTION INTRAMUSCULAR; INTRAVENOUS; SUBCUTANEOUS EVERY 4 HOURS PRN
Status: ACTIVE | OUTPATIENT
Start: 2018-01-01 | End: 2018-01-01

## 2018-01-01 RX ORDER — MORPHINE SULFATE 30 MG/1
30 TABLET, FILM COATED, EXTENDED RELEASE ORAL ONCE
Status: COMPLETED | OUTPATIENT
Start: 2018-01-01 | End: 2018-01-01

## 2018-01-01 RX ORDER — SODIUM CHLORIDE 9 MG/ML
75 INJECTION, SOLUTION INTRAVENOUS CONTINUOUS
Status: ACTIVE | OUTPATIENT
Start: 2018-01-01 | End: 2018-01-01

## 2018-01-01 RX ORDER — ATORVASTATIN CALCIUM 10 MG/1
10 TABLET, FILM COATED ORAL DAILY
COMMUNITY
End: 2018-01-01 | Stop reason: HOSPADM

## 2018-01-01 RX ORDER — MAGNESIUM SULFATE HEPTAHYDRATE 40 MG/ML
2 INJECTION, SOLUTION INTRAVENOUS AS NEEDED
Status: DISCONTINUED | OUTPATIENT
Start: 2018-01-01 | End: 2018-01-01

## 2018-01-01 RX ORDER — POTASSIUM CHLORIDE 29.8 MG/ML
20 INJECTION INTRAVENOUS
Status: DISCONTINUED | OUTPATIENT
Start: 2018-01-01 | End: 2018-01-01

## 2018-01-01 RX ORDER — DIAZEPAM 5 MG/1
10 TABLET ORAL NIGHTLY PRN
Status: DISCONTINUED | OUTPATIENT
Start: 2018-01-01 | End: 2018-01-01 | Stop reason: HOSPADM

## 2018-01-01 RX ORDER — ONDANSETRON 2 MG/ML
4 INJECTION INTRAMUSCULAR; INTRAVENOUS ONCE
Status: COMPLETED | OUTPATIENT
Start: 2018-01-01 | End: 2018-01-01

## 2018-01-01 RX ORDER — METOPROLOL TARTRATE 5 MG/5ML
5 INJECTION INTRAVENOUS ONCE
Status: COMPLETED | OUTPATIENT
Start: 2018-01-01 | End: 2018-01-01

## 2018-01-01 RX ORDER — HEPARIN SODIUM 1000 [USP'U]/ML
INJECTION, SOLUTION INTRAVENOUS; SUBCUTANEOUS AS NEEDED
Status: DISCONTINUED | OUTPATIENT
Start: 2018-01-01 | End: 2018-01-01 | Stop reason: HOSPADM

## 2018-01-01 RX ORDER — LIDOCAINE HYDROCHLORIDE 10 MG/ML
INJECTION, SOLUTION EPIDURAL; INFILTRATION; INTRACAUDAL; PERINEURAL AS NEEDED
Status: DISCONTINUED | OUTPATIENT
Start: 2018-01-01 | End: 2018-01-01 | Stop reason: HOSPADM

## 2018-01-01 RX ORDER — DOPAMINE HYDROCHLORIDE 160 MG/100ML
INJECTION, SOLUTION INTRAVENOUS CONTINUOUS PRN
Status: COMPLETED | OUTPATIENT
Start: 2018-01-01 | End: 2018-01-01

## 2018-01-01 RX ORDER — PROTAMINE SULFATE 10 MG/ML
INJECTION, SOLUTION INTRAVENOUS AS NEEDED
Status: DISCONTINUED | OUTPATIENT
Start: 2018-01-01 | End: 2018-01-01 | Stop reason: SURG

## 2018-01-01 RX ORDER — HYDROCODONE BITARTRATE AND ACETAMINOPHEN 5; 325 MG/1; MG/1
1 TABLET ORAL ONCE
Status: DISCONTINUED | OUTPATIENT
Start: 2018-01-01 | End: 2018-01-01

## 2018-01-01 RX ORDER — SODIUM CHLORIDE 9 MG/ML
100 INJECTION, SOLUTION INTRAVENOUS CONTINUOUS
Status: ACTIVE | OUTPATIENT
Start: 2018-01-01 | End: 2018-01-01

## 2018-01-01 RX ORDER — MORPHINE SULFATE 30 MG/1
30 TABLET, FILM COATED, EXTENDED RELEASE ORAL EVERY 12 HOURS SCHEDULED
Status: DISCONTINUED | OUTPATIENT
Start: 2018-01-01 | End: 2018-01-01 | Stop reason: HOSPADM

## 2018-01-01 RX ORDER — PANTOPRAZOLE SODIUM 40 MG/1
40 TABLET, DELAYED RELEASE ORAL EVERY MORNING
Status: DISCONTINUED | OUTPATIENT
Start: 2018-01-01 | End: 2018-01-01 | Stop reason: HOSPADM

## 2018-01-01 RX ORDER — ROCURONIUM BROMIDE 10 MG/ML
INJECTION, SOLUTION INTRAVENOUS AS NEEDED
Status: DISCONTINUED | OUTPATIENT
Start: 2018-01-01 | End: 2018-01-01 | Stop reason: SURG

## 2018-01-01 RX ORDER — CITALOPRAM 20 MG/1
40 TABLET ORAL DAILY
Status: DISCONTINUED | OUTPATIENT
Start: 2018-01-01 | End: 2018-01-01 | Stop reason: HOSPADM

## 2018-01-01 RX ORDER — MAGNESIUM HYDROXIDE 1200 MG/15ML
LIQUID ORAL AS NEEDED
Status: DISCONTINUED | OUTPATIENT
Start: 2018-01-01 | End: 2018-01-01 | Stop reason: HOSPADM

## 2018-01-01 RX ORDER — ATORVASTATIN CALCIUM 40 MG/1
80 TABLET, FILM COATED ORAL NIGHTLY
Status: DISCONTINUED | OUTPATIENT
Start: 2018-01-01 | End: 2018-01-01 | Stop reason: HOSPADM

## 2018-01-01 RX ORDER — NITROGLYCERIN 20 MG/100ML
5-200 INJECTION INTRAVENOUS
Status: DISCONTINUED | OUTPATIENT
Start: 2018-01-01 | End: 2018-01-01

## 2018-01-01 RX ORDER — ISOSORBIDE MONONITRATE 30 MG/1
90 TABLET, EXTENDED RELEASE ORAL DAILY
Qty: 90 TABLET | Refills: 3 | Status: SHIPPED | OUTPATIENT
Start: 2018-01-01 | End: 2018-01-01 | Stop reason: HOSPADM

## 2018-01-01 RX ORDER — MORPHINE SULFATE 2 MG/ML
6 INJECTION, SOLUTION INTRAMUSCULAR; INTRAVENOUS ONCE
Status: COMPLETED | OUTPATIENT
Start: 2018-01-01 | End: 2018-01-01

## 2018-01-01 RX ORDER — EPTIFIBATIDE 20 MG/10ML
INJECTION INTRAVENOUS AS NEEDED
Status: DISCONTINUED | OUTPATIENT
Start: 2018-01-01 | End: 2018-01-01 | Stop reason: HOSPADM

## 2018-01-01 RX ORDER — EPTIFIBATIDE 0.75 MG/ML
INJECTION, SOLUTION INTRAVENOUS CONTINUOUS PRN
Status: COMPLETED | OUTPATIENT
Start: 2018-01-01 | End: 2018-01-01

## 2018-01-01 RX ORDER — PROTAMINE SULFATE 10 MG/ML
INJECTION, SOLUTION INTRAVENOUS
Status: COMPLETED
Start: 2018-01-01 | End: 2018-01-01

## 2018-01-01 RX ORDER — BUPROPION HYDROCHLORIDE 150 MG/1
300 TABLET ORAL DAILY
Status: DISCONTINUED | OUTPATIENT
Start: 2018-01-01 | End: 2018-01-01 | Stop reason: HOSPADM

## 2018-01-01 RX ORDER — ACETAMINOPHEN 325 MG/1
650 TABLET ORAL EVERY 4 HOURS PRN
Status: DISCONTINUED | OUTPATIENT
Start: 2018-01-01 | End: 2018-01-01 | Stop reason: HOSPADM

## 2018-01-01 RX ORDER — MIDAZOLAM HYDROCHLORIDE 1 MG/ML
INJECTION INTRAMUSCULAR; INTRAVENOUS AS NEEDED
Status: DISCONTINUED | OUTPATIENT
Start: 2018-01-01 | End: 2018-01-01 | Stop reason: HOSPADM

## 2018-01-01 RX ORDER — ALBUMIN, HUMAN INJ 5% 5 %
500 SOLUTION INTRAVENOUS AS NEEDED
Status: DISCONTINUED | OUTPATIENT
Start: 2018-01-01 | End: 2018-01-01

## 2018-01-01 RX ORDER — CARVEDILOL 6.25 MG/1
6.25 TABLET ORAL 2 TIMES DAILY
Status: DISCONTINUED | OUTPATIENT
Start: 2018-01-01 | End: 2018-01-01

## 2018-01-01 RX ORDER — SODIUM CHLORIDE 0.9 % (FLUSH) 0.9 %
10 SYRINGE (ML) INJECTION AS NEEDED
Status: DISCONTINUED | OUTPATIENT
Start: 2018-01-01 | End: 2018-01-01 | Stop reason: HOSPADM

## 2018-01-01 RX ORDER — SUFENTANIL CITRATE 50 UG/ML
INJECTION EPIDURAL; INTRAVENOUS AS NEEDED
Status: DISCONTINUED | OUTPATIENT
Start: 2018-01-01 | End: 2018-01-01 | Stop reason: SURG

## 2018-01-01 RX ORDER — SODIUM CHLORIDE 0.9 % (FLUSH) 0.9 %
1-10 SYRINGE (ML) INJECTION AS NEEDED
Status: DISCONTINUED | OUTPATIENT
Start: 2018-01-01 | End: 2018-01-01 | Stop reason: HOSPADM

## 2018-01-01 RX ORDER — BISACODYL 10 MG
10 SUPPOSITORY, RECTAL RECTAL DAILY PRN
Status: DISCONTINUED | OUTPATIENT
Start: 2018-01-01 | End: 2018-01-01

## 2018-01-01 RX ORDER — HEPARIN SODIUM 5000 [USP'U]/ML
40 INJECTION, SOLUTION INTRAVENOUS; SUBCUTANEOUS AS NEEDED
Status: DISCONTINUED | OUTPATIENT
Start: 2018-01-01 | End: 2018-01-01 | Stop reason: HOSPADM

## 2018-01-01 RX ORDER — SENNA AND DOCUSATE SODIUM 50; 8.6 MG/1; MG/1
2 TABLET, FILM COATED ORAL 2 TIMES DAILY
Status: DISCONTINUED | OUTPATIENT
Start: 2018-01-01 | End: 2018-01-01 | Stop reason: HOSPADM

## 2018-01-01 RX ORDER — MEPERIDINE HYDROCHLORIDE 25 MG/ML
12.5 INJECTION INTRAMUSCULAR; INTRAVENOUS; SUBCUTANEOUS ONCE
Status: COMPLETED | OUTPATIENT
Start: 2018-01-01 | End: 2018-01-01

## 2018-01-01 RX ORDER — LEVOTHYROXINE SODIUM 0.05 MG/1
50 TABLET ORAL DAILY
Status: DISCONTINUED | OUTPATIENT
Start: 2018-01-01 | End: 2018-01-01 | Stop reason: HOSPADM

## 2018-01-01 RX ORDER — CALCIUM CHLORIDE 100 MG/ML
INJECTION INTRAVENOUS; INTRAVENTRICULAR AS NEEDED
Status: DISCONTINUED | OUTPATIENT
Start: 2018-01-01 | End: 2018-01-01 | Stop reason: SURG

## 2018-01-01 RX ORDER — PROTAMINE SULFATE 10 MG/ML
100 INJECTION, SOLUTION INTRAVENOUS ONCE
Status: COMPLETED | OUTPATIENT
Start: 2018-01-01 | End: 2018-01-01

## 2018-01-01 RX ORDER — MEPERIDINE HYDROCHLORIDE 25 MG/ML
25 INJECTION INTRAMUSCULAR; INTRAVENOUS; SUBCUTANEOUS ONCE
Status: COMPLETED | OUTPATIENT
Start: 2018-01-01 | End: 2018-01-01

## 2018-01-01 RX ORDER — SODIUM CHLORIDE 9 MG/ML
50 INJECTION, SOLUTION INTRAVENOUS CONTINUOUS
Status: DISCONTINUED | OUTPATIENT
Start: 2018-01-01 | End: 2018-01-01 | Stop reason: HOSPADM

## 2018-01-01 RX ORDER — ISOSORBIDE MONONITRATE 30 MG/1
30 TABLET, EXTENDED RELEASE ORAL ONCE
Status: COMPLETED | OUTPATIENT
Start: 2018-01-01 | End: 2018-01-01

## 2018-01-01 RX ORDER — HEPARIN SODIUM 5000 [USP'U]/ML
5000 INJECTION, SOLUTION INTRAVENOUS; SUBCUTANEOUS EVERY 8 HOURS SCHEDULED
Status: DISCONTINUED | OUTPATIENT
Start: 2018-01-01 | End: 2018-01-01 | Stop reason: HOSPADM

## 2018-01-01 RX ORDER — ASPIRIN 325 MG
325 TABLET ORAL ONCE
Status: COMPLETED | OUTPATIENT
Start: 2018-01-01 | End: 2018-01-01

## 2018-01-01 RX ORDER — FENTANYL CITRATE 50 UG/ML
INJECTION, SOLUTION INTRAMUSCULAR; INTRAVENOUS AS NEEDED
Status: DISCONTINUED | OUTPATIENT
Start: 2018-01-01 | End: 2018-01-01 | Stop reason: HOSPADM

## 2018-01-01 RX ORDER — OXYCODONE AND ACETAMINOPHEN 10; 325 MG/1; MG/1
1 TABLET ORAL EVERY 4 HOURS PRN
Status: DISCONTINUED | OUTPATIENT
Start: 2018-01-01 | End: 2018-01-01

## 2018-01-01 RX ORDER — SODIUM CHLORIDE 9 MG/ML
30 INJECTION, SOLUTION INTRAVENOUS CONTINUOUS PRN
Status: DISCONTINUED | OUTPATIENT
Start: 2018-01-01 | End: 2018-01-01

## 2018-01-01 RX ORDER — HEPARIN SODIUM 5000 [USP'U]/ML
4000 INJECTION, SOLUTION INTRAVENOUS; SUBCUTANEOUS EVERY 8 HOURS SCHEDULED
Status: DISCONTINUED | OUTPATIENT
Start: 2018-01-01 | End: 2018-12-27 | Stop reason: HOSPADM

## 2018-01-01 RX ORDER — HYDROMORPHONE HCL 110MG/55ML
1 PATIENT CONTROLLED ANALGESIA SYRINGE INTRAVENOUS ONCE
Status: COMPLETED | OUTPATIENT
Start: 2018-01-01 | End: 2018-01-01

## 2018-01-01 RX ORDER — MAGNESIUM CARB/ALUMINUM HYDROX 105-160MG
150 TABLET,CHEWABLE ORAL ONCE
Status: DISCONTINUED | OUTPATIENT
Start: 2018-01-01 | End: 2018-01-01

## 2018-01-01 RX ORDER — POTASSIUM CHLORIDE 750 MG/1
40 CAPSULE, EXTENDED RELEASE ORAL AS NEEDED
Status: DISCONTINUED | OUTPATIENT
Start: 2018-01-01 | End: 2018-01-01

## 2018-01-01 RX ORDER — NITROGLYCERIN 20 MG/100ML
10-50 INJECTION INTRAVENOUS
Status: DISCONTINUED | OUTPATIENT
Start: 2018-01-01 | End: 2018-01-01 | Stop reason: HOSPADM

## 2018-01-01 RX ORDER — TRAZODONE HYDROCHLORIDE 50 MG/1
50 TABLET ORAL NIGHTLY PRN
Status: DISCONTINUED | OUTPATIENT
Start: 2018-01-01 | End: 2018-01-01 | Stop reason: HOSPADM

## 2018-01-01 RX ORDER — MAGNESIUM SULFATE HEPTAHYDRATE 40 MG/ML
4 INJECTION, SOLUTION INTRAVENOUS AS NEEDED
Status: DISCONTINUED | OUTPATIENT
Start: 2018-01-01 | End: 2018-01-01

## 2018-01-01 RX ORDER — DIAZEPAM 5 MG/1
10 TABLET ORAL NIGHTLY PRN
Status: DISCONTINUED | OUTPATIENT
Start: 2018-01-01 | End: 2018-01-01

## 2018-01-01 RX ORDER — HYDROCODONE BITARTRATE AND ACETAMINOPHEN 7.5; 325 MG/1; MG/1
1 TABLET ORAL EVERY 4 HOURS PRN
Status: DISCONTINUED | OUTPATIENT
Start: 2018-01-01 | End: 2018-01-01

## 2018-01-01 RX ORDER — EPTIFIBATIDE 2 MG/ML
INJECTION, SOLUTION INTRAVENOUS AS NEEDED
Status: DISCONTINUED | OUTPATIENT
Start: 2018-01-01 | End: 2018-01-01 | Stop reason: HOSPADM

## 2018-01-01 RX ORDER — ACETAMINOPHEN 325 MG/1
650 TABLET ORAL EVERY 6 HOURS PRN
Status: DISCONTINUED | OUTPATIENT
Start: 2018-01-01 | End: 2018-01-01 | Stop reason: HOSPADM

## 2018-01-01 RX ORDER — HEPARIN SODIUM 1000 [USP'U]/ML
7500 INJECTION, SOLUTION INTRAVENOUS; SUBCUTANEOUS ONCE
Status: COMPLETED | OUTPATIENT
Start: 2018-01-01 | End: 2018-01-01

## 2018-01-01 RX ORDER — KETOTIFEN FUMARATE 0.35 MG/ML
1 SOLUTION/ DROPS OPHTHALMIC 2 TIMES DAILY PRN
Status: DISCONTINUED | OUTPATIENT
Start: 2018-01-01 | End: 2018-01-01 | Stop reason: HOSPADM

## 2018-01-01 RX ORDER — LEVOTHYROXINE SODIUM 0.05 MG/1
50 TABLET ORAL
Status: DISCONTINUED | OUTPATIENT
Start: 2018-01-01 | End: 2018-01-01 | Stop reason: HOSPADM

## 2018-01-01 RX ORDER — ATORVASTATIN CALCIUM 80 MG/1
80 TABLET, FILM COATED ORAL NIGHTLY
Status: ON HOLD
Start: 2018-01-01 | End: 2018-01-01

## 2018-01-01 RX ORDER — CHLORHEXIDINE GLUCONATE 0.12 MG/ML
15 RINSE ORAL EVERY 12 HOURS SCHEDULED
Status: DISCONTINUED | OUTPATIENT
Start: 2018-01-01 | End: 2018-01-01

## 2018-01-01 RX ORDER — METOPROLOL TARTRATE 5 MG/5ML
2.5 INJECTION INTRAVENOUS EVERY 6 HOURS SCHEDULED
Status: DISCONTINUED | OUTPATIENT
Start: 2018-01-01 | End: 2018-01-01

## 2018-01-01 RX ORDER — ASPIRIN 81 MG/1
81 TABLET, CHEWABLE ORAL DAILY
Status: DISCONTINUED | OUTPATIENT
Start: 2018-01-01 | End: 2018-01-01 | Stop reason: HOSPADM

## 2018-01-01 RX ORDER — FAMOTIDINE 20 MG/1
20 TABLET, FILM COATED ORAL ONCE
Status: COMPLETED | OUTPATIENT
Start: 2018-01-01 | End: 2018-01-01

## 2018-01-01 RX ORDER — SODIUM CHLORIDE 0.9 % (FLUSH) 0.9 %
1-10 SYRINGE (ML) INJECTION AS NEEDED
Status: DISCONTINUED | OUTPATIENT
Start: 2018-01-01 | End: 2018-01-01

## 2018-01-01 RX ORDER — PAPAVERINE HYDROCHLORIDE 30 MG/ML
INJECTION INTRAMUSCULAR; INTRAVENOUS AS NEEDED
Status: DISCONTINUED | OUTPATIENT
Start: 2018-01-01 | End: 2018-01-01 | Stop reason: HOSPADM

## 2018-01-01 RX ORDER — ASPIRIN 81 MG/1
81 TABLET ORAL DAILY
Status: DISCONTINUED | OUTPATIENT
Start: 2018-01-01 | End: 2018-01-01

## 2018-01-01 RX ORDER — PHENYLEPHRINE HCL IN 0.9% NACL 0.5 MG/5ML
.5-3 SYRINGE (ML) INTRAVENOUS CONTINUOUS PRN
Status: DISCONTINUED | OUTPATIENT
Start: 2018-01-01 | End: 2018-01-01

## 2018-01-01 RX ORDER — BISACODYL 5 MG/1
10 TABLET, DELAYED RELEASE ORAL DAILY PRN
Status: DISCONTINUED | OUTPATIENT
Start: 2018-01-01 | End: 2018-01-01 | Stop reason: HOSPADM

## 2018-01-01 RX ORDER — MORPHINE SULFATE 15 MG/1
15 TABLET, FILM COATED, EXTENDED RELEASE ORAL ONCE
Status: COMPLETED | OUTPATIENT
Start: 2018-01-01 | End: 2018-01-01

## 2018-01-01 RX ORDER — NITROGLYCERIN 20 MG/100ML
5-200 INJECTION INTRAVENOUS
Status: DISCONTINUED | OUTPATIENT
Start: 2018-01-01 | End: 2018-01-01 | Stop reason: HOSPADM

## 2018-01-01 RX ORDER — HEPARIN SODIUM 5000 [USP'U]/ML
40 INJECTION, SOLUTION INTRAVENOUS; SUBCUTANEOUS ONCE
Status: COMPLETED | OUTPATIENT
Start: 2018-01-01 | End: 2018-01-01

## 2018-01-01 RX ORDER — ROSUVASTATIN CALCIUM 20 MG/1
40 TABLET, COATED ORAL NIGHTLY
Status: DISCONTINUED | OUTPATIENT
Start: 2018-01-01 | End: 2018-01-01 | Stop reason: HOSPADM

## 2018-01-01 RX ORDER — SODIUM CHLORIDE 9 MG/ML
INJECTION, SOLUTION INTRAVENOUS AS NEEDED
Status: DISCONTINUED | OUTPATIENT
Start: 2018-01-01 | End: 2018-01-01 | Stop reason: HOSPADM

## 2018-01-01 RX ORDER — CARVEDILOL 6.25 MG/1
6.25 TABLET ORAL 2 TIMES DAILY
Status: DISCONTINUED | OUTPATIENT
Start: 2018-01-01 | End: 2018-01-01 | Stop reason: HOSPADM

## 2018-01-01 RX ORDER — DOPAMINE HYDROCHLORIDE 80 MG/100ML
INJECTION, SOLUTION INTRAVENOUS CONTINUOUS PRN
Status: DISCONTINUED | OUTPATIENT
Start: 2018-01-01 | End: 2018-01-01 | Stop reason: SURG

## 2018-01-01 RX ORDER — MORPHINE SULFATE 30 MG/1
30 TABLET, FILM COATED, EXTENDED RELEASE ORAL EVERY 12 HOURS SCHEDULED
Status: DISCONTINUED | OUTPATIENT
Start: 2018-01-01 | End: 2018-01-01

## 2018-01-01 RX ORDER — MORPHINE SULFATE 1 MG/ML
2 INJECTION, SOLUTION EPIDURAL; INTRATHECAL; INTRAVENOUS EVERY 4 HOURS PRN
Status: DISCONTINUED | OUTPATIENT
Start: 2018-01-01 | End: 2018-01-01

## 2018-01-01 RX ORDER — HYDROMORPHONE HYDROCHLORIDE 1 MG/ML
0.5 INJECTION, SOLUTION INTRAMUSCULAR; INTRAVENOUS; SUBCUTANEOUS ONCE
Status: COMPLETED | OUTPATIENT
Start: 2018-01-01 | End: 2018-01-01

## 2018-01-01 RX ORDER — RANOLAZINE 500 MG/1
500 TABLET, EXTENDED RELEASE ORAL 2 TIMES DAILY
Qty: 60 TABLET | Refills: 3 | Status: SHIPPED | OUTPATIENT
Start: 2018-01-01 | End: 2018-01-01 | Stop reason: HOSPADM

## 2018-01-01 RX ORDER — ONDANSETRON 2 MG/ML
4 INJECTION INTRAMUSCULAR; INTRAVENOUS EVERY 6 HOURS PRN
Status: DISCONTINUED | OUTPATIENT
Start: 2018-01-01 | End: 2018-01-01 | Stop reason: HOSPADM

## 2018-01-01 RX ORDER — NICARDIPINE HCL-0.9% SOD CHLOR 1 MG/10 ML
SYRINGE (ML) INTRAVENOUS AS NEEDED
Status: DISCONTINUED | OUTPATIENT
Start: 2018-01-01 | End: 2018-01-01 | Stop reason: HOSPADM

## 2018-01-01 RX ORDER — MAGNESIUM CARB/ALUMINUM HYDROX 105-160MG
150 TABLET,CHEWABLE ORAL ONCE
Status: COMPLETED | OUTPATIENT
Start: 2018-01-01 | End: 2018-01-01

## 2018-01-01 RX ORDER — SODIUM CHLORIDE, SODIUM LACTATE, POTASSIUM CHLORIDE, CALCIUM CHLORIDE 600; 310; 30; 20 MG/100ML; MG/100ML; MG/100ML; MG/100ML
9 INJECTION, SOLUTION INTRAVENOUS CONTINUOUS
Status: DISCONTINUED | OUTPATIENT
Start: 2018-01-01 | End: 2018-01-01

## 2018-01-01 RX ORDER — LIDOCAINE HYDROCHLORIDE 20 MG/ML
INJECTION, SOLUTION INFILTRATION; PERINEURAL AS NEEDED
Status: DISCONTINUED | OUTPATIENT
Start: 2018-01-01 | End: 2018-01-01 | Stop reason: HOSPADM

## 2018-01-01 RX ORDER — HYDROCODONE BITARTRATE AND ACETAMINOPHEN 10; 325 MG/1; MG/1
TABLET ORAL
Status: COMPLETED
Start: 2018-01-01 | End: 2018-01-01

## 2018-01-01 RX ORDER — ASPIRIN 81 MG/1
81 TABLET ORAL DAILY
Status: DISCONTINUED | OUTPATIENT
Start: 2018-01-01 | End: 2018-01-01 | Stop reason: HOSPADM

## 2018-01-01 RX ORDER — PROMETHAZINE HYDROCHLORIDE 25 MG/ML
12.5 INJECTION, SOLUTION INTRAMUSCULAR; INTRAVENOUS ONCE
Status: COMPLETED | OUTPATIENT
Start: 2018-01-01 | End: 2018-01-01

## 2018-01-01 RX ORDER — MIDAZOLAM HYDROCHLORIDE 1 MG/ML
INJECTION INTRAMUSCULAR; INTRAVENOUS AS NEEDED
Status: DISCONTINUED | OUTPATIENT
Start: 2018-01-01 | End: 2018-01-01 | Stop reason: SURG

## 2018-01-01 RX ORDER — FEBUXOSTAT 40 MG/1
40 TABLET, FILM COATED ORAL DAILY
Status: DISCONTINUED | OUTPATIENT
Start: 2018-01-01 | End: 2018-01-01 | Stop reason: HOSPADM

## 2018-01-01 RX ORDER — HEPARIN SODIUM 5000 [USP'U]/ML
INJECTION, SOLUTION INTRAVENOUS; SUBCUTANEOUS
Status: DISPENSED
Start: 2018-01-01 | End: 2018-12-25

## 2018-01-01 RX ORDER — FEBUXOSTAT 40 MG/1
40 TABLET, FILM COATED ORAL DAILY
COMMUNITY

## 2018-01-01 RX ORDER — ASPIRIN 325 MG
325 TABLET ORAL DAILY
Status: DISCONTINUED | OUTPATIENT
Start: 2018-01-01 | End: 2018-01-01

## 2018-01-01 RX ORDER — FENTANYL CITRATE 50 UG/ML
25 INJECTION, SOLUTION INTRAMUSCULAR; INTRAVENOUS
Status: DISCONTINUED | OUTPATIENT
Start: 2018-01-01 | End: 2018-01-01

## 2018-01-01 RX ORDER — HYDROMORPHONE HYDROCHLORIDE 1 MG/ML
1 INJECTION, SOLUTION INTRAMUSCULAR; INTRAVENOUS; SUBCUTANEOUS ONCE AS NEEDED
Status: COMPLETED | OUTPATIENT
Start: 2018-01-01 | End: 2018-01-01

## 2018-01-01 RX ORDER — HEPARIN SODIUM 1000 [USP'U]/ML
4000 INJECTION, SOLUTION INTRAVENOUS; SUBCUTANEOUS ONCE
Status: COMPLETED | OUTPATIENT
Start: 2018-01-01 | End: 2018-01-01

## 2018-01-01 RX ORDER — KETOTIFEN FUMARATE 0.35 MG/ML
1 SOLUTION/ DROPS OPHTHALMIC 2 TIMES DAILY
Status: DISCONTINUED | OUTPATIENT
Start: 2018-01-01 | End: 2018-01-01 | Stop reason: HOSPADM

## 2018-01-01 RX ORDER — ASPIRIN 325 MG
325 TABLET, DELAYED RELEASE (ENTERIC COATED) ORAL DAILY
Status: DISCONTINUED | OUTPATIENT
Start: 2018-01-01 | End: 2018-01-01 | Stop reason: HOSPADM

## 2018-01-01 RX ORDER — MORPHINE SULFATE 2 MG/ML
2 INJECTION, SOLUTION INTRAMUSCULAR; INTRAVENOUS
Status: DISCONTINUED | OUTPATIENT
Start: 2018-01-01 | End: 2018-01-01 | Stop reason: HOSPADM

## 2018-01-01 RX ORDER — ROSUVASTATIN CALCIUM 20 MG/1
40 TABLET, COATED ORAL NIGHTLY
Status: ON HOLD | COMMUNITY
End: 2018-01-01

## 2018-01-01 RX ORDER — MORPHINE SULFATE 30 MG/1
30 TABLET, FILM COATED, EXTENDED RELEASE ORAL 2 TIMES DAILY
Status: DISCONTINUED | OUTPATIENT
Start: 2018-01-01 | End: 2018-01-01 | Stop reason: HOSPADM

## 2018-01-01 RX ORDER — MORPHINE SULFATE 1 MG/ML
2 INJECTION, SOLUTION EPIDURAL; INTRATHECAL; INTRAVENOUS
Status: DISCONTINUED | OUTPATIENT
Start: 2018-01-01 | End: 2018-01-01

## 2018-01-01 RX ORDER — FEBUXOSTAT 80 MG/1
80 TABLET, FILM COATED ORAL DAILY
Status: CANCELLED | OUTPATIENT
Start: 2018-01-01

## 2018-01-01 RX ORDER — ASPIRIN 81 MG/1
81 TABLET, CHEWABLE ORAL DAILY
Status: DISCONTINUED | OUTPATIENT
Start: 2018-01-01 | End: 2018-01-01

## 2018-01-01 RX ORDER — HEPARIN SODIUM 1000 [USP'U]/ML
5000 INJECTION, SOLUTION INTRAVENOUS; SUBCUTANEOUS ONCE
Status: COMPLETED | OUTPATIENT
Start: 2018-01-01 | End: 2018-01-01

## 2018-01-01 RX ORDER — NICOTINE 21 MG/24HR
1 PATCH, TRANSDERMAL 24 HOURS TRANSDERMAL
Status: DISCONTINUED | OUTPATIENT
Start: 2018-01-01 | End: 2018-01-01 | Stop reason: HOSPADM

## 2018-01-01 RX ORDER — CARVEDILOL 6.25 MG/1
6.25 TABLET ORAL 2 TIMES DAILY WITH MEALS
Qty: 60 TABLET | Refills: 3 | Status: SHIPPED | OUTPATIENT
Start: 2018-01-01

## 2018-01-01 RX ORDER — ETOMIDATE 2 MG/ML
INJECTION INTRAVENOUS AS NEEDED
Status: DISCONTINUED | OUTPATIENT
Start: 2018-01-01 | End: 2018-01-01 | Stop reason: SURG

## 2018-01-01 RX ORDER — ASPIRIN 81 MG/1
81 TABLET ORAL DAILY
Status: CANCELLED | OUTPATIENT
Start: 2018-01-01

## 2018-01-01 RX ORDER — RANOLAZINE 500 MG/1
500 TABLET, EXTENDED RELEASE ORAL 2 TIMES DAILY
Status: DISCONTINUED | OUTPATIENT
Start: 2018-01-01 | End: 2018-01-01 | Stop reason: HOSPADM

## 2018-01-01 RX ORDER — ALBUTEROL SULFATE 2.5 MG/3ML
2.5 SOLUTION RESPIRATORY (INHALATION)
Status: COMPLETED | OUTPATIENT
Start: 2018-01-01 | End: 2018-01-01

## 2018-01-01 RX ORDER — MORPHINE SULFATE 2 MG/ML
2 INJECTION, SOLUTION INTRAMUSCULAR; INTRAVENOUS ONCE
Status: DISCONTINUED | OUTPATIENT
Start: 2018-01-01 | End: 2018-01-01

## 2018-01-01 RX ORDER — DEXMEDETOMIDINE HYDROCHLORIDE 4 UG/ML
.2-1.5 INJECTION, SOLUTION INTRAVENOUS CONTINUOUS PRN
Status: DISCONTINUED | OUTPATIENT
Start: 2018-01-01 | End: 2018-01-01

## 2018-01-01 RX ORDER — SODIUM CHLORIDE 0.9 % (FLUSH) 0.9 %
1-10 SYRINGE (ML) INJECTION EVERY 8 HOURS
Status: DISCONTINUED | OUTPATIENT
Start: 2018-01-01 | End: 2018-01-01

## 2018-01-01 RX ORDER — LIDOCAINE HYDROCHLORIDE 10 MG/ML
0.5 INJECTION, SOLUTION EPIDURAL; INFILTRATION; INTRACAUDAL; PERINEURAL ONCE AS NEEDED
Status: DISCONTINUED | OUTPATIENT
Start: 2018-01-01 | End: 2018-01-01 | Stop reason: HOSPADM

## 2018-01-01 RX ORDER — NITROGLYCERIN 20 MG/100ML
5-200 INJECTION INTRAVENOUS CONTINUOUS PRN
Status: DISCONTINUED | OUTPATIENT
Start: 2018-01-01 | End: 2018-01-01

## 2018-01-01 RX ORDER — MORPHINE SULFATE 2 MG/ML
2 INJECTION, SOLUTION INTRAMUSCULAR; INTRAVENOUS EVERY 4 HOURS PRN
Status: DISCONTINUED | OUTPATIENT
Start: 2018-01-01 | End: 2018-01-01

## 2018-01-01 RX ORDER — PANTOPRAZOLE SODIUM 40 MG/1
40 TABLET, DELAYED RELEASE ORAL
Status: DISCONTINUED | OUTPATIENT
Start: 2018-01-01 | End: 2018-01-01

## 2018-01-01 RX ORDER — SODIUM CHLORIDE 0.9 % (FLUSH) 0.9 %
30 SYRINGE (ML) INJECTION ONCE AS NEEDED
Status: DISCONTINUED | OUTPATIENT
Start: 2018-01-01 | End: 2018-01-01

## 2018-01-01 RX ORDER — AMINOCAPROIC ACID 250 MG/ML
INJECTION, SOLUTION INTRAVENOUS AS NEEDED
Status: DISCONTINUED | OUTPATIENT
Start: 2018-01-01 | End: 2018-01-01 | Stop reason: SURG

## 2018-01-01 RX ORDER — ATROPINE SULFATE 1 MG/ML
INJECTION, SOLUTION INTRAMUSCULAR; INTRAVENOUS; SUBCUTANEOUS AS NEEDED
Status: DISCONTINUED | OUTPATIENT
Start: 2018-01-01 | End: 2018-01-01 | Stop reason: HOSPADM

## 2018-01-01 RX ORDER — CARVEDILOL 6.25 MG/1
6.25 TABLET ORAL 2 TIMES DAILY WITH MEALS
Status: DISCONTINUED | OUTPATIENT
Start: 2018-01-01 | End: 2018-01-01 | Stop reason: HOSPADM

## 2018-01-01 RX ORDER — MORPHINE SULFATE 2 MG/ML
4 INJECTION, SOLUTION INTRAMUSCULAR; INTRAVENOUS
Status: DISCONTINUED | OUTPATIENT
Start: 2018-01-01 | End: 2018-01-01

## 2018-01-01 RX ORDER — DOBUTAMINE HYDROCHLORIDE 100 MG/100ML
2-20 INJECTION INTRAVENOUS CONTINUOUS PRN
Status: DISCONTINUED | OUTPATIENT
Start: 2018-01-01 | End: 2018-01-01

## 2018-01-01 RX ORDER — NALOXONE HCL 0.4 MG/ML
0.4 VIAL (ML) INJECTION
Status: DISCONTINUED | OUTPATIENT
Start: 2018-01-01 | End: 2018-01-01

## 2018-01-01 RX ORDER — HEPARIN SODIUM 1000 [USP'U]/ML
INJECTION, SOLUTION INTRAVENOUS; SUBCUTANEOUS AS NEEDED
Status: DISCONTINUED | OUTPATIENT
Start: 2018-01-01 | End: 2018-01-01 | Stop reason: SURG

## 2018-01-01 RX ORDER — PANTOPRAZOLE SODIUM 40 MG/10ML
40 INJECTION, POWDER, LYOPHILIZED, FOR SOLUTION INTRAVENOUS
Status: DISCONTINUED | OUTPATIENT
Start: 2018-01-01 | End: 2018-01-01

## 2018-01-01 RX ORDER — ATORVASTATIN CALCIUM 80 MG/1
80 TABLET, FILM COATED ORAL NIGHTLY
Qty: 30 TABLET | Refills: 6 | Status: SHIPPED | OUTPATIENT
Start: 2018-01-01

## 2018-01-01 RX ORDER — SODIUM CHLORIDE 9 MG/ML
INJECTION, SOLUTION INTRAVENOUS CONTINUOUS PRN
Status: COMPLETED | OUTPATIENT
Start: 2018-01-01 | End: 2018-01-01

## 2018-01-01 RX ORDER — NICOTINE 21 MG/24HR
1 PATCH, TRANSDERMAL 24 HOURS TRANSDERMAL EVERY 24 HOURS
Status: DISCONTINUED | OUTPATIENT
Start: 2018-01-01 | End: 2018-01-01 | Stop reason: HOSPADM

## 2018-01-01 RX ORDER — SODIUM CHLORIDE 9 MG/ML
INJECTION, SOLUTION INTRAVENOUS
Status: COMPLETED
Start: 2018-01-01 | End: 2018-01-01

## 2018-01-01 RX ORDER — MORPHINE SULFATE 4 MG/ML
4 INJECTION, SOLUTION INTRAMUSCULAR; INTRAVENOUS
Status: DISCONTINUED | OUTPATIENT
Start: 2018-01-01 | End: 2018-01-01

## 2018-01-01 RX ORDER — OXYCODONE HYDROCHLORIDE AND ACETAMINOPHEN 5; 325 MG/1; MG/1
2 TABLET ORAL EVERY 4 HOURS PRN
Status: DISCONTINUED | OUTPATIENT
Start: 2018-01-01 | End: 2018-01-01

## 2018-01-01 RX ORDER — HYDROMORPHONE HCL 110MG/55ML
2 PATIENT CONTROLLED ANALGESIA SYRINGE INTRAVENOUS EVERY 4 HOURS PRN
Status: DISCONTINUED | OUTPATIENT
Start: 2018-01-01 | End: 2018-01-01

## 2018-01-01 RX ORDER — DIAZEPAM 5 MG/1
10 TABLET ORAL NIGHTLY
Status: DISCONTINUED | OUTPATIENT
Start: 2018-01-01 | End: 2018-01-01 | Stop reason: HOSPADM

## 2018-01-01 RX ORDER — DOPAMINE HYDROCHLORIDE 160 MG/100ML
2-20 INJECTION, SOLUTION INTRAVENOUS CONTINUOUS PRN
Status: DISCONTINUED | OUTPATIENT
Start: 2018-01-01 | End: 2018-01-01

## 2018-01-01 RX ORDER — HYDROMORPHONE HCL 110MG/55ML
PATIENT CONTROLLED ANALGESIA SYRINGE INTRAVENOUS
Status: DISCONTINUED
Start: 2018-01-01 | End: 2018-01-01 | Stop reason: HOSPADM

## 2018-01-01 RX ORDER — ICOSAPENT ETHYL 1000 MG/1
2 CAPSULE ORAL 2 TIMES DAILY WITH MEALS
COMMUNITY

## 2018-01-01 RX ORDER — PROMETHAZINE HYDROCHLORIDE 25 MG/ML
INJECTION, SOLUTION INTRAMUSCULAR; INTRAVENOUS
Status: DISPENSED
Start: 2018-01-01 | End: 2018-12-25

## 2018-01-01 RX ORDER — CITALOPRAM 40 MG/1
40 TABLET ORAL DAILY
Status: DISCONTINUED | OUTPATIENT
Start: 2018-01-01 | End: 2018-01-01 | Stop reason: HOSPADM

## 2018-01-01 RX ORDER — ACETAMINOPHEN 325 MG/1
650 TABLET ORAL EVERY 4 HOURS PRN
Status: DISCONTINUED | OUTPATIENT
Start: 2018-01-01 | End: 2018-01-01 | Stop reason: SDUPTHER

## 2018-01-01 RX ORDER — NITROGLYCERIN 5 MG/ML
INJECTION, SOLUTION INTRAVENOUS AS NEEDED
Status: DISCONTINUED | OUTPATIENT
Start: 2018-01-01 | End: 2018-01-01 | Stop reason: HOSPADM

## 2018-01-01 RX ORDER — HYDROCODONE BITARTRATE AND ACETAMINOPHEN 10; 325 MG/1; MG/1
1 TABLET ORAL ONCE
Status: COMPLETED | OUTPATIENT
Start: 2018-01-01 | End: 2018-01-01

## 2018-01-01 RX ORDER — SENNA AND DOCUSATE SODIUM 50; 8.6 MG/1; MG/1
2 TABLET, FILM COATED ORAL NIGHTLY
Status: DISCONTINUED | OUTPATIENT
Start: 2018-01-01 | End: 2018-01-01

## 2018-01-01 RX ORDER — ASPIRIN 325 MG
650 TABLET ORAL ONCE
Status: DISCONTINUED | OUTPATIENT
Start: 2018-01-01 | End: 2018-01-01

## 2018-01-01 RX ORDER — POTASSIUM CHLORIDE, DEXTROSE MONOHYDRATE 150; 5 MG/100ML; G/100ML
30 INJECTION, SOLUTION INTRAVENOUS CONTINUOUS
Status: DISCONTINUED | OUTPATIENT
Start: 2018-01-01 | End: 2018-01-01

## 2018-01-01 RX ORDER — POTASSIUM CHLORIDE 1.5 G/1.77G
40 POWDER, FOR SOLUTION ORAL AS NEEDED
Status: DISCONTINUED | OUTPATIENT
Start: 2018-01-01 | End: 2018-01-01

## 2018-01-01 RX ORDER — DOCUSATE SODIUM 100 MG/1
100 CAPSULE, LIQUID FILLED ORAL 2 TIMES DAILY PRN
Status: DISCONTINUED | OUTPATIENT
Start: 2018-01-01 | End: 2018-01-01 | Stop reason: HOSPADM

## 2018-01-01 RX ORDER — NITROGLYCERIN 0.4 MG/1
0.4 TABLET SUBLINGUAL
COMMUNITY

## 2018-01-01 RX ORDER — PANTOPRAZOLE SODIUM 40 MG/1
40 TABLET, DELAYED RELEASE ORAL
Status: DISCONTINUED | OUTPATIENT
Start: 2018-01-01 | End: 2018-01-01 | Stop reason: HOSPADM

## 2018-01-01 RX ORDER — ALBUTEROL SULFATE 2.5 MG/3ML
2.5 SOLUTION RESPIRATORY (INHALATION) EVERY 4 HOURS PRN
Status: DISCONTINUED | OUTPATIENT
Start: 2018-01-01 | End: 2018-01-01

## 2018-01-01 RX ORDER — ISOSORBIDE MONONITRATE 60 MG/1
60 TABLET, EXTENDED RELEASE ORAL DAILY
Status: DISCONTINUED | OUTPATIENT
Start: 2018-01-01 | End: 2018-01-01

## 2018-01-01 RX ORDER — CARVEDILOL 6.25 MG/1
6.25 TABLET ORAL ONCE
Status: COMPLETED | OUTPATIENT
Start: 2018-01-01 | End: 2018-01-01

## 2018-01-01 RX ORDER — DIAZEPAM 5 MG/1
10 TABLET ORAL NIGHTLY
Status: DISCONTINUED | OUTPATIENT
Start: 2018-01-01 | End: 2018-01-01 | Stop reason: SDUPTHER

## 2018-01-01 RX ORDER — HEPARIN SODIUM 5000 [USP'U]/ML
20 INJECTION, SOLUTION INTRAVENOUS; SUBCUTANEOUS AS NEEDED
Status: DISCONTINUED | OUTPATIENT
Start: 2018-01-01 | End: 2018-01-01 | Stop reason: HOSPADM

## 2018-01-01 RX ADMIN — Medication 2 MG: at 02:33

## 2018-01-01 RX ADMIN — MORPHINE SULFATE 45 MG: 30 TABLET, EXTENDED RELEASE ORAL at 20:16

## 2018-01-01 RX ADMIN — SENNOSIDES AND DOCUSATE SODIUM 2 TABLET: 8.6; 5 TABLET ORAL at 20:16

## 2018-01-01 RX ADMIN — MORPHINE SULFATE 45 MG: 30 TABLET, EXTENDED RELEASE ORAL at 08:58

## 2018-01-01 RX ADMIN — BUPROPION HYDROCHLORIDE 300 MG: 150 TABLET, FILM COATED, EXTENDED RELEASE ORAL at 09:26

## 2018-01-01 RX ADMIN — HEPARIN SODIUM 12.5 UNITS/KG/HR: 10000 INJECTION, SOLUTION INTRAVENOUS at 13:36

## 2018-01-01 RX ADMIN — HYDROCODONE BITARTRATE AND ACETAMINOPHEN 1 TABLET: 7.5; 325 TABLET ORAL at 02:07

## 2018-01-01 RX ADMIN — FEBUXOSTAT 40 MG: 40 TABLET ORAL at 08:37

## 2018-01-01 RX ADMIN — MORPHINE SULFATE 45 MG: 30 TABLET, EXTENDED RELEASE ORAL at 08:37

## 2018-01-01 RX ADMIN — FEBUXOSTAT 40 MG: 40 TABLET ORAL at 08:18

## 2018-01-01 RX ADMIN — OXYCODONE HYDROCHLORIDE AND ACETAMINOPHEN 1 TABLET: 10; 325 TABLET ORAL at 06:09

## 2018-01-01 RX ADMIN — PANTOPRAZOLE SODIUM 40 MG: 40 TABLET, DELAYED RELEASE ORAL at 05:46

## 2018-01-01 RX ADMIN — HYDROMORPHONE HYDROCHLORIDE 1 MG: 2 INJECTION INTRAMUSCULAR; INTRAVENOUS; SUBCUTANEOUS at 17:13

## 2018-01-01 RX ADMIN — HYDROMORPHONE HYDROCHLORIDE 2 MG: 2 INJECTION, SOLUTION INTRAMUSCULAR; INTRAVENOUS; SUBCUTANEOUS at 00:56

## 2018-01-01 RX ADMIN — HYDROMORPHONE HYDROCHLORIDE 2 MG: 2 INJECTION, SOLUTION INTRAMUSCULAR; INTRAVENOUS; SUBCUTANEOUS at 01:47

## 2018-01-01 RX ADMIN — HYDROMORPHONE HYDROCHLORIDE 2 MG: 2 INJECTION, SOLUTION INTRAMUSCULAR; INTRAVENOUS; SUBCUTANEOUS at 03:58

## 2018-01-01 RX ADMIN — PROMETHAZINE HYDROCHLORIDE 12.5 MG: 25 INJECTION INTRAMUSCULAR; INTRAVENOUS at 14:01

## 2018-01-01 RX ADMIN — ALBUTEROL SULFATE 2.5 MG: 2.5 SOLUTION RESPIRATORY (INHALATION) at 16:08

## 2018-01-01 RX ADMIN — PANTOPRAZOLE SODIUM 40 MG: 40 TABLET, DELAYED RELEASE ORAL at 06:45

## 2018-01-01 RX ADMIN — MORPHINE SULFATE 30 MG: 30 TABLET, EXTENDED RELEASE ORAL at 20:46

## 2018-01-01 RX ADMIN — MORPHINE SULFATE 2 MG: 2 INJECTION, SOLUTION INTRAMUSCULAR; INTRAVENOUS at 09:16

## 2018-01-01 RX ADMIN — Medication 2 MG: at 13:23

## 2018-01-01 RX ADMIN — MORPHINE SULFATE 30 MG: 30 TABLET, EXTENDED RELEASE ORAL at 09:18

## 2018-01-01 RX ADMIN — MEPERIDINE HYDROCHLORIDE 12.5 MG: 25 INJECTION, SOLUTION INTRAMUSCULAR; INTRAVENOUS; SUBCUTANEOUS at 23:16

## 2018-01-01 RX ADMIN — MORPHINE SULFATE 2 MG: 2 INJECTION, SOLUTION INTRAMUSCULAR; INTRAVENOUS at 13:18

## 2018-01-01 RX ADMIN — HYDROCODONE BITARTRATE AND ACETAMINOPHEN 1 TABLET: 7.5; 325 TABLET ORAL at 13:09

## 2018-01-01 RX ADMIN — ISOSORBIDE MONONITRATE 30 MG: 30 TABLET, EXTENDED RELEASE ORAL at 10:39

## 2018-01-01 RX ADMIN — OXYCODONE HYDROCHLORIDE AND ACETAMINOPHEN 1 TABLET: 10; 325 TABLET ORAL at 12:26

## 2018-01-01 RX ADMIN — NITROGLYCERIN 5 MCG/MIN: 20 INJECTION INTRAVENOUS at 01:47

## 2018-01-01 RX ADMIN — CITALOPRAM HYDROBROMIDE 40 MG: 40 TABLET ORAL at 08:56

## 2018-01-01 RX ADMIN — CEFUROXIME 1.5 G: 1.5 INJECTION, POWDER, FOR SOLUTION INTRAVENOUS at 11:05

## 2018-01-01 RX ADMIN — METOPROLOL TARTRATE 12.5 MG: 25 TABLET, FILM COATED ORAL at 20:22

## 2018-01-01 RX ADMIN — CEFUROXIME 1.5 G: 1.5 INJECTION, POWDER, FOR SOLUTION INTRAVENOUS at 02:00

## 2018-01-01 RX ADMIN — CITALOPRAM HYDROBROMIDE 40 MG: 40 TABLET ORAL at 08:50

## 2018-01-01 RX ADMIN — PANTOPRAZOLE SODIUM 40 MG: 40 TABLET, DELAYED RELEASE ORAL at 09:18

## 2018-01-01 RX ADMIN — SODIUM CHLORIDE 2 UNITS/HR: 9 INJECTION, SOLUTION INTRAVENOUS at 15:07

## 2018-01-01 RX ADMIN — FEBUXOSTAT 40 MG: 40 TABLET ORAL at 08:10

## 2018-01-01 RX ADMIN — FEBUXOSTAT 40 MG: 40 TABLET ORAL at 08:13

## 2018-01-01 RX ADMIN — ISOSORBIDE MONONITRATE 60 MG: 60 TABLET, EXTENDED RELEASE ORAL at 09:18

## 2018-01-01 RX ADMIN — ATORVASTATIN CALCIUM 80 MG: 40 TABLET, FILM COATED ORAL at 20:09

## 2018-01-01 RX ADMIN — LEVOTHYROXINE SODIUM 50 MCG: 50 TABLET ORAL at 05:15

## 2018-01-01 RX ADMIN — NICOTINE 1 PATCH: 21 PATCH, EXTENDED RELEASE TRANSDERMAL at 12:35

## 2018-01-01 RX ADMIN — ASPIRIN 81 MG: 81 TABLET, COATED ORAL at 08:50

## 2018-01-01 RX ADMIN — BUPROPION HYDROCHLORIDE 300 MG: 150 TABLET, FILM COATED, EXTENDED RELEASE ORAL at 08:29

## 2018-01-01 RX ADMIN — MORPHINE SULFATE 30 MG: 30 TABLET, EXTENDED RELEASE ORAL at 08:33

## 2018-01-01 RX ADMIN — CHLORHEXIDINE GLUCONATE 15 ML: 1.2 RINSE ORAL at 06:18

## 2018-01-01 RX ADMIN — CITALOPRAM HYDROBROMIDE 40 MG: 40 TABLET ORAL at 08:10

## 2018-01-01 RX ADMIN — HYDROMORPHONE HYDROCHLORIDE 2 MG: 2 INJECTION, SOLUTION INTRAMUSCULAR; INTRAVENOUS; SUBCUTANEOUS at 07:45

## 2018-01-01 RX ADMIN — ONDANSETRON 4 MG: 2 INJECTION, SOLUTION INTRAMUSCULAR; INTRAVENOUS at 14:59

## 2018-01-01 RX ADMIN — CALCIUM CHLORIDE 1 G: 100 INJECTION INTRAVENOUS; INTRAVENTRICULAR at 10:32

## 2018-01-01 RX ADMIN — FEBUXOSTAT 40 MG: 40 TABLET ORAL at 08:51

## 2018-01-01 RX ADMIN — FEBUXOSTAT 40 MG: 40 TABLET ORAL at 09:26

## 2018-01-01 RX ADMIN — CEFUROXIME 1.5 G: 1.5 INJECTION, POWDER, FOR SOLUTION INTRAVENOUS at 17:54

## 2018-01-01 RX ADMIN — LEVOTHYROXINE SODIUM 50 MCG: 50 TABLET ORAL at 05:18

## 2018-01-01 RX ADMIN — ASPIRIN 325 MG: 325 TABLET, COATED ORAL at 09:25

## 2018-01-01 RX ADMIN — SENNOSIDES AND DOCUSATE SODIUM 2 TABLET: 8.6; 5 TABLET ORAL at 08:59

## 2018-01-01 RX ADMIN — FENTANYL CITRATE 25 MCG: 50 INJECTION, SOLUTION INTRAMUSCULAR; INTRAVENOUS at 06:45

## 2018-01-01 RX ADMIN — PROPOFOL 50 MCG/KG/MIN: 10 INJECTION, EMULSION INTRAVENOUS at 13:07

## 2018-01-01 RX ADMIN — SENNOSIDES AND DOCUSATE SODIUM 2 TABLET: 8.6; 5 TABLET ORAL at 08:43

## 2018-01-01 RX ADMIN — SUFENTANIL CITRATE 50 MCG: 50 INJECTION, SOLUTION EPIDURAL; INTRAVENOUS at 07:16

## 2018-01-01 RX ADMIN — HEPARIN SODIUM 9 UNITS/KG/HR: 10000 INJECTION, SOLUTION INTRAVENOUS at 08:55

## 2018-01-01 RX ADMIN — POLYETHYLENE GLYCOL (3350) 17 G: 17 POWDER, FOR SOLUTION ORAL at 08:11

## 2018-01-01 RX ADMIN — CARVEDILOL 6.25 MG: 6.25 TABLET, FILM COATED ORAL at 20:28

## 2018-01-01 RX ADMIN — AMINOCAPROIC ACID 10 G: 250 INJECTION, SOLUTION INTRAVENOUS at 10:40

## 2018-01-01 RX ADMIN — HEPARIN SODIUM 5000 UNITS: 5000 INJECTION, SOLUTION INTRAVENOUS; SUBCUTANEOUS at 20:39

## 2018-01-01 RX ADMIN — HYDROMORPHONE HYDROCHLORIDE 2 MG: 2 INJECTION, SOLUTION INTRAMUSCULAR; INTRAVENOUS; SUBCUTANEOUS at 00:34

## 2018-01-01 RX ADMIN — MIDAZOLAM HYDROCHLORIDE 3 MG: 1 INJECTION, SOLUTION INTRAMUSCULAR; INTRAVENOUS at 07:16

## 2018-01-01 RX ADMIN — MORPHINE SULFATE 30 MG: 30 TABLET, EXTENDED RELEASE ORAL at 13:46

## 2018-01-01 RX ADMIN — ROSUVASTATIN CALCIUM 40 MG: 20 TABLET, FILM COATED ORAL at 21:05

## 2018-01-01 RX ADMIN — HEPARIN SODIUM 11 UNITS/KG/HR: 10000 INJECTION, SOLUTION INTRAVENOUS at 21:08

## 2018-01-01 RX ADMIN — ALBUTEROL SULFATE 2.5 MG: 2.5 SOLUTION RESPIRATORY (INHALATION) at 11:56

## 2018-01-01 RX ADMIN — AMINOCAPROIC ACID 10 G: 250 INJECTION, SOLUTION INTRAVENOUS at 08:00

## 2018-01-01 RX ADMIN — CARVEDILOL 6.25 MG: 6.25 TABLET, FILM COATED ORAL at 09:21

## 2018-01-01 RX ADMIN — HYDROMORPHONE HYDROCHLORIDE 2 MG: 2 INJECTION, SOLUTION INTRAMUSCULAR; INTRAVENOUS; SUBCUTANEOUS at 12:22

## 2018-01-01 RX ADMIN — METOPROLOL TARTRATE 12.5 MG: 25 TABLET, FILM COATED ORAL at 05:48

## 2018-01-01 RX ADMIN — HEPARIN SODIUM 7500 UNITS: 1000 INJECTION, SOLUTION INTRAVENOUS; SUBCUTANEOUS at 06:42

## 2018-01-01 RX ADMIN — CHLORHEXIDINE GLUCONATE 15 ML: 1.2 RINSE ORAL at 20:31

## 2018-01-01 RX ADMIN — BUPROPION HYDROCHLORIDE 300 MG: 150 TABLET, FILM COATED, EXTENDED RELEASE ORAL at 08:33

## 2018-01-01 RX ADMIN — MORPHINE SULFATE 2 MG: 2 INJECTION, SOLUTION INTRAMUSCULAR; INTRAVENOUS at 21:04

## 2018-01-01 RX ADMIN — HEPARIN SODIUM 4000 UNITS: 1000 INJECTION, SOLUTION INTRAVENOUS; SUBCUTANEOUS at 02:12

## 2018-01-01 RX ADMIN — HYDROMORPHONE HYDROCHLORIDE 2 MG: 2 INJECTION, SOLUTION INTRAMUSCULAR; INTRAVENOUS; SUBCUTANEOUS at 18:28

## 2018-01-01 RX ADMIN — ASPIRIN 325 MG: 325 TABLET, DELAYED RELEASE ORAL at 08:59

## 2018-01-01 RX ADMIN — CHLORHEXIDINE GLUCONATE 15 ML: 1.2 RINSE ORAL at 21:31

## 2018-01-01 RX ADMIN — HEPARIN SODIUM 5000 UNITS: 5000 INJECTION, SOLUTION INTRAVENOUS; SUBCUTANEOUS at 14:57

## 2018-01-01 RX ADMIN — SODIUM CHLORIDE 1000 ML: 9 INJECTION, SOLUTION INTRAVENOUS at 13:52

## 2018-01-01 RX ADMIN — TICAGRELOR 90 MG: 90 TABLET ORAL at 09:18

## 2018-01-01 RX ADMIN — CEFUROXIME 1.5 G: 1.5 INJECTION, POWDER, FOR SOLUTION INTRAVENOUS at 17:26

## 2018-01-01 RX ADMIN — FAMOTIDINE 20 MG: 20 TABLET ORAL at 06:18

## 2018-01-01 RX ADMIN — SENNOSIDES AND DOCUSATE SODIUM 2 TABLET: 8.6; 5 TABLET ORAL at 21:04

## 2018-01-01 RX ADMIN — SULFUR HEXAFLUORIDE 5 ML: KIT at 15:20

## 2018-01-01 RX ADMIN — ATORVASTATIN CALCIUM 80 MG: 40 TABLET, FILM COATED ORAL at 20:17

## 2018-01-01 RX ADMIN — LEVOTHYROXINE SODIUM 50 MCG: 50 TABLET ORAL at 06:45

## 2018-01-01 RX ADMIN — PANTOPRAZOLE SODIUM 40 MG: 40 TABLET, DELAYED RELEASE ORAL at 05:10

## 2018-01-01 RX ADMIN — ALBUTEROL SULFATE 2.5 MG: 2.5 SOLUTION RESPIRATORY (INHALATION) at 19:30

## 2018-01-01 RX ADMIN — SODIUM BICARBONATE 50 MEQ: 84 INJECTION INTRAVENOUS at 18:03

## 2018-01-01 RX ADMIN — LEVOTHYROXINE SODIUM 50 MCG: 50 TABLET ORAL at 05:23

## 2018-01-01 RX ADMIN — FEBUXOSTAT 40 MG: 40 TABLET ORAL at 08:43

## 2018-01-01 RX ADMIN — ALBUTEROL SULFATE 2.5 MG: 2.5 SOLUTION RESPIRATORY (INHALATION) at 08:20

## 2018-01-01 RX ADMIN — MORPHINE SULFATE 30 MG: 30 TABLET, EXTENDED RELEASE ORAL at 08:50

## 2018-01-01 RX ADMIN — METOPROLOL TARTRATE 12.5 MG: 25 TABLET, FILM COATED ORAL at 08:11

## 2018-01-01 RX ADMIN — PANTOPRAZOLE SODIUM 40 MG: 40 TABLET, DELAYED RELEASE ORAL at 05:23

## 2018-01-01 RX ADMIN — MORPHINE SULFATE 30 MG: 30 TABLET, EXTENDED RELEASE ORAL at 20:18

## 2018-01-01 RX ADMIN — PANTOPRAZOLE SODIUM 40 MG: 40 TABLET, DELAYED RELEASE ORAL at 06:10

## 2018-01-01 RX ADMIN — POLYETHYLENE GLYCOL (3350) 17 G: 17 POWDER, FOR SOLUTION ORAL at 17:06

## 2018-01-01 RX ADMIN — HYDROCODONE BITARTRATE AND ACETAMINOPHEN 1 TABLET: 7.5; 325 TABLET ORAL at 10:07

## 2018-01-01 RX ADMIN — MORPHINE SULFATE 2 MG: 2 INJECTION, SOLUTION INTRAMUSCULAR; INTRAVENOUS at 01:26

## 2018-01-01 RX ADMIN — HYDROCODONE BITARTRATE AND ACETAMINOPHEN 1 TABLET: 10; 325 TABLET ORAL at 19:38

## 2018-01-01 RX ADMIN — BUPROPION HYDROCHLORIDE 300 MG: 150 TABLET, FILM COATED, EXTENDED RELEASE ORAL at 08:44

## 2018-01-01 RX ADMIN — RANOLAZINE 500 MG: 500 TABLET, FILM COATED, EXTENDED RELEASE ORAL at 09:21

## 2018-01-01 RX ADMIN — TICAGRELOR 90 MG: 90 TABLET ORAL at 21:16

## 2018-01-01 RX ADMIN — ATORVASTATIN CALCIUM 80 MG: 40 TABLET, FILM COATED ORAL at 22:12

## 2018-01-01 RX ADMIN — Medication 2 MG: at 23:39

## 2018-01-01 RX ADMIN — HEPARIN SODIUM 5000 UNITS: 5000 INJECTION, SOLUTION INTRAVENOUS; SUBCUTANEOUS at 20:56

## 2018-01-01 RX ADMIN — Medication 10 ML: at 20:17

## 2018-01-01 RX ADMIN — SENNOSIDES AND DOCUSATE SODIUM 2 TABLET: 8.6; 5 TABLET ORAL at 08:09

## 2018-01-01 RX ADMIN — HYDROMORPHONE HYDROCHLORIDE 2 MG: 2 INJECTION, SOLUTION INTRAMUSCULAR; INTRAVENOUS; SUBCUTANEOUS at 13:53

## 2018-01-01 RX ADMIN — NOREPINEPHRINE BITARTRATE 0.04 MCG/KG/MIN: 8 SOLUTION at 03:55

## 2018-01-01 RX ADMIN — WATER 3 G: 1 INJECTION INTRAMUSCULAR; INTRAVENOUS; SUBCUTANEOUS at 08:00

## 2018-01-01 RX ADMIN — ROSUVASTATIN CALCIUM 40 MG: 20 TABLET, FILM COATED ORAL at 20:18

## 2018-01-01 RX ADMIN — MORPHINE SULFATE 30 MG: 30 TABLET, EXTENDED RELEASE ORAL at 22:12

## 2018-01-01 RX ADMIN — NICOTINE 1 PATCH: 21 PATCH, EXTENDED RELEASE TRANSDERMAL at 10:32

## 2018-01-01 RX ADMIN — BUPROPION HYDROCHLORIDE 300 MG: 150 TABLET, FILM COATED, EXTENDED RELEASE ORAL at 08:50

## 2018-01-01 RX ADMIN — NOREPINEPHRINE BITARTRATE 0.07 MCG/KG/MIN: 8 SOLUTION at 06:02

## 2018-01-01 RX ADMIN — SUFENTANIL CITRATE 50 MCG: 50 INJECTION, SOLUTION EPIDURAL; INTRAVENOUS at 10:15

## 2018-01-01 RX ADMIN — MORPHINE SULFATE 45 MG: 30 TABLET, EXTENDED RELEASE ORAL at 08:18

## 2018-01-01 RX ADMIN — MORPHINE SULFATE 30 MG: 30 TABLET, EXTENDED RELEASE ORAL at 20:10

## 2018-01-01 RX ADMIN — METOPROLOL TARTRATE 12.5 MG: 25 TABLET, FILM COATED ORAL at 20:16

## 2018-01-01 RX ADMIN — RANOLAZINE 500 MG: 500 TABLET, FILM COATED, EXTENDED RELEASE ORAL at 08:25

## 2018-01-01 RX ADMIN — METOPROLOL TARTRATE 12.5 MG: 25 TABLET, FILM COATED ORAL at 20:59

## 2018-01-01 RX ADMIN — Medication 10 ML: at 17:05

## 2018-01-01 RX ADMIN — DIAZEPAM 10 MG: 5 TABLET ORAL at 23:03

## 2018-01-01 RX ADMIN — LEVOTHYROXINE SODIUM 50 MCG: 50 TABLET ORAL at 06:05

## 2018-01-01 RX ADMIN — FEBUXOSTAT 40 MG: 40 TABLET ORAL at 08:33

## 2018-01-01 RX ADMIN — HYDROMORPHONE HYDROCHLORIDE 2 MG: 2 INJECTION, SOLUTION INTRAMUSCULAR; INTRAVENOUS; SUBCUTANEOUS at 17:36

## 2018-01-01 RX ADMIN — HYDROMORPHONE HYDROCHLORIDE 2 MG: 2 INJECTION, SOLUTION INTRAMUSCULAR; INTRAVENOUS; SUBCUTANEOUS at 02:58

## 2018-01-01 RX ADMIN — NITROGLYCERIN 1 INCH: 20 OINTMENT TOPICAL at 17:15

## 2018-01-01 RX ADMIN — EPTIFIBATIDE 2 MCG/KG/MIN: 0.75 INJECTION, SOLUTION INTRAVENOUS at 12:22

## 2018-01-01 RX ADMIN — DIAZEPAM 10 MG: 5 TABLET ORAL at 21:04

## 2018-01-01 RX ADMIN — MORPHINE SULFATE 2 MG: 2 INJECTION, SOLUTION INTRAMUSCULAR; INTRAVENOUS at 11:36

## 2018-01-01 RX ADMIN — MIDAZOLAM HYDROCHLORIDE 7 MG: 1 INJECTION, SOLUTION INTRAMUSCULAR; INTRAVENOUS at 10:15

## 2018-01-01 RX ADMIN — METOPROLOL TARTRATE 25 MG: 25 TABLET ORAL at 20:52

## 2018-01-01 RX ADMIN — MORPHINE SULFATE 30 MG: 30 TABLET, EXTENDED RELEASE ORAL at 08:44

## 2018-01-01 RX ADMIN — HEPARIN SODIUM 5000 UNITS: 1000 INJECTION, SOLUTION INTRAVENOUS; SUBCUTANEOUS at 08:42

## 2018-01-01 RX ADMIN — MORPHINE SULFATE 30 MG: 30 TABLET, EXTENDED RELEASE ORAL at 09:42

## 2018-01-01 RX ADMIN — OXYCODONE HYDROCHLORIDE AND ACETAMINOPHEN 2 TABLET: 5; 325 TABLET ORAL at 16:17

## 2018-01-01 RX ADMIN — CITALOPRAM HYDROBROMIDE 40 MG: 40 TABLET ORAL at 08:33

## 2018-01-01 RX ADMIN — ATORVASTATIN CALCIUM 80 MG: 40 TABLET, FILM COATED ORAL at 22:24

## 2018-01-01 RX ADMIN — HEPARIN SODIUM 5000 UNITS: 5000 INJECTION, SOLUTION INTRAVENOUS; SUBCUTANEOUS at 15:26

## 2018-01-01 RX ADMIN — HEPARIN SODIUM 12 UNITS/KG/HR: 10000 INJECTION, SOLUTION INTRAVENOUS at 06:06

## 2018-01-01 RX ADMIN — HEPARIN SODIUM 7.8 UNITS/KG/HR: 10000 INJECTION, SOLUTION INTRAVENOUS at 02:55

## 2018-01-01 RX ADMIN — HYDROMORPHONE HYDROCHLORIDE 2 MG: 2 INJECTION, SOLUTION INTRAMUSCULAR; INTRAVENOUS; SUBCUTANEOUS at 05:18

## 2018-01-01 RX ADMIN — Medication 10 ML: at 05:23

## 2018-01-01 RX ADMIN — FEBUXOSTAT 40 MG: 40 TABLET ORAL at 08:23

## 2018-01-01 RX ADMIN — ATORVASTATIN CALCIUM 80 MG: 40 TABLET, FILM COATED ORAL at 21:05

## 2018-01-01 RX ADMIN — Medication 2 MG: at 19:40

## 2018-01-01 RX ADMIN — CEFUROXIME 1.5 G: 1.5 INJECTION, POWDER, FOR SOLUTION INTRAVENOUS at 02:07

## 2018-01-01 RX ADMIN — CHLORHEXIDINE GLUCONATE 15 ML: 1.2 RINSE ORAL at 08:43

## 2018-01-01 RX ADMIN — CITALOPRAM HYDROBROMIDE 40 MG: 40 TABLET ORAL at 08:18

## 2018-01-01 RX ADMIN — CITALOPRAM HYDROBROMIDE 40 MG: 40 TABLET ORAL at 12:21

## 2018-01-01 RX ADMIN — OXYCODONE HYDROCHLORIDE AND ACETAMINOPHEN 1 TABLET: 10; 325 TABLET ORAL at 00:18

## 2018-01-01 RX ADMIN — ATORVASTATIN CALCIUM 80 MG: 40 TABLET, FILM COATED ORAL at 20:19

## 2018-01-01 RX ADMIN — HEPARIN SODIUM 9 UNITS/KG/HR: 10000 INJECTION, SOLUTION INTRAVENOUS at 10:02

## 2018-01-01 RX ADMIN — TRAZODONE HYDROCHLORIDE 50 MG: 50 TABLET ORAL at 21:04

## 2018-01-01 RX ADMIN — HYDROCODONE BITARTRATE AND ACETAMINOPHEN 1 TABLET: 7.5; 325 TABLET ORAL at 00:12

## 2018-01-01 RX ADMIN — SENNOSIDES AND DOCUSATE SODIUM 2 TABLET: 8.6; 5 TABLET ORAL at 20:58

## 2018-01-01 RX ADMIN — POLYETHYLENE GLYCOL (3350) 17 G: 17 POWDER, FOR SOLUTION ORAL at 08:43

## 2018-01-01 RX ADMIN — ATORVASTATIN CALCIUM 80 MG: 40 TABLET, FILM COATED ORAL at 20:58

## 2018-01-01 RX ADMIN — BUPROPION HYDROCHLORIDE 300 MG: 150 TABLET, FILM COATED, EXTENDED RELEASE ORAL at 08:56

## 2018-01-01 RX ADMIN — BUPROPION HYDROCHLORIDE 300 MG: 150 TABLET, FILM COATED, EXTENDED RELEASE ORAL at 09:52

## 2018-01-01 RX ADMIN — MORPHINE SULFATE 30 MG: 30 TABLET, EXTENDED RELEASE ORAL at 01:13

## 2018-01-01 RX ADMIN — MORPHINE SULFATE 30 MG: 30 TABLET, EXTENDED RELEASE ORAL at 10:17

## 2018-01-01 RX ADMIN — DIAZEPAM 10 MG: 5 TABLET ORAL at 20:28

## 2018-01-01 RX ADMIN — SENNOSIDES AND DOCUSATE SODIUM 2 TABLET: 8.6; 5 TABLET ORAL at 20:56

## 2018-01-01 RX ADMIN — ATORVASTATIN CALCIUM 80 MG: 40 TABLET, FILM COATED ORAL at 20:31

## 2018-01-01 RX ADMIN — Medication 10 ML: at 12:23

## 2018-01-01 RX ADMIN — SUFENTANIL CITRATE 150 MCG: 50 INJECTION, SOLUTION EPIDURAL; INTRAVENOUS at 08:05

## 2018-01-01 RX ADMIN — TICAGRELOR 90 MG: 90 TABLET ORAL at 01:14

## 2018-01-01 RX ADMIN — FENTANYL CITRATE 25 MCG: 50 INJECTION, SOLUTION INTRAMUSCULAR; INTRAVENOUS at 12:32

## 2018-01-01 RX ADMIN — HYDROMORPHONE HYDROCHLORIDE 2 MG: 2 INJECTION, SOLUTION INTRAMUSCULAR; INTRAVENOUS; SUBCUTANEOUS at 09:41

## 2018-01-01 RX ADMIN — ATORVASTATIN CALCIUM 80 MG: 40 TABLET, FILM COATED ORAL at 20:46

## 2018-01-01 RX ADMIN — HYDROMORPHONE HYDROCHLORIDE 2 MG: 2 INJECTION, SOLUTION INTRAMUSCULAR; INTRAVENOUS; SUBCUTANEOUS at 05:21

## 2018-01-01 RX ADMIN — CITALOPRAM HYDROBROMIDE 40 MG: 40 TABLET ORAL at 08:42

## 2018-01-01 RX ADMIN — Medication 2 MG: at 08:57

## 2018-01-01 RX ADMIN — MORPHINE SULFATE 2 MG: 2 INJECTION, SOLUTION INTRAMUSCULAR; INTRAVENOUS at 12:34

## 2018-01-01 RX ADMIN — ASPIRIN 325 MG: 325 TABLET, DELAYED RELEASE ORAL at 08:38

## 2018-01-01 RX ADMIN — CARVEDILOL 6.25 MG: 6.25 TABLET, FILM COATED ORAL at 21:16

## 2018-01-01 RX ADMIN — ATORVASTATIN CALCIUM 80 MG: 40 TABLET, FILM COATED ORAL at 20:56

## 2018-01-01 RX ADMIN — PANTOPRAZOLE SODIUM 40 MG: 40 TABLET, DELAYED RELEASE ORAL at 06:09

## 2018-01-01 RX ADMIN — OXYCODONE HYDROCHLORIDE AND ACETAMINOPHEN 1 TABLET: 10; 325 TABLET ORAL at 00:54

## 2018-01-01 RX ADMIN — TICAGRELOR 90 MG: 90 TABLET ORAL at 08:50

## 2018-01-01 RX ADMIN — BISACODYL 10 MG: 5 TABLET, COATED ORAL at 03:12

## 2018-01-01 RX ADMIN — LEVOTHYROXINE SODIUM 50 MCG: 50 TABLET ORAL at 05:21

## 2018-01-01 RX ADMIN — NICOTINE 1 PATCH: 21 PATCH, EXTENDED RELEASE TRANSDERMAL at 08:59

## 2018-01-01 RX ADMIN — CARVEDILOL 6.25 MG: 6.25 TABLET, FILM COATED ORAL at 08:38

## 2018-01-01 RX ADMIN — CARVEDILOL 6.25 MG: 6.25 TABLET, FILM COATED ORAL at 08:26

## 2018-01-01 RX ADMIN — METOPROLOL TARTRATE 12.5 MG: 25 TABLET, FILM COATED ORAL at 20:46

## 2018-01-01 RX ADMIN — HYDROMORPHONE HYDROCHLORIDE 2 MG: 2 INJECTION, SOLUTION INTRAMUSCULAR; INTRAVENOUS; SUBCUTANEOUS at 16:22

## 2018-01-01 RX ADMIN — MORPHINE SULFATE 30 MG: 30 TABLET, EXTENDED RELEASE ORAL at 08:41

## 2018-01-01 RX ADMIN — HYDROMORPHONE HYDROCHLORIDE 2 MG: 2 INJECTION, SOLUTION INTRAMUSCULAR; INTRAVENOUS; SUBCUTANEOUS at 08:15

## 2018-01-01 RX ADMIN — HEPARIN SODIUM 5000 UNITS: 5000 INJECTION, SOLUTION INTRAVENOUS; SUBCUTANEOUS at 13:35

## 2018-01-01 RX ADMIN — Medication 2 MG: at 03:00

## 2018-01-01 RX ADMIN — MORPHINE SULFATE 30 MG: 30 TABLET, EXTENDED RELEASE ORAL at 03:48

## 2018-01-01 RX ADMIN — BUPROPION HYDROCHLORIDE 300 MG: 150 TABLET, FILM COATED, EXTENDED RELEASE ORAL at 08:09

## 2018-01-01 RX ADMIN — ASPIRIN 81 MG 81 MG: 81 TABLET ORAL at 08:38

## 2018-01-01 RX ADMIN — DIAZEPAM 10 MG: 5 TABLET ORAL at 21:16

## 2018-01-01 RX ADMIN — HYDROMORPHONE HYDROCHLORIDE 2 MG: 2 INJECTION, SOLUTION INTRAMUSCULAR; INTRAVENOUS; SUBCUTANEOUS at 16:35

## 2018-01-01 RX ADMIN — POLYETHYLENE GLYCOL (3350) 17 G: 17 POWDER, FOR SOLUTION ORAL at 08:38

## 2018-01-01 RX ADMIN — PROTAMINE SULFATE 100 MG: 10 INJECTION, SOLUTION INTRAVENOUS at 12:32

## 2018-01-01 RX ADMIN — BUPROPION HYDROCHLORIDE 300 MG: 150 TABLET, FILM COATED, EXTENDED RELEASE ORAL at 08:11

## 2018-01-01 RX ADMIN — HYDROMORPHONE HYDROCHLORIDE 2 MG: 2 INJECTION, SOLUTION INTRAMUSCULAR; INTRAVENOUS; SUBCUTANEOUS at 12:11

## 2018-01-01 RX ADMIN — OXYCODONE HYDROCHLORIDE AND ACETAMINOPHEN 1 TABLET: 10; 325 TABLET ORAL at 23:53

## 2018-01-01 RX ADMIN — LEVOTHYROXINE SODIUM 50 MCG: 50 TABLET ORAL at 05:04

## 2018-01-01 RX ADMIN — HEPARIN SODIUM 5000 UNITS: 5000 INJECTION, SOLUTION INTRAVENOUS; SUBCUTANEOUS at 05:37

## 2018-01-01 RX ADMIN — MORPHINE SULFATE 45 MG: 30 TABLET, EXTENDED RELEASE ORAL at 08:11

## 2018-01-01 RX ADMIN — Medication 2 MG: at 07:44

## 2018-01-01 RX ADMIN — POLYETHYLENE GLYCOL (3350) 17 G: 17 POWDER, FOR SOLUTION ORAL at 08:10

## 2018-01-01 RX ADMIN — FENTANYL CITRATE 25 MCG: 50 INJECTION, SOLUTION INTRAMUSCULAR; INTRAVENOUS at 20:21

## 2018-01-01 RX ADMIN — METOPROLOL TARTRATE 12.5 MG: 25 TABLET, FILM COATED ORAL at 20:56

## 2018-01-01 RX ADMIN — ASPIRIN 325 MG: 325 TABLET, DELAYED RELEASE ORAL at 08:18

## 2018-01-01 RX ADMIN — Medication 2 MG: at 16:48

## 2018-01-01 RX ADMIN — SENNOSIDES AND DOCUSATE SODIUM 2 TABLET: 8.6; 5 TABLET ORAL at 14:17

## 2018-01-01 RX ADMIN — MORPHINE SULFATE 30 MG: 30 TABLET, EXTENDED RELEASE ORAL at 13:10

## 2018-01-01 RX ADMIN — ASPIRIN 81 MG: 81 TABLET, COATED ORAL at 08:41

## 2018-01-01 RX ADMIN — FEBUXOSTAT 40 MG: 40 TABLET ORAL at 09:52

## 2018-01-01 RX ADMIN — DOCUSATE SODIUM 100 MG: 100 CAPSULE, LIQUID FILLED ORAL at 03:12

## 2018-01-01 RX ADMIN — METOPROLOL TARTRATE 12.5 MG: 25 TABLET, FILM COATED ORAL at 08:38

## 2018-01-01 RX ADMIN — ASPIRIN 81 MG 81 MG: 81 TABLET ORAL at 09:18

## 2018-01-01 RX ADMIN — LEVOTHYROXINE SODIUM 50 MCG: 50 TABLET ORAL at 05:46

## 2018-01-01 RX ADMIN — MORPHINE SULFATE 30 MG: 30 TABLET, EXTENDED RELEASE ORAL at 21:04

## 2018-01-01 RX ADMIN — HEPARIN SODIUM 8 UNITS/KG/HR: 10000 INJECTION, SOLUTION INTRAVENOUS at 20:44

## 2018-01-01 RX ADMIN — OXYCODONE HYDROCHLORIDE AND ACETAMINOPHEN 1 TABLET: 10; 325 TABLET ORAL at 12:22

## 2018-01-01 RX ADMIN — BUPROPION HYDROCHLORIDE 300 MG: 150 TABLET, FILM COATED, EXTENDED RELEASE ORAL at 09:18

## 2018-01-01 RX ADMIN — MORPHINE SULFATE 45 MG: 30 TABLET, EXTENDED RELEASE ORAL at 09:26

## 2018-01-01 RX ADMIN — ONDANSETRON 4 MG: 2 INJECTION, SOLUTION INTRAMUSCULAR; INTRAVENOUS at 16:47

## 2018-01-01 RX ADMIN — PANTOPRAZOLE SODIUM 40 MG: 40 INJECTION, POWDER, FOR SOLUTION INTRAVENOUS at 06:02

## 2018-01-01 RX ADMIN — TICAGRELOR 90 MG: 90 TABLET ORAL at 08:38

## 2018-01-01 RX ADMIN — SENNOSIDES AND DOCUSATE SODIUM 2 TABLET: 8.6; 5 TABLET ORAL at 08:11

## 2018-01-01 RX ADMIN — LEVOTHYROXINE SODIUM 50 MCG: 50 TABLET ORAL at 05:39

## 2018-01-01 RX ADMIN — METOPROLOL TARTRATE 12.5 MG: 25 TABLET, FILM COATED ORAL at 08:23

## 2018-01-01 RX ADMIN — CARVEDILOL 6.25 MG: 6.25 TABLET, FILM COATED ORAL at 09:22

## 2018-01-01 RX ADMIN — ISOSORBIDE MONONITRATE 90 MG: 60 TABLET, EXTENDED RELEASE ORAL at 08:26

## 2018-01-01 RX ADMIN — HYDROCODONE BITARTRATE AND ACETAMINOPHEN 1 TABLET: 7.5; 325 TABLET ORAL at 23:22

## 2018-01-01 RX ADMIN — METOPROLOL TARTRATE 12.5 MG: 25 TABLET, FILM COATED ORAL at 20:11

## 2018-01-01 RX ADMIN — HEPARIN SODIUM 5000 UNITS: 5000 INJECTION, SOLUTION INTRAVENOUS; SUBCUTANEOUS at 05:21

## 2018-01-01 RX ADMIN — HYDROMORPHONE HYDROCHLORIDE 2 MG: 2 INJECTION, SOLUTION INTRAMUSCULAR; INTRAVENOUS; SUBCUTANEOUS at 20:20

## 2018-01-01 RX ADMIN — MORPHINE SULFATE 4 MG: 4 INJECTION INTRAVENOUS at 18:04

## 2018-01-01 RX ADMIN — LEVOTHYROXINE SODIUM 50 MCG: 50 TABLET ORAL at 06:09

## 2018-01-01 RX ADMIN — PANTOPRAZOLE SODIUM 40 MG: 40 TABLET, DELAYED RELEASE ORAL at 05:41

## 2018-01-01 RX ADMIN — MORPHINE SULFATE 45 MG: 30 TABLET, EXTENDED RELEASE ORAL at 20:52

## 2018-01-01 RX ADMIN — KETOTIFEN FUMARATE 1 DROP: 0.35 SOLUTION/ DROPS OPHTHALMIC at 17:19

## 2018-01-01 RX ADMIN — Medication 2 MG: at 13:08

## 2018-01-01 RX ADMIN — MORPHINE SULFATE 2 MG: 2 INJECTION, SOLUTION INTRAMUSCULAR; INTRAVENOUS at 17:21

## 2018-01-01 RX ADMIN — MORPHINE SULFATE 4 MG: 4 INJECTION, SOLUTION INTRAMUSCULAR; INTRAVENOUS at 02:34

## 2018-01-01 RX ADMIN — METOPROLOL TARTRATE 25 MG: 25 TABLET ORAL at 08:59

## 2018-01-01 RX ADMIN — NITROGLYCERIN 10 MCG/MIN: 20 INJECTION INTRAVENOUS at 22:18

## 2018-01-01 RX ADMIN — MORPHINE SULFATE 30 MG: 30 TABLET, EXTENDED RELEASE ORAL at 08:24

## 2018-01-01 RX ADMIN — ASPIRIN 325 MG ORAL TABLET 325 MG: 325 PILL ORAL at 08:15

## 2018-01-01 RX ADMIN — LEVOTHYROXINE SODIUM 50 MCG: 50 TABLET ORAL at 05:10

## 2018-01-01 RX ADMIN — RANOLAZINE 500 MG: 500 TABLET, FILM COATED, EXTENDED RELEASE ORAL at 20:18

## 2018-01-01 RX ADMIN — BUPROPION HYDROCHLORIDE 300 MG: 150 TABLET, FILM COATED, EXTENDED RELEASE ORAL at 08:58

## 2018-01-01 RX ADMIN — BUPROPION HYDROCHLORIDE 300 MG: 150 TABLET, FILM COATED, EXTENDED RELEASE ORAL at 08:37

## 2018-01-01 RX ADMIN — CITALOPRAM HYDROBROMIDE 40 MG: 20 TABLET ORAL at 09:20

## 2018-01-01 RX ADMIN — DOPAMINE HYDROCHLORIDE 5 MCG/KG/MIN: 80 INJECTION, SOLUTION INTRAVENOUS at 10:25

## 2018-01-01 RX ADMIN — CITALOPRAM HYDROBROMIDE 40 MG: 40 TABLET ORAL at 08:11

## 2018-01-01 RX ADMIN — HYDROMORPHONE HYDROCHLORIDE 2 MG: 2 INJECTION, SOLUTION INTRAMUSCULAR; INTRAVENOUS; SUBCUTANEOUS at 15:02

## 2018-01-01 RX ADMIN — ALBUTEROL SULFATE 2.5 MG: 2.5 SOLUTION RESPIRATORY (INHALATION) at 21:05

## 2018-01-01 RX ADMIN — CARVEDILOL 6.25 MG: 6.25 TABLET, FILM COATED ORAL at 08:50

## 2018-01-01 RX ADMIN — CARVEDILOL 6.25 MG: 6.25 TABLET, FILM COATED ORAL at 08:41

## 2018-01-01 RX ADMIN — CARVEDILOL 6.25 MG: 6.25 TABLET, FILM COATED ORAL at 01:14

## 2018-01-01 RX ADMIN — MORPHINE SULFATE 2 MG: 2 INJECTION, SOLUTION INTRAMUSCULAR; INTRAVENOUS at 03:48

## 2018-01-01 RX ADMIN — SENNOSIDES AND DOCUSATE SODIUM 2 TABLET: 8.6; 5 TABLET ORAL at 09:26

## 2018-01-01 RX ADMIN — HEPARIN SODIUM 3000 UNITS: 1000 INJECTION, SOLUTION INTRAVENOUS; SUBCUTANEOUS at 01:19

## 2018-01-01 RX ADMIN — LEVOTHYROXINE SODIUM 50 MCG: 50 TABLET ORAL at 05:37

## 2018-01-01 RX ADMIN — MORPHINE SULFATE 30 MG: 30 TABLET, EXTENDED RELEASE ORAL at 21:16

## 2018-01-01 RX ADMIN — Medication 2 MG: at 23:52

## 2018-01-01 RX ADMIN — METOPROLOL TARTRATE 12.5 MG: 25 TABLET, FILM COATED ORAL at 08:15

## 2018-01-01 RX ADMIN — OXYCODONE HYDROCHLORIDE AND ACETAMINOPHEN 1 TABLET: 10; 325 TABLET ORAL at 17:00

## 2018-01-01 RX ADMIN — HEPARIN SODIUM 3000 UNITS: 1000 INJECTION, SOLUTION INTRAVENOUS; SUBCUTANEOUS at 13:36

## 2018-01-01 RX ADMIN — DIAZEPAM 10 MG: 5 TABLET ORAL at 22:06

## 2018-01-01 RX ADMIN — HEPARIN SODIUM 5000 UNITS: 5000 INJECTION, SOLUTION INTRAVENOUS; SUBCUTANEOUS at 06:18

## 2018-01-01 RX ADMIN — LEVOTHYROXINE SODIUM 50 MCG: 0.05 TABLET ORAL at 06:53

## 2018-01-01 RX ADMIN — Medication 10 ML: at 17:11

## 2018-01-01 RX ADMIN — OXYCODONE HYDROCHLORIDE AND ACETAMINOPHEN 1 TABLET: 10; 325 TABLET ORAL at 04:19

## 2018-01-01 RX ADMIN — BUPROPION HYDROCHLORIDE 300 MG: 150 TABLET, FILM COATED, EXTENDED RELEASE ORAL at 08:34

## 2018-01-01 RX ADMIN — ASPIRIN 325 MG: 325 TABLET, DELAYED RELEASE ORAL at 08:09

## 2018-01-01 RX ADMIN — TICAGRELOR 90 MG: 90 TABLET ORAL at 08:42

## 2018-01-01 RX ADMIN — HYDROMORPHONE HYDROCHLORIDE 2 MG: 2 INJECTION, SOLUTION INTRAMUSCULAR; INTRAVENOUS; SUBCUTANEOUS at 09:44

## 2018-01-01 RX ADMIN — OXYCODONE HYDROCHLORIDE AND ACETAMINOPHEN 2 TABLET: 5; 325 TABLET ORAL at 06:02

## 2018-01-01 RX ADMIN — ETOMIDATE 26.94 MG: 2 INJECTION, SOLUTION INTRAVENOUS at 07:16

## 2018-01-01 RX ADMIN — MORPHINE SULFATE 2 MG: 2 INJECTION, SOLUTION INTRAMUSCULAR; INTRAVENOUS at 16:59

## 2018-01-01 RX ADMIN — MORPHINE SULFATE 30 MG: 30 TABLET, EXTENDED RELEASE ORAL at 20:28

## 2018-01-01 RX ADMIN — RANOLAZINE 500 MG: 500 TABLET, FILM COATED, EXTENDED RELEASE ORAL at 01:14

## 2018-01-01 RX ADMIN — ATORVASTATIN CALCIUM 80 MG: 40 TABLET, FILM COATED ORAL at 20:15

## 2018-01-01 RX ADMIN — NITROGLYCERIN 1 INCH: 20 OINTMENT TOPICAL at 20:51

## 2018-01-01 RX ADMIN — HYDROCODONE BITARTRATE AND ACETAMINOPHEN 1 TABLET: 7.5; 325 TABLET ORAL at 08:10

## 2018-01-01 RX ADMIN — MORPHINE SULFATE 2 MG: 2 INJECTION, SOLUTION INTRAMUSCULAR; INTRAVENOUS at 08:38

## 2018-01-01 RX ADMIN — METOPROLOL TARTRATE 5 MG: 5 INJECTION, SOLUTION INTRAVENOUS at 05:04

## 2018-01-01 RX ADMIN — NITROGLYCERIN 10 MCG/MIN: 20 INJECTION INTRAVENOUS at 03:49

## 2018-01-01 RX ADMIN — OXYCODONE HYDROCHLORIDE AND ACETAMINOPHEN 1 TABLET: 10; 325 TABLET ORAL at 08:20

## 2018-01-01 RX ADMIN — SODIUM CHLORIDE 50 ML/HR: 9 INJECTION, SOLUTION INTRAVENOUS at 15:02

## 2018-01-01 RX ADMIN — POLYETHYLENE GLYCOL (3350) 17 G: 17 POWDER, FOR SOLUTION ORAL at 09:26

## 2018-01-01 RX ADMIN — HEPARIN SODIUM 5000 UNITS: 5000 INJECTION, SOLUTION INTRAVENOUS; SUBCUTANEOUS at 14:23

## 2018-01-01 RX ADMIN — Medication 2 MG: at 11:50

## 2018-01-01 RX ADMIN — MUPIROCIN: 20 OINTMENT TOPICAL at 06:18

## 2018-01-01 RX ADMIN — DIAZEPAM 10 MG: 5 TABLET ORAL at 20:23

## 2018-01-01 RX ADMIN — HYDROMORPHONE HYDROCHLORIDE 2 MG: 2 INJECTION, SOLUTION INTRAMUSCULAR; INTRAVENOUS; SUBCUTANEOUS at 01:12

## 2018-01-01 RX ADMIN — TICAGRELOR 90 MG: 90 TABLET ORAL at 21:04

## 2018-01-01 RX ADMIN — HYDROMORPHONE HYDROCHLORIDE 1 MG: 1 INJECTION, SOLUTION INTRAMUSCULAR; INTRAVENOUS; SUBCUTANEOUS at 03:02

## 2018-01-01 RX ADMIN — SODIUM CHLORIDE 100 ML/HR: 9 INJECTION, SOLUTION INTRAVENOUS at 16:53

## 2018-01-01 RX ADMIN — MORPHINE SULFATE 15 MG: 15 TABLET, EXTENDED RELEASE ORAL at 10:17

## 2018-01-01 RX ADMIN — MEPERIDINE HYDROCHLORIDE 25 MG: 25 INJECTION, SOLUTION INTRAMUSCULAR; INTRAVENOUS; SUBCUTANEOUS at 20:23

## 2018-01-01 RX ADMIN — DIAZEPAM 10 MG: 5 TABLET ORAL at 20:57

## 2018-01-01 RX ADMIN — HEPARIN SODIUM 5000 UNITS: 5000 INJECTION, SOLUTION INTRAVENOUS; SUBCUTANEOUS at 05:04

## 2018-01-01 RX ADMIN — PANTOPRAZOLE SODIUM 40 MG: 40 TABLET, DELAYED RELEASE ORAL at 05:18

## 2018-01-01 RX ADMIN — KETOTIFEN FUMARATE 1 DROP: 0.35 SOLUTION/ DROPS OPHTHALMIC at 20:29

## 2018-01-01 RX ADMIN — ACETAMINOPHEN 650 MG: 325 TABLET, FILM COATED ORAL at 16:49

## 2018-01-01 RX ADMIN — DIAZEPAM 10 MG: 5 TABLET ORAL at 21:54

## 2018-01-01 RX ADMIN — TICAGRELOR 90 MG: 90 TABLET ORAL at 20:29

## 2018-01-01 RX ADMIN — FEBUXOSTAT 40 MG: 40 TABLET ORAL at 08:15

## 2018-01-01 RX ADMIN — SENNOSIDES AND DOCUSATE SODIUM 2 TABLET: 8.6; 5 TABLET ORAL at 20:31

## 2018-01-01 RX ADMIN — CITALOPRAM HYDROBROMIDE 40 MG: 40 TABLET ORAL at 08:38

## 2018-01-01 RX ADMIN — PANTOPRAZOLE SODIUM 40 MG: 40 TABLET, DELAYED RELEASE ORAL at 05:04

## 2018-01-01 RX ADMIN — LEVOTHYROXINE SODIUM 50 MCG: 50 TABLET ORAL at 05:50

## 2018-01-01 RX ADMIN — ATORVASTATIN CALCIUM 80 MG: 40 TABLET, FILM COATED ORAL at 20:52

## 2018-01-01 RX ADMIN — POTASSIUM CHLORIDE AND DEXTROSE MONOHYDRATE 30 ML/HR: 150; 5 INJECTION, SOLUTION INTRAVENOUS at 12:15

## 2018-01-01 RX ADMIN — Medication 2 MG: at 03:28

## 2018-01-01 RX ADMIN — HEPARIN SODIUM 5000 UNITS: 5000 INJECTION, SOLUTION INTRAVENOUS; SUBCUTANEOUS at 20:14

## 2018-01-01 RX ADMIN — ASPIRIN 81 MG 81 MG: 81 TABLET ORAL at 08:26

## 2018-01-01 RX ADMIN — HYDROMORPHONE HYDROCHLORIDE 2 MG: 2 INJECTION, SOLUTION INTRAMUSCULAR; INTRAVENOUS; SUBCUTANEOUS at 05:12

## 2018-01-01 RX ADMIN — HYDROMORPHONE HYDROCHLORIDE 0.5 MG: 1 INJECTION, SOLUTION INTRAMUSCULAR; INTRAVENOUS; SUBCUTANEOUS at 02:52

## 2018-01-01 RX ADMIN — HYDROCODONE BITARTRATE AND ACETAMINOPHEN 1 TABLET: 7.5; 325 TABLET ORAL at 15:50

## 2018-01-01 RX ADMIN — RANOLAZINE 500 MG: 500 TABLET, FILM COATED, EXTENDED RELEASE ORAL at 08:38

## 2018-01-01 RX ADMIN — MORPHINE SULFATE 45 MG: 30 TABLET, EXTENDED RELEASE ORAL at 21:05

## 2018-01-01 RX ADMIN — MORPHINE SULFATE 6 MG: 2 INJECTION, SOLUTION INTRAMUSCULAR; INTRAVENOUS at 15:03

## 2018-01-01 RX ADMIN — MORPHINE SULFATE 30 MG: 30 TABLET, EXTENDED RELEASE ORAL at 09:21

## 2018-01-01 RX ADMIN — CITALOPRAM HYDROBROMIDE 40 MG: 40 TABLET ORAL at 08:15

## 2018-01-01 RX ADMIN — METOPROLOL TARTRATE 12.5 MG: 25 TABLET, FILM COATED ORAL at 09:26

## 2018-01-01 RX ADMIN — HEPARIN SODIUM 11 UNITS/KG/HR: 10000 INJECTION, SOLUTION INTRAVENOUS at 08:00

## 2018-01-01 RX ADMIN — ATORVASTATIN CALCIUM 80 MG: 40 TABLET, FILM COATED ORAL at 21:16

## 2018-01-01 RX ADMIN — HYDROMORPHONE HYDROCHLORIDE 2 MG: 2 INJECTION, SOLUTION INTRAMUSCULAR; INTRAVENOUS; SUBCUTANEOUS at 22:31

## 2018-01-01 RX ADMIN — CITALOPRAM HYDROBROMIDE 40 MG: 40 TABLET ORAL at 08:23

## 2018-01-01 RX ADMIN — MORPHINE SULFATE 30 MG: 30 TABLET, EXTENDED RELEASE ORAL at 20:16

## 2018-01-01 RX ADMIN — HYDROMORPHONE HYDROCHLORIDE 1 MG: 2 INJECTION INTRAMUSCULAR; INTRAVENOUS; SUBCUTANEOUS at 22:16

## 2018-01-01 RX ADMIN — ASPIRIN 325 MG ORAL TABLET 325 MG: 325 PILL ORAL at 14:17

## 2018-01-01 RX ADMIN — RANOLAZINE 500 MG: 500 TABLET, FILM COATED, EXTENDED RELEASE ORAL at 21:04

## 2018-01-01 RX ADMIN — HEPARIN SODIUM 49000 UNITS: 1000 INJECTION, SOLUTION INTRAVENOUS; SUBCUTANEOUS at 08:50

## 2018-01-01 RX ADMIN — MORPHINE SULFATE 30 MG: 30 TABLET, EXTENDED RELEASE ORAL at 21:54

## 2018-01-01 RX ADMIN — METOPROLOL TARTRATE 25 MG: 25 TABLET, FILM COATED ORAL at 22:24

## 2018-01-01 RX ADMIN — ASPIRIN 325 MG: 325 TABLET, DELAYED RELEASE ORAL at 08:43

## 2018-01-01 RX ADMIN — HEPARIN SODIUM 12.5 UNITS/KG/HR: 10000 INJECTION, SOLUTION INTRAVENOUS at 23:22

## 2018-01-01 RX ADMIN — Medication 2 TABLET: at 22:11

## 2018-01-01 RX ADMIN — FENTANYL CITRATE 25 MCG: 50 INJECTION, SOLUTION INTRAMUSCULAR; INTRAVENOUS at 22:02

## 2018-01-01 RX ADMIN — TICAGRELOR 90 MG: 90 TABLET ORAL at 02:31

## 2018-01-01 RX ADMIN — CITALOPRAM HYDROBROMIDE 40 MG: 40 TABLET ORAL at 08:47

## 2018-01-01 RX ADMIN — EPTIFIBATIDE 2 MCG/KG/MIN: 0.75 INJECTION, SOLUTION INTRAVENOUS at 00:13

## 2018-01-01 RX ADMIN — LEVOTHYROXINE SODIUM 50 MCG: 50 TABLET ORAL at 06:18

## 2018-01-01 RX ADMIN — HEPARIN SODIUM 5000 UNITS: 5000 INJECTION, SOLUTION INTRAVENOUS; SUBCUTANEOUS at 14:18

## 2018-01-01 RX ADMIN — SODIUM CHLORIDE, POTASSIUM CHLORIDE, SODIUM LACTATE AND CALCIUM CHLORIDE: 600; 310; 30; 20 INJECTION, SOLUTION INTRAVENOUS at 07:16

## 2018-01-01 RX ADMIN — PANTOPRAZOLE SODIUM 40 MG: 40 TABLET, DELAYED RELEASE ORAL at 06:18

## 2018-01-01 RX ADMIN — BUPROPION HYDROCHLORIDE 300 MG: 150 TABLET, FILM COATED, EXTENDED RELEASE ORAL at 09:21

## 2018-01-01 RX ADMIN — ASPIRIN 325 MG: 325 TABLET, DELAYED RELEASE ORAL at 08:11

## 2018-01-01 RX ADMIN — NOREPINEPHRINE BITARTRATE 0.02 MCG/KG/MIN: 1 INJECTION INTRAVENOUS at 14:38

## 2018-01-01 RX ADMIN — HEPARIN SODIUM 5000 UNITS: 5000 INJECTION, SOLUTION INTRAVENOUS; SUBCUTANEOUS at 21:00

## 2018-01-01 RX ADMIN — Medication 10 ML: at 11:49

## 2018-01-01 RX ADMIN — OXYCODONE HYDROCHLORIDE AND ACETAMINOPHEN 1 TABLET: 10; 325 TABLET ORAL at 17:27

## 2018-01-01 RX ADMIN — EPTIFIBATIDE 2 MCG/KG/MIN: 0.75 INJECTION, SOLUTION INTRAVENOUS at 11:52

## 2018-01-01 RX ADMIN — OXYCODONE HYDROCHLORIDE AND ACETAMINOPHEN 2 TABLET: 5; 325 TABLET ORAL at 21:17

## 2018-01-01 RX ADMIN — ATORVASTATIN CALCIUM 80 MG: 40 TABLET, FILM COATED ORAL at 20:16

## 2018-01-01 RX ADMIN — DIAZEPAM 10 MG: 5 TABLET ORAL at 23:53

## 2018-01-01 RX ADMIN — MORPHINE SULFATE 30 MG: 30 TABLET, EXTENDED RELEASE ORAL at 08:38

## 2018-01-01 RX ADMIN — ASPIRIN 325 MG ORAL TABLET 325 MG: 325 PILL ORAL at 08:43

## 2018-01-01 RX ADMIN — HEPARIN SODIUM 5000 UNITS: 5000 INJECTION, SOLUTION INTRAVENOUS; SUBCUTANEOUS at 20:52

## 2018-01-01 RX ADMIN — BUPROPION HYDROCHLORIDE 300 MG: 150 TABLET, FILM COATED, EXTENDED RELEASE ORAL at 08:43

## 2018-01-01 RX ADMIN — NITROGLYCERIN 10 MCG/MIN: 20 INJECTION INTRAVENOUS at 20:41

## 2018-01-01 RX ADMIN — HYDROCODONE BITARTRATE AND ACETAMINOPHEN 1 TABLET: 7.5; 325 TABLET ORAL at 13:39

## 2018-01-01 RX ADMIN — PROTAMINE SULFATE 500 MG: 10 INJECTION, SOLUTION INTRAVENOUS at 10:30

## 2018-01-01 RX ADMIN — ASPIRIN 325 MG ORAL TABLET 325 MG: 325 PILL ORAL at 08:34

## 2018-01-01 RX ADMIN — LEVOTHYROXINE SODIUM 50 MCG: 50 TABLET ORAL at 06:02

## 2018-01-01 RX ADMIN — FEBUXOSTAT 40 MG: 40 TABLET ORAL at 08:58

## 2018-01-01 RX ADMIN — HEPARIN SODIUM 5000 UNITS: 5000 INJECTION, SOLUTION INTRAVENOUS; SUBCUTANEOUS at 05:41

## 2018-01-01 RX ADMIN — LEVOTHYROXINE SODIUM 50 MCG: 50 TABLET ORAL at 06:10

## 2018-01-01 RX ADMIN — BUPROPION HYDROCHLORIDE 300 MG: 150 TABLET, FILM COATED, EXTENDED RELEASE ORAL at 08:41

## 2018-01-01 RX ADMIN — HEPARIN SODIUM 4000 UNITS: 5000 INJECTION, SOLUTION INTRAVENOUS; SUBCUTANEOUS at 14:01

## 2018-01-01 RX ADMIN — PANTOPRAZOLE SODIUM 40 MG: 40 TABLET, DELAYED RELEASE ORAL at 05:37

## 2018-01-01 RX ADMIN — RANOLAZINE 500 MG: 500 TABLET, FILM COATED, EXTENDED RELEASE ORAL at 10:38

## 2018-01-01 RX ADMIN — ALBUMIN HUMAN 500 ML: 0.05 INJECTION, SOLUTION INTRAVENOUS at 14:20

## 2018-01-01 RX ADMIN — HEPARIN SODIUM 5000 UNITS: 5000 INJECTION, SOLUTION INTRAVENOUS; SUBCUTANEOUS at 05:18

## 2018-01-01 RX ADMIN — DIAZEPAM 10 MG: 5 TABLET ORAL at 01:13

## 2018-01-01 RX ADMIN — HYDROMORPHONE HYDROCHLORIDE 2 MG: 2 INJECTION, SOLUTION INTRAMUSCULAR; INTRAVENOUS; SUBCUTANEOUS at 10:47

## 2018-01-01 RX ADMIN — MORPHINE SULFATE 30 MG: 30 TABLET, EXTENDED RELEASE ORAL at 08:36

## 2018-01-01 RX ADMIN — Medication 2 MG: at 17:48

## 2018-01-01 RX ADMIN — MORPHINE SULFATE 45 MG: 30 TABLET, EXTENDED RELEASE ORAL at 20:56

## 2018-01-01 RX ADMIN — SODIUM CHLORIDE 50 ML/HR: 9 INJECTION, SOLUTION INTRAVENOUS at 07:50

## 2018-01-01 RX ADMIN — Medication 2 TABLET: at 20:19

## 2018-01-01 RX ADMIN — Medication 2 TABLET: at 20:22

## 2018-01-01 RX ADMIN — MORPHINE SULFATE 4 MG: 4 INJECTION INTRAVENOUS at 16:50

## 2018-01-01 RX ADMIN — TICAGRELOR 90 MG: 90 TABLET ORAL at 20:18

## 2018-01-01 RX ADMIN — CITALOPRAM HYDROBROMIDE 40 MG: 40 TABLET ORAL at 08:58

## 2018-01-01 RX ADMIN — BUPROPION HYDROCHLORIDE 300 MG: 150 TABLET, FILM COATED, EXTENDED RELEASE ORAL at 08:18

## 2018-01-01 RX ADMIN — PANTOPRAZOLE SODIUM 40 MG: 40 TABLET, DELAYED RELEASE ORAL at 05:50

## 2018-01-01 RX ADMIN — HEPARIN SODIUM 8 UNITS/KG/HR: 10000 INJECTION, SOLUTION INTRAVENOUS at 22:17

## 2018-01-01 RX ADMIN — HEPARIN SODIUM 3000 UNITS: 1000 INJECTION, SOLUTION INTRAVENOUS; SUBCUTANEOUS at 07:59

## 2018-01-01 RX ADMIN — MORPHINE SULFATE 45 MG: 30 TABLET, EXTENDED RELEASE ORAL at 08:22

## 2018-01-01 RX ADMIN — Medication 2 MG: at 21:22

## 2018-01-01 RX ADMIN — HEPARIN SODIUM 5000 UNITS: 5000 INJECTION, SOLUTION INTRAVENOUS; SUBCUTANEOUS at 20:57

## 2018-01-01 RX ADMIN — SODIUM CHLORIDE 75 ML/HR: 9 INJECTION, SOLUTION INTRAVENOUS at 03:13

## 2018-01-01 RX ADMIN — CITALOPRAM HYDROBROMIDE 40 MG: 40 TABLET ORAL at 08:35

## 2018-01-01 RX ADMIN — OXYCODONE HYDROCHLORIDE AND ACETAMINOPHEN 1 TABLET: 10; 325 TABLET ORAL at 08:07

## 2018-01-01 RX ADMIN — METOPROLOL TARTRATE 12.5 MG: 25 TABLET, FILM COATED ORAL at 20:14

## 2018-01-01 RX ADMIN — HYDROCODONE BITARTRATE AND ACETAMINOPHEN 1 TABLET: 7.5; 325 TABLET ORAL at 18:27

## 2018-01-01 RX ADMIN — CITALOPRAM HYDROBROMIDE 40 MG: 40 TABLET ORAL at 09:18

## 2018-01-01 RX ADMIN — SENNOSIDES AND DOCUSATE SODIUM 2 TABLET: 8.6; 5 TABLET ORAL at 20:17

## 2018-01-01 RX ADMIN — MORPHINE SULFATE 2 MG: 2 INJECTION, SOLUTION INTRAMUSCULAR; INTRAVENOUS at 01:56

## 2018-01-01 RX ADMIN — Medication 2 MG: at 20:16

## 2018-01-01 RX ADMIN — HYDROMORPHONE HYDROCHLORIDE 2 MG: 2 INJECTION, SOLUTION INTRAMUSCULAR; INTRAVENOUS; SUBCUTANEOUS at 16:12

## 2018-01-01 RX ADMIN — DIAZEPAM 10 MG: 5 TABLET ORAL at 23:18

## 2018-01-01 RX ADMIN — CITALOPRAM HYDROBROMIDE 40 MG: 40 TABLET ORAL at 09:52

## 2018-01-01 RX ADMIN — CITALOPRAM HYDROBROMIDE 40 MG: 40 TABLET ORAL at 08:26

## 2018-01-01 RX ADMIN — ISOSORBIDE MONONITRATE 90 MG: 60 TABLET, EXTENDED RELEASE ORAL at 08:37

## 2018-01-01 RX ADMIN — HYDROMORPHONE HYDROCHLORIDE 2 MG: 2 INJECTION, SOLUTION INTRAMUSCULAR; INTRAVENOUS; SUBCUTANEOUS at 21:35

## 2018-01-01 RX ADMIN — Medication 2 MG: at 08:07

## 2018-01-01 RX ADMIN — MORPHINE SULFATE 30 MG: 30 TABLET, EXTENDED RELEASE ORAL at 20:23

## 2018-01-01 RX ADMIN — ASPIRIN 81 MG CHEWABLE TABLET 81 MG: 81 TABLET CHEWABLE at 08:56

## 2018-01-01 RX ADMIN — HYDROCODONE BITARTRATE AND ACETAMINOPHEN 1 TABLET: 7.5; 325 TABLET ORAL at 06:05

## 2018-01-01 RX ADMIN — DIAZEPAM 10 MG: 5 TABLET ORAL at 21:39

## 2018-01-01 RX ADMIN — NICOTINE 1 PATCH: 21 PATCH, EXTENDED RELEASE TRANSDERMAL at 12:58

## 2018-01-01 RX ADMIN — MORPHINE SULFATE 30 MG: 30 TABLET, EXTENDED RELEASE ORAL at 11:56

## 2018-01-01 RX ADMIN — FEBUXOSTAT 40 MG: 40 TABLET ORAL at 08:34

## 2018-01-01 RX ADMIN — HYDROMORPHONE HYDROCHLORIDE 2 MG: 2 INJECTION, SOLUTION INTRAMUSCULAR; INTRAVENOUS; SUBCUTANEOUS at 05:52

## 2018-01-01 RX ADMIN — BUPROPION HYDROCHLORIDE 300 MG: 150 TABLET, FILM COATED, EXTENDED RELEASE ORAL at 08:22

## 2018-01-01 RX ADMIN — NOREPINEPHRINE BITARTRATE 0.02 MCG/KG/MIN: 1 INJECTION, SOLUTION, CONCENTRATE INTRAVENOUS at 07:02

## 2018-01-01 RX ADMIN — EPTIFIBATIDE 2 MCG/KG/MIN: 0.75 INJECTION, SOLUTION INTRAVENOUS at 19:01

## 2018-01-01 RX ADMIN — MORPHINE SULFATE 2 MG: 2 INJECTION, SOLUTION INTRAMUSCULAR; INTRAVENOUS at 05:46

## 2018-01-01 RX ADMIN — Medication 150 ML: at 05:04

## 2018-01-01 RX ADMIN — METOPROLOL TARTRATE 12.5 MG: 25 TABLET, FILM COATED ORAL at 22:11

## 2018-01-01 RX ADMIN — HEPARIN SODIUM 5000 UNITS: 5000 INJECTION, SOLUTION INTRAVENOUS; SUBCUTANEOUS at 20:17

## 2018-01-01 RX ADMIN — DIAZEPAM 10 MG: 5 TABLET ORAL at 00:11

## 2018-01-01 RX ADMIN — Medication 2 MG: at 23:40

## 2018-01-01 RX ADMIN — HEPARIN SODIUM 5000 UNITS: 5000 INJECTION, SOLUTION INTRAVENOUS; SUBCUTANEOUS at 15:52

## 2018-01-01 RX ADMIN — EPTIFIBATIDE 2 MCG/KG/MIN: 0.75 INJECTION, SOLUTION INTRAVENOUS at 06:31

## 2018-01-01 RX ADMIN — ASPIRIN 325 MG ORAL TABLET 325 MG: 325 PILL ORAL at 08:31

## 2018-01-01 RX ADMIN — PANTOPRAZOLE SODIUM 40 MG: 40 TABLET, DELAYED RELEASE ORAL at 06:42

## 2018-01-01 RX ADMIN — Medication 2 TABLET: at 20:09

## 2018-01-01 RX ADMIN — PANTOPRAZOLE SODIUM 40 MG: 40 TABLET, DELAYED RELEASE ORAL at 05:39

## 2018-01-01 RX ADMIN — PANTOPRAZOLE SODIUM 40 MG: 40 TABLET, DELAYED RELEASE ORAL at 05:21

## 2018-01-01 RX ADMIN — HYDROMORPHONE HYDROCHLORIDE 2 MG: 2 INJECTION, SOLUTION INTRAMUSCULAR; INTRAVENOUS; SUBCUTANEOUS at 20:32

## 2018-01-01 RX ADMIN — DIAZEPAM 10 MG: 5 TABLET ORAL at 20:18

## 2018-01-01 RX ADMIN — ROCURONIUM BROMIDE 100 MG: 10 SOLUTION INTRAVENOUS at 07:16

## 2018-01-01 RX ADMIN — PANTOPRAZOLE SODIUM 40 MG: 40 TABLET, DELAYED RELEASE ORAL at 06:05

## 2018-01-01 RX ADMIN — HEPARIN SODIUM 5000 UNITS: 1000 INJECTION, SOLUTION INTRAVENOUS; SUBCUTANEOUS at 19:09

## 2018-01-01 RX ADMIN — NITROGLYCERIN 10 MCG/MIN: 20 INJECTION INTRAVENOUS at 20:32

## 2018-01-01 RX ADMIN — MORPHINE SULFATE 2 MG: 2 INJECTION, SOLUTION INTRAMUSCULAR; INTRAVENOUS at 21:55

## 2018-01-01 RX ADMIN — FENTANYL CITRATE 25 MCG: 50 INJECTION, SOLUTION INTRAMUSCULAR; INTRAVENOUS at 15:07

## 2018-01-01 RX ADMIN — WATER 2 G: 1 INJECTION INTRAMUSCULAR; INTRAVENOUS; SUBCUTANEOUS at 10:40

## 2018-01-01 RX ADMIN — ASPIRIN 325 MG: 325 TABLET, DELAYED RELEASE ORAL at 09:52

## 2018-01-01 RX ADMIN — HYDROCODONE BITARTRATE AND ACETAMINOPHEN 1 TABLET: 7.5; 325 TABLET ORAL at 14:19

## 2018-01-01 RX ADMIN — NITROGLYCERIN 1 INCH: 20 OINTMENT TOPICAL at 16:55

## 2018-01-01 RX ADMIN — TICAGRELOR 90 MG: 90 TABLET ORAL at 08:26

## 2018-01-01 RX ADMIN — ASPIRIN 325 MG: 325 TABLET, DELAYED RELEASE ORAL at 08:23

## 2018-01-01 RX ADMIN — ATORVASTATIN CALCIUM 80 MG: 40 TABLET, FILM COATED ORAL at 20:28

## 2018-01-01 RX ADMIN — SENNOSIDES AND DOCUSATE SODIUM 2 TABLET: 8.6; 5 TABLET ORAL at 08:37

## 2018-01-01 RX ADMIN — LEVOTHYROXINE SODIUM 50 MCG: 50 TABLET ORAL at 05:41

## 2018-01-01 RX ADMIN — TICAGRELOR 90 MG: 90 TABLET ORAL at 09:21

## 2018-01-01 RX ADMIN — MORPHINE SULFATE 45 MG: 30 TABLET, EXTENDED RELEASE ORAL at 20:59

## 2018-01-01 RX ADMIN — HEPARIN SODIUM 5000 UNITS: 5000 INJECTION, SOLUTION INTRAVENOUS; SUBCUTANEOUS at 05:50

## 2018-01-01 RX ADMIN — HYDROMORPHONE HYDROCHLORIDE 2 MG: 2 INJECTION, SOLUTION INTRAMUSCULAR; INTRAVENOUS; SUBCUTANEOUS at 20:24

## 2018-01-01 RX ADMIN — DIAZEPAM 10 MG: 5 TABLET ORAL at 00:08

## 2018-01-01 RX ADMIN — ATORVASTATIN CALCIUM 80 MG: 40 TABLET, FILM COATED ORAL at 20:22

## 2018-01-01 RX ADMIN — METOPROLOL TARTRATE 12.5 MG: 25 TABLET, FILM COATED ORAL at 08:34

## 2018-01-01 RX ADMIN — SODIUM CHLORIDE, POTASSIUM CHLORIDE, SODIUM LACTATE AND CALCIUM CHLORIDE 9 ML/HR: 600; 310; 30; 20 INJECTION, SOLUTION INTRAVENOUS at 06:18

## 2018-01-01 RX ADMIN — NITROGLYCERIN 5 MCG/MIN: 20 INJECTION INTRAVENOUS at 08:45

## 2018-01-01 RX ADMIN — HEPARIN SODIUM 5000 UNITS: 5000 INJECTION, SOLUTION INTRAVENOUS; SUBCUTANEOUS at 21:04

## 2018-01-01 RX ADMIN — PANTOPRAZOLE SODIUM 40 MG: 40 TABLET, DELAYED RELEASE ORAL at 06:53

## 2018-01-01 RX ADMIN — ASPIRIN 325 MG: 325 TABLET, DELAYED RELEASE ORAL at 09:26

## 2018-01-01 RX ADMIN — CARVEDILOL 6.25 MG: 6.25 TABLET, FILM COATED ORAL at 17:21

## 2018-01-01 RX ADMIN — PANTOPRAZOLE SODIUM 40 MG: 40 INJECTION, POWDER, FOR SOLUTION INTRAVENOUS at 17:26

## 2018-01-01 RX ADMIN — DEXMEDETOMIDINE HYDROCHLORIDE 0.5 MCG/KG/HR: 4 INJECTION, SOLUTION INTRAVENOUS at 12:34

## 2018-01-01 RX ADMIN — Medication 2 TABLET: at 20:45

## 2018-01-01 RX ADMIN — LEVOTHYROXINE SODIUM 50 MCG: 50 TABLET ORAL at 06:42

## 2018-01-01 RX ADMIN — HEPARIN SODIUM 5000 UNITS: 5000 INJECTION, SOLUTION INTRAVENOUS; SUBCUTANEOUS at 22:17

## 2018-01-01 RX ADMIN — MUPIROCIN 1 APPLICATION: 20 OINTMENT TOPICAL at 21:31

## 2018-01-01 RX ADMIN — ATORVASTATIN CALCIUM 80 MG: 40 TABLET, FILM COATED ORAL at 20:32

## 2018-01-01 RX ADMIN — METOPROLOL TARTRATE 12.5 MG: 25 TABLET, FILM COATED ORAL at 20:19

## 2018-02-02 NOTE — ED PROVIDER NOTES
Subjective   Patient is a 37 y.o. male presenting with chest pain.   Chest Pain   Pain location:  Substernal area  Pain quality: pressure and tightness    Pain radiates to:  R arm and L arm  Pain severity:  Moderate  Onset quality:  Sudden  Duration:  90 minutes  Timing:  Constant  Progression:  Improving  Chronicity:  New  Context: at rest    Context: not breathing, not drug use, not eating, not intercourse, not lifting, not movement, not raising an arm, not stress and not trauma    Context comment:  History of MI in August of 2016 that required stenting through cardiac cath; NSTEMI in September 2017; no history of CABG; doesn't smoke; EMS gave him aspirin and he's taken 5 nitro  Relieved by:  Nitroglycerin and aspirin  Worsened by:  Nothing  Associated symptoms: no abdominal pain, no AICD problem, no altered mental status, no anorexia, no anxiety, no back pain, no claudication, no cough, no diaphoresis, no dizziness, no dysphagia, no fatigue, no fever, no headache, no heartburn, no lower extremity edema, no nausea, no near-syncope, no numbness, no orthopnea, no palpitations, no PND, no shortness of breath, no syncope, no vomiting and no weakness    Risk factors: coronary artery disease, high cholesterol, male sex and obesity    Risk factors: no aortic disease, no birth control, no diabetes mellitus, no Susan-Danlos syndrome, no immobilization, no Marfan's syndrome, not pregnant, no prior DVT/PE, no smoking and no surgery        Review of Systems   Constitutional: Negative for diaphoresis, fatigue and fever.   HENT: Negative for trouble swallowing.    Respiratory: Negative for cough and shortness of breath.    Cardiovascular: Positive for chest pain. Negative for palpitations, orthopnea, claudication, leg swelling, syncope, PND and near-syncope.   Gastrointestinal: Negative for abdominal pain, anorexia, heartburn, nausea and vomiting.   Musculoskeletal: Negative for back pain.   Neurological: Negative for  dizziness, weakness, numbness and headaches.       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       Allergies   Allergen Reactions   • Benadryl [Diphenhydramine] Itching     IV benadryl per patient       Past Surgical History:   Procedure Laterality Date   • BONE MARROW TRANSPLANT     • CARDIAC CATHETERIZATION N/A 8/22/2016    Procedure: Left Heart Cath;  Surgeon: Devorah Hightower MD;  Location:  [facility] CATH INVASIVE LOCATION;  Service:    • CARDIAC CATHETERIZATION N/A 9/23/2017    Procedure: Left Heart Cath;  Surgeon: Devorah Hightower MD;  Location:  [facility] CATH INVASIVE LOCATION;  Service:    • CORONARY STENT PLACEMENT         Family History   Problem Relation Age of Onset   • Hypertension Father    • Cancer Father      Colon   • Arthritis Mother    • No Known Problems Sister        Social History     Social History   • Marital status:      Spouse name: N/A   • Number of children: N/A   • Years of education: N/A     Social History Main Topics   • Smoking status: Never Smoker   • Smokeless tobacco: None   • Alcohol use No   • Drug use: No   • Sexual activity: Not Asked     Other Topics Concern   • None     Social History Narrative           Objective   Physical Exam   Constitutional: He is oriented to person, place, and time. He appears well-developed and well-nourished. No distress.   HENT:   Head: Normocephalic and atraumatic.   Right Ear: External ear normal.   Left Ear: External ear normal.   Nose: Nose normal.   Eyes: Conjunctivae and EOM are normal. Pupils are equal, round, and reactive to light.   Neck: Normal range of motion. Neck supple. No JVD present. No tracheal deviation present.   Cardiovascular: Normal rate, regular rhythm and normal heart sounds.  Exam reveals no gallop and no friction rub.    No murmur heard.  Pulmonary/Chest: Effort normal and breath sounds normal. No respiratory  distress. He has no wheezes. He has no rales. He exhibits no tenderness.   Abdominal: Soft. Bowel sounds are normal. There is no tenderness.   Musculoskeletal: Normal range of motion. He exhibits no edema or deformity.   Neurological: He is alert and oriented to person, place, and time. No cranial nerve deficit.   Skin: Skin is warm and dry. No rash noted. He is not diaphoretic. No erythema. No pallor.   Psychiatric: He has a normal mood and affect. His behavior is normal. Thought content normal.   Nursing note and vitals reviewed.      Procedures         ED Course  ED Course   Value Comment By Time   ECG 12 Lead Normal sinus rhythm; no acute ST or T wave changes per Dr. Culp. ROBBIN Nelson 02/02 1832    Per pharmacy, there is no morphine and we only have 2 mg dilaudid so we will use oral medications ROBBIN Nelson 02/02 1926   XR Chest 2 View No acute findings per Dr. Gold. ROBBIN Nelson 02/02 1938    Endorsed to ROBBIN Boswell 02/02 2002                HEART Score (for prediction of 6-week risk of major adverse cardiac event) reviewed and/or performed as part of the patient evaluation and treatment planning process.  The result associated with this review/performance is: 4       MDM  Number of Diagnoses or Management Options  Chest pain, unspecified type: new and requires workup  NSTEMI (non-ST elevated myocardial infarction): new and requires workup     Amount and/or Complexity of Data Reviewed  Clinical lab tests: ordered and reviewed  Tests in the radiology section of CPT®: reviewed and ordered  Tests in the medicine section of CPT®: reviewed and ordered  Decide to obtain previous medical records or to obtain history from someone other than the patient: yes    Risk of Complications, Morbidity, and/or Mortality  Presenting problems: moderate  Diagnostic procedures: moderate  Management options: moderate    Patient Progress  Patient progress: improved      Final  diagnoses:   Chest pain, unspecified type   NSTEMI (non-ST elevated myocardial infarction)            Tyler Wahl, APRN  02/02/18 [id number]

## 2018-02-03 NOTE — H&P
[facility] Medicine Services  HISTORY AND PHYSICAL    Patient Name: Paulie Jordan  : 1980  MRN: [id number]  Primary Care Physician: Cesar Neri MD    Subjective   Subjective     Chief Complaint: Chest Pain    HPI:  Paulie Jordan is a 37 y.o. male who presents to [facility] with complaints of chest pain. Patient states his chest pain started at 1630 and radiated down both arms and developed shortness of breath. Patient states he took 5 nitroglycerin SL tablets with no relief of symptoms. At that time he called EMS to take him to the ED for evaluation. While in the emergency department patient was found to have a troponin of 0.059 & 0.251. Patient has a past medical history of NSTEMI with stent in 2016, HTN, HDL, IBS, and leukemia (remission since ). Patient was transferred to [facility] for a higher level of care, and will be admitted to the hospital medicine service for further evaluation and treatment.     Review of Systems   Constitutional: Negative for chills, diaphoresis, fatigue and fever.   Respiratory: Positive for shortness of breath. Negative for cough and wheezing.    Cardiovascular: Positive for chest pain. Negative for palpitations and leg swelling.   Gastrointestinal: Negative for abdominal pain, nausea and vomiting.   Genitourinary: Negative for dysuria, frequency, hematuria and urgency.   Musculoskeletal: Negative for arthralgias and myalgias.   Skin: Negative for color change, pallor, rash and wound.   Neurological: Negative for dizziness, syncope, weakness and light-headedness.   Psychiatric/Behavioral: Negative for agitation and confusion.   All other systems reviewed and are negative.     Otherwise 10-system ROS reviewed and is negative except as mentioned in the HPI.    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     Social History:  reports that he has never smoked. He does not have any smokeless tobacco history on file. He reports that he does not drink alcohol or use illicit drugs.  Social History     Social History Narrative       Medications:  Prescriptions Prior to Admission   Medication Sig Dispense Refill Last Dose   • aspirin 81 MG tablet Take 1 tablet by mouth Daily. 30 tablet 11    • buPROPion XL (WELLBUTRIN XL) 300 MG 24 hr tablet Take 300 mg by mouth Daily.   9/22/2017 at 0700   • carvedilol (COREG) 6.25 MG tablet Take 6.25 mg by mouth Every 12 (Twelve) Hours.   9/22/2017 at 0700   • citalopram (CeleXA) 40 MG tablet Take 40 mg by mouth daily.   9/22/2017 at 0700   • diazePAM (VALIUM) 10 MG tablet Take 10 mg by mouth Every Night.   9/21/2017 at PM   • febuxostat (ULORIC) 40 MG tablet Take 80 mg by mouth Daily.   9/22/2017 at 0700   • isosorbide mononitrate (IMDUR) 30 MG 24 hr tablet Take 2 tablets by mouth Daily. 30 tablet 6    • levothyroxine (SYNTHROID, LEVOTHROID) 50 MCG tablet Take 50 mcg by mouth daily.   9/22/2017 at 0700   • morphine (MS CONTIN) 30 MG 12 hr tablet Take 30 mg by mouth 2 (Two) Times a Day.   9/22/2017 at 0700   • nitroglycerin (NITROSTAT) 0.4 MG SL tablet 1 under the tongue as needed for angina, may repeat q5mins for up three doses (Patient taking differently: Place 0.4 mg under the tongue Every 5 (Five) Minutes As  Needed. 1 under the tongue as needed for angina, may repeat q5mins for up three doses) 100 tablet 3 Unknown at Unknown time   • olopatadine (PATANOL) 0.1 % ophthalmic solution Administer 1 drop to both eyes Daily As Needed for Allergies.   Unknown at Unknown time   • Omega 3 1000 MG capsule Take 1 capsule by mouth 2 (Two) Times a Day.   9/22/2017 at 0700   • omeprazole (PriLOSEC) 20 MG capsule Take 20 mg by mouth daily.   9/22/2017 at 0700   • rosuvastatin (CRESTOR) 20 MG tablet Take 2 tablets by mouth Daily. 30 tablet 11    • ticagrelor (BRILINTA) 90 MG tablet tablet Take 1 tablet by mouth 2 (Two) Times a Day. 60 tablet 11    • ciprofloxacin (CILOXAN) 0.3 % ophthalmic solution Administer 2 drops to both eyes Daily As Needed.   Unknown at Unknown time   • traZODone (DESYREL) 50 MG tablet Take 50 mg by mouth At Night As Needed for Sleep.   9/20/2017 at PM       Allergies   Allergen Reactions   • Benadryl [Diphenhydramine] Itching     IV benadryl per patient       Objective   Objective     Vital Signs:   Temp:  [97.6 °F (36.4 °C)] 97.6 °F (36.4 °C)        Physical Exam   Constitutional: He is oriented to person, place, and time. He appears well-developed and well-nourished.   HENT:   Head: Normocephalic and atraumatic.   Eyes: EOM are normal. Pupils are equal, round, and reactive to light. No scleral icterus.   Neck: Normal range of motion. Neck supple. No JVD present.   Cardiovascular: Normal rate, regular rhythm, normal heart sounds and intact distal pulses.  Exam reveals no gallop and no friction rub.    No murmur heard.  Pulmonary/Chest: Effort normal and breath sounds normal. No respiratory distress. He has no wheezes. He has no rales. He exhibits no tenderness.   Abdominal: Soft. Bowel sounds are normal. He exhibits no distension and no mass. There is no tenderness. There is no rebound and no guarding. No hernia.   Musculoskeletal: Normal range of motion. He exhibits no edema, tenderness or deformity.    Neurological: He is alert and oriented to person, place, and time.   Skin: Skin is warm and dry. No rash noted. No erythema. No pallor.   Psychiatric: He has a normal mood and affect. His behavior is normal. Judgment and thought content normal.   Nursing note and vitals reviewed.    Results Reviewed:  I have personally reviewed current lab, radiology, and data and agree.      Results from last 7 days  Lab Units 02/02/18  2148   WBC 10*3/mm3 8.37   HEMOGLOBIN g/dL 14.6   HEMATOCRIT % 43.4   PLATELETS 10*3/mm3 213   INR  1.00       Results from last 7 days  Lab Units 02/02/18  2047 02/02/18  1848   SODIUM mmol/L  --  134*   POTASSIUM mmol/L  --  3.9   CHLORIDE mmol/L  --  104   CO2 mmol/L  --  23.8*   BUN mg/dL  --  12   CREATININE mg/dL  --  1.11   GLUCOSE mg/dL  --  122*   CALCIUM mg/dL  --  8.7   ALT (SGPT) U/L  --  34   AST (SGOT) U/L  --  30   TROPONIN I ng/mL 0.251* 0.059*     Estimated Creatinine Clearance: 124.1 mL/min (by C-G formula based on Cr of 1.11).  Brief Urine Lab Results  (Last result in the past 365 days)      Color   Clarity   Blood   Leuk Est   Nitrite   Protein   CREAT   Urine HCG        02/02/18 1930 Yellow Clear Negative Negative Negative Negative             Imaging Results (last 24 hours)     ** No results found for the last 24 hours. **        Results for orders placed during the hospital encounter of 01/09/17   Adult Transthoracic Echo Complete    Narrative · The study is technically difficult for diagnosis.  · Left ventricular function is normal. Estimated EF = 55%.  · All left ventricular wall segments contract normally.  · There is no evidence of pericardial effusion. There is evidence of a fat   pad present.     Compared to the study of 8/24/2016, there are no significant changes.         Assessment/Plan   Assessment / Plan     Hospital Problem List     * (Principal)NSTEMI (non-ST elevated myocardial infarction)    Hypertension    Coronary artery disease involving native coronary  artery of native heart    Overview Addendum 8/24/2016  9:06 AM by ROBBIN Kunz     1.  [facility] 8-22-16  · Single-vessel CAD with total ostial occlusion of the LAD.  · Successful thrombectomy followed by PTCA/stenting of the LAD with 3.5 x 33 mm drug-coated stent reducing the 100% stenosis to 0%.  · No other significant CAD.  · Moderate left ventricular systolic dysfunction, ejection fraction 40-45%.  · Normal hemodynamics.            Dyslipidemia    History of leukemia        Assessment & Plan:  - Admit to telemetry  - VS q4h  - Consult to Cardiology   - NSTEMI  - Trend troponin  - AM EKG  - AM Labs  - NPO   - [facility] 9/2017    - EF 50%   - Patent stent to LAD  - Heparin drip in ED   - Continue  - Nitro drip in ED   - Continue  - Medications had been changed after last admission   - Increase in Imdur   - Change plavix to brillenta   - Continue home medications as appropriate     DVT prophylaxis: TEDs/SCDs/Heparin Drip    CODE STATUS: FULL      Admission Status:  I believe this patient meets INPATIENT status due to the need for care which can only be reasonably provided in an hospital setting such as aggressive/expedited ancillary services and/or consultation services, the necessity for IV medications, close physician monitoring and/or the possible need for procedures.  In such, I feel patient’s risk for adverse outcomes and need for care warrant INPATIENT evaluation and predict the patient’s care encounter to likely last beyond 2 midnights.    Evelyn Artis, APRN  02/03/18   2:23 AM        Brief Attending Admission Attestation     I have seen and examined the patient, performing an independent face-to-face diagnostic evaluation with plan of care reviewed and developed with the advanced practice clinician (APC).      Brief Summary Statement/HPI:   Pt seen ~2330    Paulie Jordan is a 37 y.o. male with PMHX of early CAD and hx of stent to LAD, HTN, HL, leukemia.  Transferred from [facility] for NSTEMI.  Presented to  "[facility] after acute onset midsternal chest pressure and \"squeezing\" pain while at rest, radiating to BUE, associated with dyspnea, not relived with SL nitro x5.  Denies palpitations, presyncope, N/V.  Pt took 325 ASA and presented to ED where serial troponins showed upward positive trend.  Similar prior presentation Sept 2017 with significant troponin elevations but LHC without overt ischemic lesion.  Current CP not the same as his prior time of stent placement but more like \"some one sitting on my chest\". Currently on nitro and hep gtt, pain now 2/100 from prior 7-8/10.       Attending Physical Exam:  Constitutional: No acute distress, awake, alert, nontoxic, normal body habitus  Respiratory: Clear to auscultation bilaterally, good effort, nonlabored respirations   Cardiovascular: RRR, no murmur  Gastrointestinal: Soft, nontender, nondistended  Musculoskeletal: No peripheral edema  Psychiatric: Appropriate affect, good insight and judgement, cooperative    Brief Assessment/Plan :  See above for further detailed assessment and plan developed with APC which I have reviewed and/or edited.    Stephanie Mar MD  02/03/18  4:21 AM       Critical Care time spent in direct patient care:    40 minutes (excluding procedure time, if applicable) including high complexity decision making to assess and treat vital organ system failure in this individual who has impairment of one or more vital organ systems such that there is a high probability of imminent or life threatening deterioration in the patient’s condition and failure to initiate the above interventions on an urgent basis would likely result in sudden, clinically significant or life threatening deterioration in the patient's condition.  4:21 AM      "

## 2018-02-03 NOTE — PLAN OF CARE
Problem: Patient Care Overview (Adult)  Goal: Plan of Care Review  Outcome: Ongoing (interventions implemented as appropriate)   02/03/18 0503   Coping/Psychosocial Response Interventions   Plan Of Care Reviewed With patient   Patient Care Overview   Progress progress toward functional goals as expected   Outcome Evaluation   Outcome Summary/Follow up Plan VSS. NPO. Pt on heparin & nitro gtt. CP free since admit. NSR on tele.        Problem: Acute Coronary Syndrome (ACS) (Adult)  Goal: Signs and Symptoms of Listed Potential Problems Will be Absent or Manageable (Acute Coronary Syndrome)  Outcome: Ongoing (interventions implemented as appropriate)   02/03/18 0503   Acute Coronary Syndrome (ACS)   Problems Assessed (Acute Coronary Syndrome (ACS)) all   Problems Present (Acute Coronary Syndrome (ACS)) chest pain (angina)

## 2018-02-03 NOTE — ED NOTES
Faxed facesheet to [facility] at this time.      Marysol Cotter  02/02/18 2570

## 2018-02-03 NOTE — NURSING NOTE
"  ACC REVIEW REPORT: [facility]        PATIENT NAME: Paulie Jordan    PATIENT ID: [id number]    BED: N604    BED TYPE: TELEMETRY    BED GIVEN TO: HONYE NARANJO    TIME BED GIVEN: 2225    YOB: 1980    AGE: 37 YEAR OLD    GENDER: MALE    TODAY'S DATE: 2/2/2018    TRANSFER DATE: 2/2/2018    TRANSFERRING FACILITY: [facility]    TRANSFERRING FACILITY PHONE # : [phone]    DATE/TIME REQUEST RECEIVED: 2/2/18 @ 2145    [facility] RN: CHARISSE PANDA RN    REPORT FROM: HONEY NARANJO    TIME REPORT TAKEN: 2225    DIAGNOSIS: NSTEMI    REASON FOR TRANSFER TO [facility]: HIGHER LEVEL OF CARE    TRANSPORTATION: AMBULANCE    CLINICAL REASON FOR TRANSFER TO [facility]: THE PATIENT WENT TO THE ER COMPLAINING OF CHEST PAIN THAT RADIATES DOWN BOTH ARMS AND INTO HIS SHOULDERS.  HE TOOK 5 NITRO AND A VALIUM PTA.  TROPONIN'S ARE 0.059 AND 0.251.  HIS GLUCOSE WAS 64 AND EMS GAVE HIM GLUCAGON. IT IS .  HE HAS AN EXTENSIVE CARDIAC HISTORY. HE HAD A NSTEMI IN AUGUST AND A STEMI IN SEPT.      CLINICAL INFORMATION    ALLERGIES: BENADRYL    LAST VITAL SIGNS:  TIME: 2200  TEMP: 98.0  PULSE: 78  B/P: 127/89  RESP: 14    MEDS/IV FLUIDS: #20 IN THE RIGHT AC AND #20 IN THE LEFT HAND      CARDIAC SYSTEM:    CHEST PAIN: YES    RATE: HE CURRENTLY RATES IT AS A \"5\"    RHYTHM: NSR    CARDIAC ENZYMES: TROPONIN'S ARE 0.059 AND 0.251    RESPIRATORY SYSTEM:    OXYGEN: ROOM AIR    O2 SAT: 100%    CNS/MUSCULOSKELETAL    ALERT AND ORIENTED:X3    PAST MEDICAL HISTORY: STEMI AND NSTEMI      Charisse Panda RN  2/2/2018  10:34 PM  "

## 2018-02-03 NOTE — ED NOTES
Called [facility] for their Hospitalist, Dr. Gray, per [facility]. Talked to Tray, He states he will have Dr. Gray Call us back.      Marysol Cotter  02/02/18 1050

## 2018-02-03 NOTE — PROGRESS NOTES
IM update note:    H&P and diagnostic data reviewed. Pt is a 36 yo M with CAD with history of stent placement, HTN, HLD, and leukemia. He presented to [facility] with chest pain, found to have elevated trop, but no significant EKG changes. He had similar events back in 9/2017 where LHC was performed and found to be unremarkable. Currently feeling better and trop is trending down. Pt had been seen Dr Triana and plans for medical management and anticipatory care over the next 48 hrs noted. Exam unremarkable. No other active medical problems at this time. Will see again tomorrow.

## 2018-02-03 NOTE — CONSULTS
[place] [facility] AT [facility]   [address], [address]  [place], [place], [address] [phone]  [email or web address]           INPATIENT CONSULTATION NOTE    Referring Physician: MD Louise Hernandez Md  [address] [place] [address] [address]  [place] [address]    Patient Care Team:  Patient Care Team:  Cesar Neri MD as PCP - General  Cesar Neri MD as PCP - Family Medicine  Cesar Neri MD as PCP - Claims Attributed    Reason for consultation: Chest pain         HPI:    Paulie Jordan is a 37 y.o. male.  History of Present Illness    Paulie Jordan is a 37 y.o. male who presents to [facility] with complaints of chest pain. The patient has a history of CAD, NSTEMI 2016 with PCI to the proximal LAD, recurrent NSTEMI in 9/2017 with a heart catheterization revealing small vessel disease and a patent stent.  Also with a history of leukemia in remission since 2000, hypertension, hyperlipidemia, IBS, chronic pain syndrome.  The patient presents with recurrent chest pain that was more severe yesterday at 4:30 PM that radiated down both arms and was associated with shortness of breath, was not relieved with 5 some legal nitroglycerin tablets.  He normally has relief from sublingual nitroglycerin after 1-2 tablets.  The pain was not exacerbated by exertional activity     PFSH:  Patient Active Problem List   Diagnosis   • ACS (acute coronary syndrome)   • Hypertension   • Hypercholesterolemia   • Coronary artery disease involving native coronary artery of native heart   • Cardiomyopathy   • Dyslipidemia   • Atypical chest pain   • History of leukemia   • Neuropathy due to chemotherapeutic drug       Current Facility-Administered Medications on File Prior to Encounter   Medication Dose Route Frequency Provider Last Rate Last Dose   • [COMPLETED] heparin (porcine) 5000 UNIT/ML injection 5,000 Units  40 Units/kg Intravenous Once Tyler Wahl, APRN    5,000 Units at 02/02/18 2217   • [COMPLETED] HYDROcodone-acetaminophen (NORCO)  MG per tablet 1 tablet  1 tablet Oral Once Carrillo Culp MD   1 tablet at 02/02/18 1938   • [COMPLETED] meperidine (DEMEROL) injection 12.5 mg  12.5 mg Intravenous Once Oxana Delmer Prince, DO   12.5 mg at 02/02/18 2316   • [COMPLETED] meperidine (DEMEROL) injection 25 mg  25 mg Intravenous Once Oxana Delmer Prince, DO   25 mg at 02/02/18 2023   • [COMPLETED] nitroglycerin (NITROSTAT) ointment 1 inch  1 inch Topical Once KRISTOPHER Martinez   1 inch at 02/02/18 2051   • [DISCONTINUED] heparin (porcine) 5000 UNIT/ML injection 2,500 Units  20 Units/kg Intravenous PRN KRISTOPHER Martinez       • [DISCONTINUED] heparin (porcine) 5000 UNIT/ML injection 5,000 Units  40 Units/kg Intravenous PRN Tyler Wahl APRN       • [DISCONTINUED] heparin 67628 units/250 mL (100 units/mL) in 0.45 % NaCl infusion  8 Units/kg/hr Intravenous Titrated KRISTOPHER Martinez   Stopped at 02/02/18 2316   • [DISCONTINUED] HYDROcodone-acetaminophen (NORCO) 5-325 MG per tablet 1 tablet  1 tablet Oral Once Carrillo Culp MD       • [DISCONTINUED] morphine injection 2 mg  2 mg Intravenous Once Carrillo Culp MD       • [DISCONTINUED] nitroglycerin 50 mg/250 mL (0.2 mg/mL) infusion  10-50 mcg/min Intravenous Titrated KRISTOPHER Martinez   Stopped at 02/02/18 2317   • [DISCONTINUED] sodium chloride 0.9 % flush 10 mL  10 mL Intravenous PRN ROBBIN Nelson         Current Outpatient Prescriptions on File Prior to Encounter   Medication Sig Dispense Refill   • aspirin 81 MG tablet Take 1 tablet by mouth Daily. 30 tablet 11   • buPROPion XL (WELLBUTRIN XL) 300 MG 24 hr tablet Take 300 mg by mouth Daily.     • carvedilol (COREG) 6.25 MG tablet Take 6.25 mg by mouth Every 12 (Twelve) Hours.     • citalopram (CeleXA) 40 MG tablet Take 40 mg by mouth daily.     • diazePAM (VALIUM) 10 MG tablet Take  10 mg by mouth Every Night.     • febuxostat (ULORIC) 40 MG tablet Take 80 mg by mouth Daily.     • isosorbide mononitrate (IMDUR) 30 MG 24 hr tablet Take 2 tablets by mouth Daily. 30 tablet 6   • levothyroxine (SYNTHROID, LEVOTHROID) 50 MCG tablet Take 50 mcg by mouth daily.     • morphine (MS CONTIN) 30 MG 12 hr tablet Take 30 mg by mouth 2 (Two) Times a Day.     • nitroglycerin (NITROSTAT) 0.4 MG SL tablet 1 under the tongue as needed for angina, may repeat q5mins for up three doses (Patient taking differently: Place 0.4 mg under the tongue Every 5 (Five) Minutes As Needed. 1 under the tongue as needed for angina, may repeat q5mins for up three doses) 100 tablet 3   • olopatadine (PATANOL) 0.1 % ophthalmic solution Administer 1 drop to both eyes Daily As Needed for Allergies.     • Omega 3 1000 MG capsule Take 1 capsule by mouth 2 (Two) Times a Day.     • omeprazole (PriLOSEC) 20 MG capsule Take 20 mg by mouth daily.     • rosuvastatin (CRESTOR) 20 MG tablet Take 2 tablets by mouth Daily. 30 tablet 11   • ticagrelor (BRILINTA) 90 MG tablet tablet Take 1 tablet by mouth 2 (Two) Times a Day. 60 tablet 11   • traZODone (DESYREL) 50 MG tablet Take 50 mg by mouth At Night As Needed for Sleep.     • [DISCONTINUED] ciprofloxacin (CILOXAN) 0.3 % ophthalmic solution Administer 2 drops to both eyes Daily As Needed.       Allergies   Allergen Reactions   • Benadryl [Diphenhydramine] Itching     IV benadryl per patient       Social History     Social History   • Marital status:      Spouse name: N/A   • Number of children: N/A   • Years of education: N/A     Social History Main Topics   • Smoking status: Never Smoker   • Smokeless tobacco: Not on file   • Alcohol use No   • Drug use: No   • Sexual activity: Not on file     Other Topics Concern   • Not on file     Social History Narrative     Family History   Problem Relation Age of Onset   • Hypertension Father    • Cancer Father      Colon   • Arthritis Mother    •  No Known Problems Sister        Review of Systems:  Positive for Chest pain, arm pain, mild chronic swelling of the ankles/feet  All other systems reviewed are negative.         Objective:       Vital Sign Min/Max for last 24 hours  Temp  Min: 96.4 °F (35.8 °C)  Max: 97.6 °F (36.4 °C)   BP  Min: 113/70  Max: 128/92   Pulse  Min: 74  Max: 88   Resp  Min: 16  Max: 20   SpO2  Min: 95 %  Max: 100 %   No Data Recorded    No intake or output data in the 24 hours ending 02/03/18 0911        Vitals:    02/03/18 0700   BP: 113/70   Pulse: 77   Resp: 18   Temp: 97 °F (36.1 °C)   SpO2: 100%       CONSTITUTIONAL: Well-nourished. In no acute distress.   SKIN: Warm and dry. No rashes noted  HEENT: Head is normocephalic and atraumatic. Mucous membranes are pink and moist.   NECK: Supple without masses or thyromegaly. There is no jugular venous distention at 30°.  LUNGS: Normal effort. Clear to auscultation bilaterally without wheezing, rhonchi, or rales noted.   CARDIOVASCULAR: The heart has a regular rate with a normal S1 and S2. There is no murmur, gallop, rub, or click appreciated.   PERIPHERAL VASCULAR: Carotid upstroke is 2+ bilaterally and without bruits. Radial pulses are 2+ bilaterally. Posterior tibial pulses are 2+ and symmetrical. There is no lower extremity edema.   ABDOMEN: Soft with no tenderness with palpitation. No hepatosplenomegaly  MUSCULOSKELETAL: Normal gait. No digital cyanosis  NEUROLOGICAL: Nonfocal.  PSYCHIATRIC: Alert, orientated x 3, appropriate affect     Labs:  Lab Results   Component Value Date    CHOL 189 02/03/2018    TRIG 458 (H) 02/03/2018    HDL 32 (L) 02/03/2018    LDLCALC 81 09/22/2017    LDLDIRECT 128 02/03/2018       Diagnostic Data:    EKG:  NSR, PRWP    Tele:  NSR    [facility] 9/2017  · No evidence of hemodynamically significant coronary artery disease.  · Widely patent stent of the LAD.  · Preserved left ventricular systolic function, ejection fraction 50%.  · Normal hemodynamics.    TTE  1/2017  · The study is technically difficult for diagnosis.  · Left ventricular function is normal. Estimated EF = 55%.  · All left ventricular wall segments contract normally.  · There is no evidence of pericardial effusion. There is evidence of a fat pad present.         Assessment and Plan:     Chest Pain  CAD, history NSTEMI stent to prox LAD  -The patient had a similar syndrome in 9/2017.  Repeat heart catheterization at that time revealed a widely patent stent and small vessel disease.  I suspect the patient again is having elevated troponins due to his small vessel disease, vasospasm.  Do not recommend a repeat heart catheterization at the current time.  -TTE  -Trend troponin till downtrending  -Continue aspirin, Brilinta, carvedilol, rosuvastatin  -Increase daily Imdur to 90 mg daily, start Ranexa 500 mg twice a day, discontinue nitroglycerin drip  -Continue heparin or 48 hours.  -If the pain recurs and is severe, or his troponin significantly elevates, or there are ECG changes suggestive of active ischemia, will repeat heart catheterization  -Possible discharge home on Monday    Leukemia, in remission  Hypertension  Mixed hyperlipidemia  Chronic noncardiac pain  Poor Memory    We will continue to follow the patient while the patient is in house. Thank you for allowing us to participate in the care of this patient.    Bryon Triana MD, MSc, [facility]  Interventional Cardiology  [place] [facility] at [facility]    2/3/2018

## 2018-02-04 PROBLEM — Z72.0 TOBACCO ABUSE: Status: ACTIVE | Noted: 2018-01-01

## 2018-02-04 PROBLEM — E66.9 OBESITY: Status: ACTIVE | Noted: 2018-01-01

## 2018-02-04 NOTE — PLAN OF CARE
Problem: Patient Care Overview (Adult)  Goal: Plan of Care Review  Outcome: Ongoing (interventions implemented as appropriate)   02/04/18 0456   Coping/Psychosocial Response Interventions   Plan Of Care Reviewed With patient   Patient Care Overview   Progress progress toward functional goals as expected   Outcome Evaluation   Outcome Summary/Follow up Plan VSS. Pt complaining of back pain. NSR on tele.        Problem: Acute Coronary Syndrome (ACS) (Adult)  Goal: Signs and Symptoms of Listed Potential Problems Will be Absent or Manageable (Acute Coronary Syndrome)  Outcome: Ongoing (interventions implemented as appropriate)   02/04/18 0456   Acute Coronary Syndrome (ACS)   Problems Assessed (Acute Coronary Syndrome (ACS)) all   Problems Present (Acute Coronary Syndrome (ACS)) none

## 2018-02-04 NOTE — PROGRESS NOTES
[facility]  PROGRESS NOTE    Patient Name: Paulie Jordan  : 1980  MRN: [id number]    Date of Admission: 2/3/2018  Length of Stay: 1  Primary Care Physician: Cesar Neri MD    Subjective   Subjective     CC:  F/u chest pain    HPI:  No acute events O/N. Feeling better, but still having vague/nagging substernal chest pressures. No dyspnea or chest pain.     Review of Systems  Otherwise ROS is negative except as mentioned in the HPI.    Objective   Objective     Vital Signs:   Temp:  [97.4 °F (36.3 °C)-98 °F (36.7 °C)] 97.8 °F (36.6 °C)  Heart Rate:  [71-94] 74  Resp:  [16-20] 20  BP: ()/(58-87) 111/59        Physical Exam:  General Assessment: No acute cardiopulmonary distress. Well developed and well nourished.    HEENT: NCAT, PERRL, MM moist    Neck: Supple    CVS: RRR, S1S2 normal, no murmurs    Resp: CTAB, no adventitious sound    Abd: soft, NT, ND, normal BS, no guarding or peritoneal signs    Ext: No edema, both calves are symmetric and NTTP    Neuro: No facial asymmetry, speech clear, strength equal bilat    Skin: W/D/I. No rash.    Psych: Affect is appropriate    Results Reviewed:  I have personally reviewed current lab, radiology, and data and agree.      Results from last 7 days  Lab Units 18  0456 18  2148 18  1848   WBC 10*3/mm3 7.56 8.37 7.13   HEMOGLOBIN g/dL 14.3 14.6 15.2   HEMATOCRIT % 43.1 43.4 46.0   PLATELETS 10*3/mm3 217 213 223   INR  1.01 1.00  --        Results from last 7 days  Lab Units 18  1715 18  1045 18  0456  18  1848   SODIUM mmol/L  --   --  135  --  134*   POTASSIUM mmol/L  --   --  4.0  --  3.9   CHLORIDE mmol/L  --   --  104  --  104   CO2 mmol/L  --   --  25.0  --  23.8*   BUN mg/dL  --   --  13  --  12   CREATININE mg/dL  --   --  1.00  --  1.11   GLUCOSE mg/dL  --   --  87  --  122*   CALCIUM mg/dL  --   --  8.7  --  8.7   ALT (SGPT) U/L  --   --   --   --  34   AST (SGOT)  "U/L  --   --   --   --  30   TROPONIN I ng/mL 2.367* 3.274* 2.472*  < > 0.059*   < > = values in this interval not displayed.  Estimated Creatinine Clearance: 137.1 mL/min (by C-G formula based on Cr of 1).  No results found for: BNP  No results found for: PHART    Microbiology Results Abnormal     None          Imaging Results (last 24 hours)     ** No results found for the last 24 hours. **        Results for orders placed during the hospital encounter of 02/03/18   Adult Transthoracic Echo Complete W/ Cont if Necessary Per Protocol    Narrative · Left ventricular systolic function is normal. Estimated EF = 60%.  · Left ventricular wall thickness is consistent with mild concentric   hypertrophy.  · Left ventricular diastolic dysfunction (grade I) consistent with   impaired relaxation.          I have reviewed the medications.    Assessment/Plan   Assessment / Plan     Hospital Problem List     * (Principal)ACS (acute coronary syndrome)    Hypertension    Coronary artery disease involving native coronary artery of native heart    Overview Addendum 8/24/2016  9:06 AM by ROBBIN Kunz     1.  [facility] 8-22-16  · Single-vessel CAD with total ostial occlusion of the LAD.  · Successful thrombectomy followed by PTCA/stenting of the LAD with 3.5 x 33 mm drug-coated stent reducing the 100% stenosis to 0%.  · No other significant CAD.  · Moderate left ventricular systolic dysfunction, ejection fraction 40-45%.  · Normal hemodynamics.            Dyslipidemia    History of leukemia    Tobacco abuse    Obesity             Brief Hospital Course to date:  Paulie Jordan is a 37 y.o. male  with CAD with history of stent placement, HTN, HLD, and leukemia. He presented to [facility] with chest pain/pressure, found to have elevated trop, but no significant EKG changes. He was transferred here for further evaluation.    Assessment & Plan:  - Stable  - Trop trended down some, still having \"pressures\" but improved  - Management per card, " on hep gtt until Mon, poss need LHC if pain is persistent or EKG changes c/w ischemia  - Nicotine patch, pt counseled about tobacco abuse, cessation advised.  - Pt also counseled on diet/lifestyle modification.    DVT Prophylaxis:  Hep gtt for NSTEMI    CODE STATUS: Full Code    Disposition: Home once medically stable      Anu Smith MD  02/04/18  2:31 PM

## 2018-02-04 NOTE — PLAN OF CARE
Problem: Patient Care Overview (Adult)  Goal: Plan of Care Review  Outcome: Ongoing (interventions implemented as appropriate)   02/04/18 1503   Coping/Psychosocial Response Interventions   Plan Of Care Reviewed With patient   Patient Care Overview   Progress improving   Outcome Evaluation   Outcome Summary/Follow up Plan VSS. NSR on the monitor. Pt doing well this shift. No c/o chest pain today. but he has had arm pain requiring IV morphine to relieve pain.     Goal: Discharge Needs Assessment  Outcome: Ongoing (interventions implemented as appropriate)   02/04/18 1503   Discharge Needs Assessment   Concerns To Be Addressed denies needs/concerns at this time   Readmission Within The Last 30 Days no previous admission in last 30 days   Discharge Disposition still a patient   Current Health   Anticipated Changes Related to Illness none   Self-Care   Equipment Currently Used at Home wheelchair   Living Environment   Transportation Available family or friend will provide       Problem: Acute Coronary Syndrome (ACS) (Adult)  Goal: Signs and Symptoms of Listed Potential Problems Will be Absent or Manageable (Acute Coronary Syndrome)  Outcome: Ongoing (interventions implemented as appropriate)

## 2018-02-04 NOTE — PROGRESS NOTES
[facility] at [facility]    Inpatient Progress Note      Chief Complaint/Reason for service:    · Chest pain         Subjective:       The patient's chest pain is much improved on an increased dose of Imdur and starting Ranexa.  Rated 2 out of 10 at worst overnight.  Resting comfortably today without dyspnea, palpitations, lower external swelling    Past medical, surgical, social and family history reviewed in the patient's electronic medical record.    Review of Systems:   Positive for chest pain  Negative for dyspnea with exertion, lower extremity edema, palpitations     Problem List  Active Hospital Problems (** Indicates Principal Problem)    Diagnosis Date Noted   • **ACS (acute coronary syndrome) [I24.9] 08/22/2016   • History of leukemia [Z85.6] 09/23/2017   • Dyslipidemia [E78.5] 08/23/2016   • Coronary artery disease involving native coronary artery of native heart [I25.10] 08/23/2016   • Hypertension [I10] 08/22/2016      Resolved Hospital Problems    Diagnosis Date Noted Date Resolved   No resolved problems to display.            Objective:      Current Facility-Administered Medications:   •  acetaminophen (TYLENOL) tablet 650 mg, 650 mg, Oral, Q6H PRN, Anu Smith MD, 650 mg at 02/03/18 1649  •  aspirin chewable tablet 81 mg, 81 mg, Oral, Daily, KRISTOPHER Rodriguez, 81 mg at 02/04/18 0826  •  buPROPion XL (WELLBUTRIN XL) 24 hr tablet 300 mg, 300 mg, Oral, Daily, KRISTOPHER Rodriguez, 300 mg at 02/04/18 0829  •  carvedilol (COREG) tablet 6.25 mg, 6.25 mg, Oral, BID With Meals, KRISTOPHER Rodriguez, 6.25 mg at 02/04/18 0826  •  citalopram (CeleXA) tablet 40 mg, 40 mg, Oral, Daily, KRISTOPHER Rodriguez, 40 mg at 02/04/18 0826  •  diazePAM (VALIUM) tablet 10 mg, 10 mg, Oral, Nightly, Anu Smith MD, 10 mg at 02/03/18 2104  •  heparin 95417 units/250 mL (100 units/mL) in 0.45 % NaCl infusion, 12 Units/kg/hr, Intravenous, Titrated, Cyril Ramirez, [facility],  Last Rate: 15.48 mL/hr at 02/04/18 0547, 12 Units/kg/hr at 02/04/18 0547  •  isosorbide mononitrate (IMDUR) 24 hr tablet 90 mg, 90 mg, Oral, Daily, Bryon Triana MD, 90 mg at 02/04/18 0826  •  levothyroxine (SYNTHROID, LEVOTHROID) tablet 50 mcg, 50 mcg, Oral, Daily, KRISTOPHER Rodriguez, 50 mcg at 02/04/18 0546  •  Morphine (MS CONTIN) 12 hr tablet 30 mg, 30 mg, Oral, Q12H, KRISTOPHER Rodriguez, 30 mg at 02/04/18 0824  •  Morphine sulfate (PF) injection 2 mg, 2 mg, Intravenous, Q3H PRN, KRISTOPHER Rodriguez, 2 mg at 02/04/18 0546  •  pantoprazole (PROTONIX) EC tablet 40 mg, 40 mg, Oral, QAM, KRISTOPHER Rodriguez, 40 mg at 02/04/18 0546  •  Pharmacy to Dose Heparin, , Does not apply, Continuous PRN, KRISTOPHER Rodriguez  •  ranolazine (RANEXA) 12 hr tablet 500 mg, 500 mg, Oral, BID, Bryon Triana MD, 500 mg at 02/04/18 0825  •  rosuvastatin (CRESTOR) tablet 40 mg, 40 mg, Oral, Nightly, Anu Smith MD, 40 mg at 02/03/18 2105  •  sodium chloride 0.9 % flush 1-10 mL, 1-10 mL, Intravenous, PRN, KRISTOPHER Rodriguez  •  ticagrelor (BRILINTA) tablet 90 mg, 90 mg, Oral, BID, KRISTOPHER Rodriguez, 90 mg at 02/04/18 0826  •  traZODone (DESYREL) tablet 50 mg, 50 mg, Oral, Nightly PRN, Rocio Mar MD, 50 mg at 02/03/18 2104      Vital Sign Min/Max for last 24 hours  Temp  Min: 97.4 °F (36.3 °C)  Max: 98.1 °F (36.7 °C)   BP  Min: 99/58  Max: 127/87   Pulse  Min: 71  Max: 94   Resp  Min: 16  Max: 20   SpO2  Min: 99 %  Max: 99 %   No Data Recorded      Intake/Output Summary (Last 24 hours) at 02/04/18 1039  Last data filed at 02/03/18 2348   Gross per 24 hour   Intake              600 ml   Output             1475 ml   Net             -875 ml           CONSTITUTIONAL: No acute distress, normal affect  RESPIRATORY: Normal effort. Clear to auscultation bilaterally without wheezing or rales  CARDIOVASCULAR: Regular rate and rhythm with normal S1 and S2. Without murmur, gallop or rub.  PERIPHERAL VASCULAR: Normal  radial pulses bilaterally. There is no peripheral edema bilaterally.    Results Review:   I reviewed the patient's recent labs in the electronic medical record.      Tele:  [facility] 9/2017  · No evidence of hemodynamically significant coronary artery disease.  · Widely patent stent of the LAD.  · Preserved left ventricular systolic function, ejection fraction 50%.  · Normal hemodynamics.     TTE 2/2018  · Left ventricular systolic function is normal. Estimated EF = 60%.  · Left ventricular wall thickness is consistent with mild concentric hypertrophy.  · Left ventricular diastolic dysfunction (grade I) consistent with impaired relaxation.         Assessment/Plan:     Chest Pain  CAD, history NSTEMI stent to prox LAD  -The patient had a similar syndrome in 9/2017.  Repeat heart catheterization at that time revealed a widely patent stent and small vessel disease.  I suspect the patient again is having elevated troponins due to his small vessel disease, vasospasm.  Do not recommend a repeat heart catheterization at the current time.  -Trend downtrending  -TTE pending    -Continue heparin for 48 hours (discontinue tonight at 10pm if chest pain free).  -Continue aspirin, Brilinta, carvedilol, rosuvastatin  -Imdur increased to at 90 mg daily yesterday and started Ranexa 500 mg twice a day    -If the pain recurs and is severe, or his troponin significantly elevates, or there are ECG changes suggestive of active ischemia, will repeat heart catheterization  -Possible discharge home on Monday     Leukemia, in remission  Hypertension  Mixed hyperlipidemia  Chronic noncardiac pain  Poor Memory     We will continue to follow the patient while the patient is in house. Thank you for allowing us to participate in the care of this patient.       Bryon Triana MD, MSc, [facility]  Interventional Cardiology  [facility]  2/4/2018

## 2018-02-05 NOTE — PLAN OF CARE
Problem: Patient Care Overview (Adult)  Goal: Plan of Care Review  Outcome: Ongoing (interventions implemented as appropriate)   02/05/18 0408   Coping/Psychosocial Response Interventions   Plan Of Care Reviewed With patient   Patient Care Overview   Progress improving   Outcome Evaluation   Outcome Summary/Follow up Plan Pt CP free. Heparin gtt stopped at 2200. VSS. NSR.       Problem: Acute Coronary Syndrome (ACS) (Adult)  Goal: Signs and Symptoms of Listed Potential Problems Will be Absent or Manageable (Acute Coronary Syndrome)  Outcome: Ongoing (interventions implemented as appropriate)

## 2018-02-05 NOTE — PROGRESS NOTES
Discharge Planning Assessment  [facility]     Patient Name: Paulie Jordan  MRN: [id number]  Today's Date: 2/5/2018    Admit Date: 2/3/2018          Discharge Needs Assessment       02/05/18 0906    Living Environment    Lives With alone    Living Arrangements house    Home Accessibility no concerns    Stair Railings at Home outside, present on right side    Type of Financial/Environmental Concern none    Transportation Available family or friend will provide;car    Living Environment    Provides Primary Care For no one, unable/limited ability to care for self    Primary Care Provided By parent(s)    Quality Of Family Relationships supportive;helpful;involved    Able to Return to Prior Living Arrangements yes    Discharge Needs Assessment    Concerns To Be Addressed no discharge needs identified    Readmission Within The Last 30 Days no previous admission in last 30 days    Anticipated Changes Related to Illness none    Equipment Currently Used at Home wheelchair;cane, straight;shower chair    Equipment Needed After Discharge none    Discharge Disposition still a patient            Discharge Plan       02/05/18 0907    Case Management/Social Work Plan    Plan Home    Patient/Family In Agreement With Plan yes    Additional Comments Spoke with Mr. Jordan. He lives alone in [place]. His parents live in the home next door. He does not have stairs in his home. He has been wheelchair bound for 18 years. He can take a couple of stairs with the use of a cane. He also has a shower chair. His plan is to return home. No needs anticipated.         Discharge Placement     No information found        Expected Discharge Date and Time     Expected Discharge Date Expected Discharge Time    Feb 6, 2018               Demographic Summary       02/05/18 0903    Referral Information    Admission Type inpatient    Arrived From home or self-care    Referral Source admission list    Reason For Consult discharge planning    Record Reviewed  history and physical    Primary Care Physician Information    Name Cesar Neri            Functional Status       02/05/18 0904    Functional Status Current    Current Functional Level Comment See nursing notes    Change in Functional Status Since Onset of Current Illness/Injury no    Functional Status Prior    Ambulation 1-->assistive equipment    Transferring 1-->assistive equipment    Toileting 1-->assistive equipment    Bathing 1-->assistive equipment    Dressing 1-->assistive equipment    Eating 0-->independent    Communication 0-->understands/communicates without difficulty    Swallowing 0-->swallows foods/liquids without difficulty    IADL    Medications independent    Meal Preparation assistive equipment    Housekeeping assistive equipment and person    Laundry assistive equipment and person    Shopping assistive equipment    Oral Care independent    Activity Tolerance    Usual Activity Tolerance moderate    Current Activity Tolerance moderate    Employment/Financial    Employment/Finance Comments Confirmed with Medicare/[employer] [facility] [employer] [facility]. No complaints regarding medication affordability.             Psychosocial     None            Abuse/Neglect     None            Legal     None            Substance Abuse     None            Patient Forms     None          Alma Rodriguez, RN

## 2018-02-05 NOTE — DISCHARGE SUMMARY
[facility] Medicine Services  DISCHARGE SUMMARY    Patient Name: Paulie Jordan  : 1980  MRN: [id number]    Date of Admission: 2/3/2018  Date of Discharge:  2018  Primary Care Physician: Cesar Neri MD    Consults     Date and Time Order Name Status Description    2/3/2018 0301 Inpatient Consult to Cardiology Completed         Hospital Course     Presenting Problem:   NSTEMI (non-ST elevated myocardial infarction) [I21.4]    Active Hospital Problems (** Indicates Principal Problem)    Diagnosis Date Noted   • Tobacco abuse [Z72.0] 2018   • Obesity [E66.9] 2018   • History of leukemia [Z85.6] 2017   • Dyslipidemia [E78.5] 2016   • Coronary artery disease involving native coronary artery of native heart [I25.10] 2016   • Hypertension [I10] 2016      Resolved Hospital Problems    Diagnosis Date Noted Date Resolved   • **ACS (acute coronary syndrome) [I24.9] 2016          Hospital Course:  Paulie Jordan is a 37 y.o. male  with CAD with history of stent placement, HTN, HLD, and leukemia. He presented to [facility] with chest pain/pressure, found to have elevated trop, but no significant EKG changes. He was transferred here for further evaluation. He was placed on hep gtt x 48 hrs. His trop trended down and still no EKG changes, so LHC not performed. His symptoms improved, but did not completely resolve. Cardiology recommended optimal medical management and his medications were adjusted by them.. He was advised to stop smoking and continue with diet/lifestyle modifications. He will need to F/U with PCP and card after discharge.        Day of Discharge     HPI:   No acute events O/N. No new complaints. Still having arm pain and vague chest pressure that is very mild.    Review of Systems  Otherwise ROS is negative except as mentioned in the HPI.    Vital Signs:   Temp:  [97.2 °F (36.2 °C)-98 °F (36.7 °C)] 97.2 °F (36.2 °C)  Heart  Rate:  [70-83] 83  Resp:  [16] 16  BP: (123-143)/(80-87) 123/85     Physical Exam:    General Assessment: No acute cardiopulmonary distress. Well developed and well nourished.     HEENT: NCAT, PERRL, MM moist     Neck: Supple     CVS: RRR, S1S2 normal, no murmurs     Resp: CTAB, no adventitious sound     Abd: soft, NT, ND, normal BS, no guarding or peritoneal signs     Ext: No edema, both calves are symmetric and NTTP     Neuro: No facial asymmetry, speech clear, strength equal bilat     Skin: W/D/I. No rash.     Psych: Affect is appropriate    Pertinent  and/or Most Recent Results       Results from last 7 days  Lab Units 02/05/18  0531 02/03/18  0456 02/02/18 2148 02/02/18  1848   WBC 10*3/mm3 6.57 7.56 8.37 7.13   HEMOGLOBIN g/dL 13.7 14.3 14.6 15.2   HEMATOCRIT % 41.7 43.1 43.4 46.0   PLATELETS 10*3/mm3 239 217 213 223   SODIUM mmol/L  --  135  --  134*   POTASSIUM mmol/L  --  4.0  --  3.9   CHLORIDE mmol/L  --  104  --  104   CO2 mmol/L  --  25.0  --  23.8*   BUN mg/dL  --  13  --  12   CREATININE mg/dL  --  1.00  --  1.11   GLUCOSE mg/dL  --  87  --  122*   CALCIUM mg/dL  --  8.7  --  8.7       Results from last 7 days  Lab Units 02/04/18  1825 02/04/18  1138 02/04/18  0331 02/03/18 2006 02/03/18  1347 02/03/18  1256 02/03/18  0456 02/02/18  2148  02/02/18  1848   BILIRUBIN mg/dL  --   --   --   --   --   --   --   --   --  0.5   ALK PHOS U/L  --   --   --   --   --   --   --   --   --  126   ALT (SGPT) U/L  --   --   --   --   --   --   --   --   --  34   AST (SGOT) U/L  --   --   --   --   --   --   --   --   --  30   PROTIME Seconds  --   --   --   --   --   --  11.0 13.3  --   --    INR   --   --   --   --   --   --  1.01 1.00  --   --    APTT seconds 77.6* 66.0 54.1* 52.2* 73.2* 100.1* 41.3* 29.6  < >  --    < > = values in this interval not displayed.    Results from last 7 days  Lab Units 02/03/18  0456   CHOLESTEROL mg/dL 189   TRIGLYCERIDES mg/dL 458*   HDL CHOL mg/dL 32*   LDL CHOL mg/dL 128        Results from last 7 days  Lab Units 02/03/18  1715 02/03/18  1045 02/03/18  0456 02/02/18  2047 02/02/18  1848   TSH mIU/mL  --   --  4.607  --   --    HEMOGLOBIN A1C %  --   --  5.80*  --   --    TROPONIN I ng/mL 2.367* 3.274* 2.472* 0.251* 0.059*     Brief Urine Lab Results  (Last result in the past 365 days)      Color   Clarity   Blood   Leuk Est   Nitrite   Protein   CREAT   Urine HCG        02/02/18 1930 Yellow Clear Negative Negative Negative Negative               Microbiology Results Abnormal     None          Imaging Results (all)     None                    Results for orders placed during the hospital encounter of 02/03/18   Adult Transthoracic Echo Complete W/ Cont if Necessary Per Protocol    Narrative · Left ventricular systolic function is normal. Estimated EF = 60%.  · Left ventricular wall thickness is consistent with mild concentric   hypertrophy.  · Left ventricular diastolic dysfunction (grade I) consistent with   impaired relaxation.            Discharge Details      Paulie Jordan   Home Medication Instructions NIMISHA:[id number]    Printed on:02/05/18 1400   Medication Information                      aspirin 81 MG tablet  Take 1 tablet by mouth Daily.             buPROPion XL (WELLBUTRIN XL) 300 MG 24 hr tablet  Take 300 mg by mouth Daily.             carvedilol (COREG) 6.25 MG tablet  Take 6.25 mg by mouth Every 12 (Twelve) Hours.             citalopram (CeleXA) 40 MG tablet  Take 40 mg by mouth daily.             diazePAM (VALIUM) 10 MG tablet  Take 10 mg by mouth Every Night.             febuxostat (ULORIC) 40 MG tablet  Take 80 mg by mouth Daily.             isosorbide mononitrate (IMDUR) 30 MG 24 hr tablet  Take 3 tablets by mouth Daily.             levothyroxine (SYNTHROID, LEVOTHROID) 50 MCG tablet  Take 50 mcg by mouth daily.             morphine (MS CONTIN) 30 MG 12 hr tablet  Take 30 mg by mouth 2 (Two) Times a Day.             nitroglycerin (NITROSTAT) 0.4 MG SL tablet  1  under the tongue as needed for angina, may repeat q5mins for up three doses             olopatadine (PATANOL) 0.1 % ophthalmic solution  Administer 1 drop to both eyes Daily As Needed for Allergies.             Omega 3 1000 MG capsule  Take 1 capsule by mouth 2 (Two) Times a Day.             omeprazole (PriLOSEC) 20 MG capsule  Take 20 mg by mouth daily.             ranolazine (RANEXA) 500 MG 12 hr tablet  Take 1 tablet by mouth 2 (Two) Times a Day.             rosuvastatin (CRESTOR) 20 MG tablet  Take 2 tablets by mouth Daily.             ticagrelor (BRILINTA) 90 MG tablet tablet  Take 1 tablet by mouth 2 (Two) Times a Day.             traZODone (DESYREL) 50 MG tablet  Take 50 mg by mouth At Night As Needed for Sleep.                   Discharge Disposition:  Home or Self Care    Discharge Diet:      Discharge Activity:       Special Instructions:  Future Appointments  Date Time Provider Department Center   3/6/2018 9:15 AM Devorah Hightower MD [facility] None       Additional Instructions for the Follow-ups that You Need to Schedule     Discharge Follow-up with PCP    As directed    Follow Up Details:  1-2 wks           Discharge Follow-up with Specialty: [facility]; 1 Month    As directed    Specialty:  [facility]    Follow Up:  1 Month           Discharge Follow-up with Specialty: cardiology    As directed    Specialty:  cardiology    Follow Up Details:  Please call for appnt                     Time Spent on Discharge:  45 minutes    Anu Smith MD  02/05/18  2:00 PM

## 2018-02-21 NOTE — OUTREACH NOTE
Current Barriers & Concerns:     The main concerns and/or symptoms the patient would like to address are: none at this time.    Education/instruction provided by Care Coordinator: Patient states he has been doing really well since his discharge from the hospital with acute coronary syndrome. Patient denies any further chest pain/discomfort. Patient verbalizes understanding of chest pain interventions and discharge instructions; denies questions or concerns at this time.    Follow Up Outreach Due: PRN

## 2018-03-15 PROBLEM — R07.9 CHEST PAIN: Status: ACTIVE | Noted: 2018-01-01

## 2018-03-15 NOTE — ED NOTES
Dr. Hightower, cardiologist at [facility] paged at this time for Iliana Carlos.      Sally Velázquez  03/15/18 1947

## 2018-03-15 NOTE — ED NOTES
Pt resting comfortably in bed, blood pressure coming down. A&O X4. Has no complaints of moment. Bed in lowest position, side rails up X2. Call light within reach. Sister at bedside.      Karlie Castillo RN  03/15/18 2239

## 2018-03-15 NOTE — ED NOTES
Pt resting comfortably in bed, Vital signs are stable. No complaints of discomfort at moment. Bed in lowest position, side rails up X2. Call light within reach. Family at bedside.      Karlie Castillo RN  03/15/18 5605

## 2018-03-16 PROBLEM — I21.4 NSTEMI (NON-ST ELEVATED MYOCARDIAL INFARCTION) (HCC): Status: ACTIVE | Noted: 2018-01-01

## 2018-03-16 NOTE — H&P
Hospitalist History and Physical    Patient Identification:  Name: Paulie Jordan  Age/Sex: 37 y.o. male  :  1980  MRN: [id number]         Primary Care Physician: Cesar Neri MD    Chief Complaint   Patient presents with   • Chest Pain       History of Present Illness  Patient is a 37 y.o. 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    Patient states that his symptoms began on the day of presentation.  He states that he had gone out in his truck to get a pizza and states that when he pulled back into his garage he experienced pain in his arms.  The patient states that the pain was deep and achy and located in the bilateral triceps area.    The patient states that shortly thereafter he developed severe chest tightness that was located in the center of his chest without radiation.  He also complained of shortness of breath.  He denied nausea, vomiting and or diaphoresis. He took 4 SL NTG tablets without relief.  The patient states that these symptoms are similar to but not as severe as symptoms he has had previously.  The patient states that he currently is chest pain-free but does report that his arms still ache.  He states that the intensity has improved but the pain has not completely resolved.      Patient denies any other exacerbating factor that may be responsible for the pain in his arms.  He denies any recent trauma/injury and denies any recent heavy lifting.    In the emergency department, the patient received Nitropaste, plavix, IV morphine and was eventually placed on a nitroglycerin and heparin drip; ASA was given via EMS. He has been admitted  to the progressive care unit for further evaluation and management.     Present during visit: HONEY Quezada    Past History:  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     Past Surgical History:   Procedure Laterality Date   • BONE MARROW TRANSPLANT     • CARDIAC CATHETERIZATION N/A 8/22/2016    Procedure: Left Heart Cath;  Surgeon: Devorah Hightower MD;  Location:  [facility];  Service:    • CARDIAC CATHETERIZATION N/A 9/23/2017    Procedure: Left Heart Cath;  Surgeon: Devorah Hightower MD;  Location:  [facility];  Service:    • CORONARY STENT PLACEMENT       Family History   Problem Relation Age of Onset   • Hypertension Father    • Cancer Father      Colon   • Arthritis Mother    • No Known Problems Sister      Social History   Substance Use Topics   • Smoking status: Never Smoker   • Smokeless tobacco: Current User     Types: Chew   • Alcohol use No     Prescriptions Prior to Admission   Medication Sig Dispense Refill Last Dose   • aspirin 81 MG tablet Take 1 tablet by mouth Daily. 30 tablet 11 3/15/2018 at 0700   • buPROPion XL (WELLBUTRIN XL) 300 MG 24 hr tablet Take 300 mg by mouth Daily.   3/15/2018 at 0700   • carvedilol (COREG) 6.25 MG tablet Take 6.25 mg by mouth 2 (Two) Times a Day.   3/15/2018 at 0700   • citalopram (CeleXA) 40 MG tablet Take 40 mg by mouth daily.   3/15/2018 at 0700   • diazePAM (VALIUM) 10 MG tablet Take 10 mg by mouth At Night As Needed for Sedation.   Past Week at Unknown time   • febuxostat (ULORIC) 40 MG tablet Take 80 mg by mouth Daily.   3/15/2018 at 0700   • isosorbide mononitrate (IMDUR) 30 MG 24 hr tablet Take 3 tablets by mouth Daily. 90 tablet 3 3/15/2018 at 0700   • levothyroxine (SYNTHROID, LEVOTHROID) 50 MCG tablet Take 50 mcg by mouth daily.   3/15/2018 at 0700   • morphine (MS CONTIN) 30 MG 12 hr tablet Take 30 mg by mouth 2  (Two) Times a Day.   3/15/2018 at 0700   • olopatadine (PATANOL) 0.1 % ophthalmic solution Administer 1 drop to both eyes Daily As Needed for Allergies.   3/15/2018 at 0700   • omeprazole (PriLOSEC) 20 MG capsule Take 20 mg by mouth daily.   3/15/2018 at 0700   • ranolazine (RANEXA) 500 MG 12 hr tablet Take 1 tablet by mouth 2 (Two) Times a Day. 60 tablet 3 3/15/2018 at 0700   • rosuvastatin (CRESTOR) 20 MG tablet Take 2 tablets by mouth Daily. 30 tablet 11 3/15/2018 at 0700   • ticagrelor (BRILINTA) 90 MG tablet tablet Take 1 tablet by mouth 2 (Two) Times a Day. 60 tablet 11 3/15/2018 at 0700   • traZODone (DESYREL) 50 MG tablet Take 50 mg by mouth At Night As Needed for Sleep.   Past Week at Unknown time   • nitroglycerin (NITROSTAT) 0.4 MG SL tablet 1 under the tongue as needed for angina, may repeat q5mins for up three doses (Patient taking differently: Place 0.4 mg under the tongue Every 5 (Five) Minutes As Needed. 1 under the tongue as needed for angina, may repeat q5mins for up three doses) 100 tablet 3 Unknown at Unknown time     Allergies: Benadryl [diphenhydramine]    Review of Systems:  Review of Systems   Constitutional: Negative for chills, diaphoresis and fever.   HENT: Negative for hearing loss, tinnitus and trouble swallowing.    Eyes: Negative for discharge and visual disturbance.   Respiratory: Positive for shortness of breath. Negative for cough and wheezing.    Cardiovascular: Positive for chest pain. Negative for palpitations and leg swelling.   Gastrointestinal: Negative for abdominal pain, constipation, diarrhea, nausea and vomiting.   Endocrine: Negative for polydipsia, polyphagia and polyuria.   Genitourinary: Negative for dysuria, frequency and hematuria.   Musculoskeletal: Positive for myalgias. Negative for gait problem and neck pain.   Skin: Negative for rash and wound.   Neurological: Negative for dizziness, tremors, seizures, syncope, weakness and light-headedness.   Hematological:  Does not bruise/bleed easily.   Psychiatric/Behavioral: Negative for confusion, hallucinations and suicidal ideas.      Vital Signs  Temp:  [97.4 °F (36.3 °C)-98.2 °F (36.8 °C)] 98.2 °F (36.8 °C)  Heart Rate:  [] 110  Resp:  [16-18] 18  BP: (108-141)/() 126/85  Body mass index is 38.29 kg/m².    Physical Exam:  Physical Exam   Constitutional: He is oriented to person, place, and time. He appears well-developed and well-nourished. No distress.   HENT:   Head: Normocephalic and atraumatic.   Mouth/Throat: Oropharynx is clear and moist.   Eyes: Conjunctivae and EOM are normal. Pupils are equal, round, and reactive to light.   Neck: Neck supple. No tracheal deviation present. No thyromegaly present.   Cardiovascular: Normal rate and regular rhythm.  Exam reveals no gallop and no friction rub.    No murmur heard.  Pulmonary/Chest: Breath sounds normal. No respiratory distress. He has no wheezes. He has no rales.   Abdominal: Soft. Bowel sounds are normal. He exhibits no distension. There is no tenderness. There is no guarding.   Musculoskeletal: Normal range of motion. He exhibits no edema.   Neurological: He is alert and oriented to person, place, and time. No cranial nerve deficit.   Skin: Skin is warm and dry. No rash noted. No erythema.   Psychiatric: He has a normal mood and affect.      Results Review:      Results from last 7 days  Lab Units 03/15/18  1618   WBC 10*3/mm3 11.27   HEMOGLOBIN g/dL 13.9*   HEMATOCRIT % 41.6*   PLATELETS 10*3/mm3 218       Results from last 7 days  Lab Units 03/15/18  1618   SODIUM mmol/L 137   POTASSIUM mmol/L 3.5   CHLORIDE mmol/L 103   CO2 mmol/L 26.4   BUN mg/dL 14   CREATININE mg/dL 1.03   CALCIUM mg/dL 9.2   GLUCOSE mg/dL 136*       Results from last 7 days  Lab Units 03/15/18  1618   BILIRUBIN mg/dL 0.4   ALK PHOS U/L 112   AST (SGOT) U/L 23   ALT (SGPT) U/L 33       Results from last 7 days  Lab Units 03/15/18  2239 03/15/18  1856 03/15/18  1618   CK TOTAL U/L 132   --   --    TROPONIN I ng/mL 1.815* 0.617* 0.045*   CK MB INDEX % 6.4*  --   --    MYOGLOBIN ng/mL 128.0*  --   --        Results from last 7 days  Lab Units 03/15/18  1618   INR  1.00       Imaging:    I have personally reviewed the EKG. Per my view, NSR with no acute ST-T changes    Imaging Results (most recent)     Procedure Component Value Units Date/Time    XR Chest 1 View [[id number]] Updated:  03/15/18 1628      *Official report pending; per my view: no acute infiltrates/effusions    Assessment/Plan     -Chest pain/ACS  -NSTEMI  -Essential hypertension, controlled  -Hyperlipidemia  -Chemotherapy induced neuropathy    Admit to the progressive care unit.  Nothing by mouth after midnight.  Continue heparin and nitroglycerin drip.  Continue to trend his cardiac isoenzymes.  Obtain serial EKGs.  Obtain a lipid panel and hemoglobin A1c level in the morning.  He'll receive 80 mg of Lipitor (crestor not on formulary). Continue his beta blocker, aspirin, Brilinta and ranolazine. Repeat his cbc and bmp in the morning. He has been started on gentle IVF hydration, NS @ 50 ml/hr.     Patient may be transferred to [facility]'s [facility] in the morning under the care of Dr. Hightower.     Resume the majority of the patient's home medications.    DVT prophylaxis: Heparin drip    Estimated Length of Stay: < 2 MNs    I discussed the patients findings and my recommendations with patient and nursing staff      Starr Crook DO  03/16/18  12:11 AM

## 2018-03-16 NOTE — PROGRESS NOTES
Discharge Planning Assessment   [facility]     Patient Name: Paulie Jordan  MRN: [id number]  Today's Date: 3/16/2018    Admit Date: 3/15/2018      Discharge Plan     Row Name 03/16/18 1217       Plan    Final Discharge Disposition Code 02 - short term hospital for  care    Final Note Pt discharged to [facility] on this date.           Jeannie Curtis

## 2018-03-16 NOTE — ED NOTES
Pt changed into gown at this time and updated on plan of care and plan to admit in PCU till tomor and then pt will be transferred to [facility], pt verbalizes understanding.  Pt denies needs or concerns at this time, WCKELY.     Amy Blair RN  03/15/18 7838

## 2018-03-16 NOTE — SIGNIFICANT NOTE
[facility] called for transport to [facility].  No ALS available.  Will call at 0700 to recheck availability.

## 2018-03-16 NOTE — SIGNIFICANT NOTE
Return call from [facility] received, report given to brody.  Bed 3596 in CVOU reserved for pt.

## 2018-03-16 NOTE — NURSING NOTE
ACC REVIEW REPORT: [facility]        PATIENT NAME: Paulie Jordan    PATIENT ID: [id number]    BED: 9801     BED TYPE: [facility]    BED GIVEN TO: SEGUNDO SPENCE RN    TIME BED GIVEN: 0545    YOB: 1980    AGE: 37    GENDER: MALE    PREVIOUS ADMIT TO [facility]: YES    PREVIOUS ADMISSION DATE: 2/3/18    PATIENT CLASS: INPATIENT    TODAY'S DATE: 3/16/2018    TRANSFER DATE: 3/16/18    ETA: 0830    TRANSFERRING FACILITY: [facility]    TRANSFERRING FACILITY PHONE # : [phone]    TRANSFERRING MD: OCTAVIO    DATE/TIME REQUEST RECEIVED: 3/15/18 AT 1954    [facility] RN: VOLODYMYR GA RN    REPORT FROM: SEGUNDO SPENCE RN    TIME REPORT TAKEN: 0545    DIAGNOSIS: NSTEMI    REASON FOR TRANSFER TO [facility]: HIGHER LEVEL OF CARE    TRANSPORTATION: AMBULANCE    CLINICAL REASON FOR TRANSFER TO [facility]: PATIENT CAME TO ER WITH C/O CP SOA AND PAIN TO THE BACK SIDE OF HIS UPPER ARMS BILATERALLY.  CURRENTLY PATIENT IS CHEST PAIN FREE BUT HAS BEEN HAVING PAIN IN HIS ARMS.      CLINICAL INFORMATION    HEIGHT: 72 INCHES    WEIGHT: 128 KG    ALLERGIES: ISI WRIGHT: NO    INFECTIOUS DISEASE: NO    ISOLATION: N/A    LAST VITAL SIGNS:  TIME: 0530  TEMP: 98.1  PULSE: 63  B/P: 116/83  RESP: 20    LAB INFORMATION:   WBC 4.50 - 12.50 10*3/mm3 8.37    RBC 4.70 - 6.10 10*6/mm3 4.37     Hemoglobin 14.0 - 18.0 g/dL 13.7     Hematocrit 42.0 - 52.0 % 40.6     MCV 80.0 - 94.0 fL 92.9    MCH 27.0 - 33.0 pg 31.4    MCHC 33.0 - 37.0 g/dL 33.7    RDW 11.5 - 14.5 % 12.9    RDW-SD 37.0 - 54.0 fl 42.3    MPV 6.0 - 10.0 fL 9.3    Platelets 130 - 400 10*3/mm3 198    Neutrophil % 30.0 - 70.0 % 47.3    Lymphocyte % 21.0 - 51.0 % 37.6    Monocyte % 0.0 - 10.0 % 9.8    Eosinophil % 0.0 - 5.0 % 4.4    Basophil % 0.0 - 2.0 % 0.7    Immature Grans % 0.0 - 0.5 % 0.2    Neutrophils, Absolute 1.40 - 6.50 10*3/mm3 3.95    Lymphocytes, Absolute 1.00 - 3.00 10*3/mm3 3.15     Monocytes, Absolute 0.10 - 0.90 10*3/mm3 0.82    Eosinophils, Absolute 0.00 - 0.70  10*3/mm3 0.37    Basophils, Absolute 0.00 - 0.30 10*3/mm3 0.06    Immature Grans, Absolute 0.00 - 0.03 10*3/mm3 0.02      Glucose 70 - 110 mg/dL 136     BUN 7 - 21 mg/dL 14    Creatinine 0.43 - 1.29 mg/dL 1.03    Sodium 135 - 153 mmol/L 137    Potassium 3.5 - 5.3 mmol/L 3.5    Chloride 99 - 112 mmol/L 103    CO2 24.3 - 31.9 mmol/L 26.4    Calcium 7.7 - 10.0 mg/dL 9.2    Total Protein 6.0 - 8.0 g/dL 6.4    Albumin 3.50 - 5.00 g/dL 3.90    ALT (SGPT) 10 - 44 U/L 33    AST (SGOT) 10 - 34 U/L 23    Alkaline Phosphatase 40 - 129 U/L 112    Comments: Note New Reference Ranges   Total Bilirubin 0.2 - 1.8 mg/dL 0.4    eGFR Non African Amer >60 mL/min/1.73 80    Globulin gm/dL 2.5    A/G Ratio 1.5 - 2.5 g/dL 1.6    BUN/Creatinine Ratio 7.0 - 25.0 13.6    Anion Gap 3.6 - 11.2 mmol/L 7.6        MEDS/IV FLUIDS: #20 LAC #22 R SHOULDER NITRO GTT 10 MCG, HEPARIN GTT 10 ML/HR, NS@ 100 ML/HR, HAS HAD MROPHINE X 2, AND 0.5 MG DILAUDID IV      CARDIAC SYSTEM:    CHEST PAIN: YES    RATE: 0    SCALE: 1/10    RHYTHM: NSR    Is patient taking or has patient been given any drugs that could increase bleeding? YES  (Plavix, Brilinta, Effient, Eliquis, Xarelto, Warfarin, Integrilin, Angiomax)    DRUG: BRILINTA     DOSE/FREQUENCY: 90 MG BID    CARDIAC ENZYMES:    DATE: 3/15/18  TIME:2239  CK: 132  CKMB: 8.49  RITA:128  TROP: 1.815    DATE: 3/16/18  TIME:0345  CK: 132  CKMB 11.03  RITA:104  TROP: 2.795        RESPIRATORY SYSTEM:    LUNG SOUNDS:    CLEAR: Y  OXYGEN: NO    O2 SAT: 100    ADMINISTRATION ROUTE: N/A    IMAGING FINDINGS: SEE EPIC    CNS/MUSCULOSKELETAL    ALERT AND ORIENTED:    PERSON: Y  PLACE: Y  TIME: Y    INJURY:  WHERE: NO    ELIECER COMA SCALE:    E: 4  M: 6  V: 5    STROKE SCALE: N/A    SIZE OF HEMORRHAGE: N/A    CNS/MUSCULOSKELETAL NOTES: PATIENT HAS SEVERE NEUROPATHY WITH FOOT DROP AND USES WHEELCHAIR BUT CAN WALK A FEW STEPS.  THIS IS RESULT OF CHEMOTHERAPY      GI//GY      ABDOMINAL PAIN: N    VOMITING: N    DIARRHEA:  N    NAUSEA: N    BOWEL SOUNDS: ACTIVE    OCCULT STOOL: UNKNOWN    VAGINAL BLEEDING: N/A    TESTICULAR PAIN: NO    HEMATURIA: N/A    PAST MEDICAL HISTORY: CAD, STENT, HTN, HLP, SEVERE NEUROPATHY, LEUKEMIA-2000     Madelyn Rice RN  3/16/2018  5:37 AM

## 2018-03-16 NOTE — H&P
Pre-cardiac Catheterization History and Physical  [facility] at [facility]      Patient:  Paulie Jordan  :  1980  MRN: [id number]    PCP:  Cesar Neri MD  PHONE:  [phone]    DATE: 3/16/2018  ID: Paulie Jordan is a 37 y.o. male resident of [place], [place]     CC: NSTEMI    PROBLEM LIST:   1. Coronary artery disease:  1. NSTEMI 2016   2. Cardiac catheterization 2016: Single vessel- CAD with total ostial occlusion of LAD. PTCA/stent of LAD with 3.5 x 33 mm ALISHA reducing 100% stenosis to 0%. EF 40-45%   3. Echocardiogram 2016: EF 55%. Trace MRPankaj Trace TR.  4. NSTEMI 2017: LHC demonstrating normal EF, widely patent LAD stent, no obstructive disease   5. Recurrent NSTEMI 2018: treated medically due to recent LHC demonstrating no obstructive disease, felt to be related to small vessel disease vs coronary spasm   6. Echo 2/3/2018: EF 60%  7. NSTEMI 2018 (with transient 1mm FRITZ inferior leads prior to [facility] arrival)   2. Hypertension   3. Hyperlipidemia   4. Neuropathy, secondary to Chemotherapy   5. History of Leukemia  1. Diagnosed  with remission , S/P chemo and radiation  2. S/P Bone Marrow Transplant   3. Wheelchair bound due to spinal involvement  6. Hypothyroid, on chronic replacement therapy.  7. IBS   8. Depression  9. Chewing tobacco use     BRIEF HPI: Mr. Jordan is a pleasant 36 y/o WM with history as noted above who is transferred from [facility] for NSTEMI. He began having symptoms of midsternal chest tightness with radiation to bilateral arms yesterday while at rest. He took nitro x4 at home with no relief and then called EMS services. He was taken to [facility] where he ruled in for NSTEMI and was subsequently transferred to our facility. His presenting EKG showed 1mm ST elevation in II, III and aVF, which is different than prior EKG, and this resolved on subsequent EKG's. He presented with similar symptoms last fall when a heart cath  did not demonstrate any obstructive disease. He had a NSTEMI again in February, which was treated medically and felt to be related to small vessel disease vs coronary spasm. He is currently on Heparin gtt and Nitro gtt and reports his arm pain has been constant since yesterday, however his chest tightness has eased off. He is sedentary due to lower extremity weakness and neuropathy secondary to history of leukemia and chemo. He states he has been compliant with his medicines, which include aspirin and Brillinta. He chews tobacco, no alcohol or drug use. No significant family history.     Cardiac Risk Factors: known CAD,  dyslipidemia, hypertension, male gender, obesity (BMI >= 30 kg/m2), sedentary lifestyle and smoking/ tobacco exposure    Allergies:      Allergies   Allergen Reactions   • Benadryl [Diphenhydramine] Itching     IV benadryl per patient       MEDICATIONS:  Current Outpatient Prescriptions on File Prior to Encounter   Medication Sig   • aspirin 81 MG tablet Take 1 tablet by mouth Daily.   • atorvastatin (LIPITOR) 80 MG tablet Take 1 tablet by mouth Every Night.   • buPROPion XL (WELLBUTRIN XL) 300 MG 24 hr tablet Take 300 mg by mouth Daily.   • carvedilol (COREG) 6.25 MG tablet Take 6.25 mg by mouth 2 (Two) Times a Day.   • citalopram (CeleXA) 40 MG tablet Take 40 mg by mouth daily.   • diazePAM (VALIUM) 10 MG tablet Take 10 mg by mouth At Night As Needed for Sedation.   • levothyroxine (SYNTHROID, LEVOTHROID) 50 MCG tablet Take 50 mcg by mouth daily.   • olopatadine (PATANOL) 0.1 % ophthalmic solution Administer 1 drop to both eyes Daily As Needed for Allergies.   • omeprazole (PriLOSEC) 20 MG capsule Take 20 mg by mouth daily.   • ranolazine (RANEXA) 500 MG 12 hr tablet Take 1 tablet by mouth 2 (Two) Times a Day.   • ticagrelor (BRILINTA) 90 MG tablet tablet Take 1 tablet by mouth 2 (Two) Times a Day.   • heparin, porcine, 100-0.45 UNIT/ML-% infusion Infuse 1,000 Units/hr into a venous catheter Dose  "Adjusted By Provider As Needed.   • morphine (MS CONTIN) 30 MG 12 hr tablet Take 30 mg by mouth 2 (Two) Times a Day.       Past medical & surgical history, social and family history reviewed in the electronic medical record.    ROS: Pertinent positives listed in the HPI and problem list above. All others reviewed and negative.     Physical Exam:   /91 (BP Location: Left arm, Patient Position: Lying) Comment: 160/114 RIGHT  Pulse 66   Temp 98.7 °F (37.1 °C) (Temporal Artery )   Resp 16   Ht 182.9 cm (72\")   Wt 130 kg (287 lb 0.6 oz)   SpO2 100%   BMI 38.93 kg/m²     Constitutional:    Alert, cooperative, in no acute distress   Neck:     No Jugular venous distention, adenopathy, or thyromegaly noted.     Heart:    Regular rhythm and normal rate, normal S1 and S2, no murmurs,gallops, rubs, or clicks. No distinct PMI noted.    Lungs:     Clear to auscultation bilaterally, respirations regular, even     and unlabored    Abdomen:     Obese, non-tender, non-distended, normal bowel sounds, no masses or organomegaly   Extremities:   No gross deformities, bilateral ankle edema, no clubbing, or cyanosis.    Pulses:   Peripheral pulses not palpable but PT pulses present by doppler.      Labs and Diagnostic Data:    Results from last 7 days  Lab Units 03/16/18  0345   SODIUM mmol/L 137   POTASSIUM mmol/L 3.6   CHLORIDE mmol/L 105   CO2 mmol/L 25.7   BUN mg/dL 15   CREATININE mg/dL 0.98   GLUCOSE mg/dL 102   CALCIUM mg/dL 8.3       Results from last 7 days  Lab Units 03/16/18  0345   WBC 10*3/mm3 8.37   HEMOGLOBIN g/dL 13.7*   HEMATOCRIT % 40.6*   PLATELETS 10*3/mm3 198     Lab Results   Component Value Date    CHOL 163 03/15/2018    TRIG 312 (H) 03/15/2018    HDL 31 (L) 03/15/2018    LDL 70 03/15/2018    AST 27 03/16/2018    ALT 31 03/16/2018       Results from last 7 days  Lab Units 03/16/18  0345   HEMOGLOBIN A1C % 6.10*           Results from last 7 days  Lab Units 03/16/18  0345 03/15/18  1618   PROTIME Seconds  " --  13.3   INR   --  1.00   APTT seconds 64.2* 28.8       EKG: pending    IMPRESSION:  · 36 y/o WM with history of CAD and 2 recent NSTEMIs within the past 6 months, presenting with recurring NSTEMI and symptoms of chest and arm pain. Presenting EKG shows minimal 1mm ST elevation in inferior leads which has now resolved. He will undergo catheterization studies with intervention standby. He is on ASA and Brillinta, and currently heparin and nitro drips.     PLAN:  · Procedure to perform: LHC +/- CBI. Risks, benefits and alternatives to the procedure explained to the patient and he understands and wishes to proceed.     IBryon MD, personally performed the services described as documented by the above named individual. I have made any necessary edits and it is both accurate and complete 3/16/2018  8:42 PM    Bryon MADSEN MD, personally performed the services described as documented by the above named individual. I have made any necessary edits and it is both accurate and complete 3/16/2018  4:22 PM    Scribed for Bryon Munguia MD by Edna Ibanez PA-C. 3/16/2018  12:24 PM

## 2018-03-16 NOTE — ED NOTES
Provider at bedside at this time discussing results and plan of care, all questions and concerns addressed, pt verbalizes understanding.     Amy Blair RN  03/15/18 9336

## 2018-03-16 NOTE — PLAN OF CARE
Problem: Patient Care Overview  Goal: Plan of Care Review   03/16/18 0435   Coping/Psychosocial   Plan of Care Reviewed With patient       Problem: Fall Risk (Adult)  Goal: Identify Related Risk Factors and Signs and Symptoms   03/16/18 0435   Fall Risk (Adult)   Related Risk Factors (Fall Risk) fatigue/slow reaction;gait/mobility problems   Signs and Symptoms (Fall Risk) presence of risk factors     Goal: Absence of Fall   03/16/18 0435   Fall Risk (Adult)   Absence of Fall making progress toward outcome

## 2018-03-17 NOTE — PLAN OF CARE
Problem: Patient Care Overview  Goal: Plan of Care Review  Outcome: Ongoing (interventions implemented as appropriate)   03/17/18 7911   Coping/Psychosocial   Plan of Care Reviewed With patient   Plan of Care Review   Progress no change   OTHER   Outcome Summary VSS. No complaints. Lradial site clean, dry and intact. NSR on montior.

## 2018-03-17 NOTE — PLAN OF CARE
Problem: Patient Care Overview  Goal: Plan of Care Review  Outcome: Ongoing (interventions implemented as appropriate)   03/17/18 7459   Coping/Psychosocial   Plan of Care Reviewed With patient   Plan of Care Review   Progress improving   OTHER   Outcome Summary VSS. No complaints. L radial site clean dry and intact. NSR on tele.

## 2018-03-18 NOTE — PLAN OF CARE
Problem: Patient Care Overview  Goal: Plan of Care Review  Outcome: Outcome(s) achieved Date Met: 03/18/18 03/18/18 1016   Coping/Psychosocial   Plan of Care Reviewed With patient   OTHER   Outcome Summary pt. being discharge home today     Goal: Individualization and Mutuality  Outcome: Outcome(s) achieved Date Met: 03/18/18    Goal: Interprofessional Rounds/Family Conf  Outcome: Outcome(s) achieved Date Met: 03/18/18      Problem: Skin Injury Risk (Adult)  Goal: Skin Health and Integrity  Outcome: Outcome(s) achieved Date Met: 03/18/18

## 2018-03-18 NOTE — PLAN OF CARE
Problem: Patient Care Overview  Goal: Plan of Care Review  Outcome: Ongoing (interventions implemented as appropriate)   03/18/18 0525   Coping/Psychosocial   Plan of Care Reviewed With patient   Plan of Care Review   Progress improving   OTHER   Outcome Summary VSS. L radial site open to air. NSR on tele w/ PVC's. No complaints.       Problem: Skin Injury Risk (Adult)  Goal: Identify Related Risk Factors and Signs and Symptoms  Outcome: Ongoing (interventions implemented as appropriate)   03/18/18 0525   Skin Injury Risk (Adult)   Related Risk Factors (Skin Injury Risk) mobility impaired

## 2018-03-18 NOTE — PROGRESS NOTES
"[facility] Discharge Note       LOS: 2 days   Patient Care Team:  Cesar Neri MD as PCP - General  Cesar Neri MD as PCP - Family Medicine  Cesar Neri MD as PCP - Claims Attributed  Ernestine Barajas RN as Care Coordinator (Population Health)    Chief Complaint: Chest pain    Subjective     Subjective: No chest pain.  Feels good.  Anxious to go home.    Review of Systems:   As above.    Medications:    aspirin 81 mg Oral Daily   atorvastatin 80 mg Oral Nightly   buPROPion  mg Oral Daily   carvedilol 6.25 mg Oral BID   citalopram 40 mg Oral Daily   ketotifen 1 drop Both Eyes BID   levothyroxine 50 mcg Oral Daily   Morphine 30 mg Oral Q12H   pantoprazole 40 mg Oral QAM   ticagrelor 90 mg Oral BID       Objective     Vital Sign Min/Max for last 24 hours  Temp  Min: 97.9 °F (36.6 °C)  Max: 98.8 °F (37.1 °C)   BP  Min: 104/77  Max: 116/77   Pulse  Min: 69  Max: 82   Resp  Min: 20  Max: 20   No Data Recorded   No Data Recorded   No Data Recorded      Intake/Output Summary (Last 24 hours) at 03/18/18 0637  Last data filed at 03/18/18 0309   Gross per 24 hour   Intake             1825 ml   Output             1400 ml   Net              425 ml        Flowsheet Rows    Flowsheet Row First Filed Value   Admission Height 182.9 cm (72\") Documented at 03/16/2018 1135   Admission Weight 130 kg (287 lb 0.6 oz) Documented at 03/16/2018 1135          Physical Exam:    General: Alert and oriented  Cardiovascular: Heart has a nondisplaced focal PMI. Regular rate and rhythm without murmur, gallop or rub.  Lungs: Clear without rales or wheezes. Equal expansion is noted.   Extremities: Show no edema.  Skin: warm and dry.  Neurologic: nonfocal       Results Review:    I reviewed the patient's new clinical results.    Tele:  NSR @ 77    Labs:      Results from last 7 days  Lab Units 03/17/18  0431 03/16/18  0345 03/15/18  1618   SODIUM mmol/L 136 137 137   POTASSIUM mmol/L 4.2 3.6 3.5   CHLORIDE mmol/L 104 105 " 103   CO2 mmol/L 24.0 25.7 26.4   BUN mg/dL 12 15 14   CREATININE mg/dL 1.20 0.98 1.03   CALCIUM mg/dL 8.4* 8.3 9.2   BILIRUBIN mg/dL  --  0.3 0.4   ALK PHOS U/L  --  117 112   ALT (SGPT) U/L  --  31 33   AST (SGOT) U/L  --  27 23   GLUCOSE mg/dL 126* 102 136*       Results from last 7 days  Lab Units 03/17/18  0431 03/16/18  0345 03/15/18  1618   WBC 10*3/mm3 7.90 8.37 11.27   HEMOGLOBIN g/dL 13.0* 13.7* 13.9*   HEMATOCRIT % 38.8* 40.6* 41.6*   PLATELETS 10*3/mm3 190 198 218     Lab Results   Component Value Date    TROPONINI 1.940 (C) 03/16/2018    TROPONINI 2.795 (C) 03/16/2018    TROPONINI 1.815 (C) 03/15/2018     Lab Results   Component Value Date    CHOL 163 03/15/2018    CHOL 189 02/03/2018    CHOL 185 09/22/2017     Lab Results   Component Value Date    TRIG 312 (H) 03/15/2018    TRIG 458 (H) 02/03/2018    TRIG 372 (H) 09/22/2017     Lab Results   Component Value Date    HDL 31 (L) 03/15/2018    HDL 32 (L) 02/03/2018    HDL 30 (L) 09/22/2017     No components found for: LDLCALC  Lab Results   Component Value Date    INR 1.06 03/16/2018    INR 1.00 03/15/2018    INR 1.01 02/03/2018    PROTIME 11.1 03/16/2018    PROTIME 13.3 03/15/2018    PROTIME 11.0 02/03/2018         Ejection Fraction:    Assessment   Assessment:  1. Coronary artery disease:  1. NSTEMI 8/22/2016   2. Cardiac catheterization 8/22/2016: Single vessel- CAD with total ostial occlusion of LAD. PTCA/stent of LAD with 3.5 x 33 mm ALISHA reducing 100% stenosis to 0%. EF 40-45%   3. Echocardiogram 8/23/2016: EF 55%. Trace MR. Trace TR.  4. NSTEMI 09/2017: [facility] demonstrating normal EF, widely patent LAD stent, no obstructive disease   5. Recurrent NSTEMI 02/2018: treated medically due to recent LHC demonstrating no obstructive disease, felt to be related to small vessel disease vs coronary spasm   6. Echo 2/3/2018: EF 60%  7. NSTEMI 03/2018 (with transient 1mm FRITZ inferior leads prior to [facility] arrival) iFR/OCT-guided ALISHA in ostium ramus  intermedians  2. Hypertension   3. Hyperlipidemia   4. Neuropathy, secondary to Chemotherapy   5. History of Leukemia  1. Diagnosed 1998 with remission 2000, S/P chemo and radiation  2. S/P Bone Marrow Transplant 2000  3. Wheelchair bound due to spinal involvement  6. Hypothyroid, on chronic replacement therapy.  7. IBS   8. Depression  9. Chewing tobacco use          Plan:    Home today on aspirin 81 mg by mouth daily, atorvastatin 80 mg by mouth daily, Wellbutrin 3000 mg XL daily, Coreg 6.25 mg by mouth twice a day Celexa 40 mg by mouth daily Saturday Ater 0.025% ophthalmic solution one drop to both eyes twice a day Synthroid 50 µg daily, MS Contin 30 mg every 12 hours, Brilinta 90 mg by mouth twice a day,Protonix 40 mg by mouth daily.  As the patient is not having chest discomfort I told him that he no longer needs to take his Ranexa.  Follow-up with Dr. Hightower as scheduled.  The patient is stable, appropriate for discharge home, and follow-up has been arranged.      Luz Elena Kruse, KRISTOPHER  03/18/18  6:37 AM      I Sary Will MD personally performed the services described in this documentation as scribed by the above individual in my presence, and it is both accurate and complete.    Sary Will MD, [facility]

## 2018-03-19 NOTE — PROGRESS NOTES
Order received for Phase II Cardiac Rehab.     Staff to contact patient regarding program information and scheduling.

## 2018-04-27 NOTE — PROGRESS NOTES
We had left messages for Mr desouza on 3/20/18 , and 4/5/18 to call us back concerning Cardiac Rehab. I spoke with him today and he stated he was not interested in the program. I told him if he changed his mind give us a call and we would gladly schedule him

## 2018-05-29 PROBLEM — N17.9 AKI (ACUTE KIDNEY INJURY) (HCC): Status: ACTIVE | Noted: 2018-01-01

## 2018-05-30 PROBLEM — R07.9 CHEST PAIN: Status: RESOLVED | Noted: 2018-01-01 | Resolved: 2018-01-01

## 2018-05-30 PROBLEM — R07.89 ATYPICAL CHEST PAIN: Status: RESOLVED | Noted: 2017-09-22 | Resolved: 2018-01-01

## 2018-06-01 PROBLEM — I25.119 CORONARY ARTERY DISEASE INVOLVING NATIVE CORONARY ARTERY OF NATIVE HEART WITH ANGINA PECTORIS (HCC): Status: ACTIVE | Noted: 2018-01-01

## 2018-06-01 PROBLEM — Z99.3 WHEELCHAIR BOUND: Status: ACTIVE | Noted: 2018-01-01

## 2018-06-01 NOTE — PROGRESS NOTES
"[facility] at [facility]  IP Progress Note   LOS: 3 days   Patient Care Team:  Cesar Neri MD as PCP - General  Cesar Neri MD as PCP - Family Medicine  Cesar Neri MD as PCP - Claims Attributed  Ernestine Barajas, RN as Care Coordinator (Population Health)    Chief Complaint: Follow up for Coronary Artery Disease/acute MI    Subjective    Continue to complain of left arm and left-sided neck pain, states that this has been constant with periods of relief after receiving his Dilaudid, feels that the frequency of Dilaudid needs to be increased.  He is already on long-acting morphine.  Reports that the pain worsens with blood pressure cuff inflating.  No anterior chest pain or shortness of breath.  Has not gotten out of bed or move at all.    Tele: Sinus Rythym     Vitals:  Blood pressure 93/56, pulse 68, temperature 97.9 °F (36.6 °C), temperature source Oral, resp. rate 20, height 182.9 cm (72\"), weight 129 kg (284 lb), SpO2 99 %.     Intake/Output Summary (Last 24 hours) at 06/01/18 0814  Last data filed at 06/01/18 0500   Gross per 24 hour   Intake             3760 ml   Output             1900 ml   Net             1860 ml       Physical Exam:  General: No apparent distress.  Neck: no JVD.  Chest:No respiratory distress, breath sounds are normal. No wheezes,  rhonchi or rales.  Cardiovascular: Normal S1 and S2, no murmer, gallop or rub.    Extremities: No edema.    Results Review:     I reviewed the patient's new clinical results.      Results from last 7 days  Lab Units 06/01/18  0155   WBC 10*3/mm3 8.14   HEMOGLOBIN g/dL 12.3*   HEMATOCRIT % 38.3*   PLATELETS 10*3/mm3 194       Results from last 7 days  Lab Units 05/30/18  1009 05/29/18  0206   SODIUM mmol/L 136 136   POTASSIUM mmol/L 3.8 3.9   CHLORIDE mmol/L 105 106   CO2 mmol/L 24.0 20.0   BUN mg/dL 14 10   CREATININE mg/dL 1.30 1.50*   CALCIUM mg/dL 8.1* 9.1   BILIRUBIN mg/dL  --  0.4   ALK PHOS U/L  --  125*   ALT (SGPT) " U/L  --  35   AST (SGOT) U/L  --  60*   GLUCOSE mg/dL 106* 121*       Scheduled Meds:  aspirin 325 mg Oral Daily   atorvastatin 80 mg Oral Nightly   buPROPion  mg Oral Daily   citalopram 40 mg Oral Daily   febuxostat 40 mg Oral Daily   levothyroxine 50 mcg Oral Q AM   metoprolol tartrate 12.5 mg Oral Q12H   Morphine 30 mg Oral Q12H   pantoprazole 40 mg Oral Q AM   pharmacy consult - MTM  Does not apply Daily   sennosides-docusate sodium 2 tablet Oral Nightly       Continuous Infusions:  heparin (porcine) 10.5 Units/kg/hr Last Rate: 10.5 Units/kg/hr (06/01/18 0516)   nitroglycerin 5-200 mcg/min Last Rate: 5 mcg/min (05/31/18 0841)   Pharmacy to Dose Heparin           Assessment:  1. CAD/ Recurrent NSTEMI, Severe 1 vessel diseaseOf the LAD, pending CABG later this week once platelet function improves off Brilinta., EF 40% by echo cardiac gram.  2. Hypertension  3. Hyperlipidemia,   4. Tobacco abuse  5. High index of suspicion for Medical Noncomliance and narcotic seeking behavior  6.  Noncardiac arm and neck pain.     Plan:  1. Continue statin, beta blockers along with aspirin and heparin and nitroglycerin.    2. CABG on Monday.  3. Increase activities.  Will consult physical therapy.  4. Explained to the patient that his pain is most probably noncardiac may be due to radiculopathy and will likely not improve after CABG, explained that further increasing the dose of Dilaudid is not in his best interest due to high risk of complications in the setting of long-acting in therapy. Afterwards.  5. We will revisit on Monday, please call if there are any questions or concerns.     ROBBIN Talavera  06/01/18  8:14 AM    I have seen and examined the patient, case was discussed with the physician extender, reviewed the above note, necessary changes were made and I agree with the final note.   Devorah Hightower MD, FAC, [facility]

## 2018-06-01 NOTE — PLAN OF CARE
Problem: Patient Care Overview  Goal: Plan of Care Review  Outcome: Ongoing (interventions implemented as appropriate)   05/31/18 1654 06/01/18 1745   Coping/Psychosocial   Plan of Care Reviewed With --  patient   Plan of Care Review   Progress --  no change   OTHER   Outcome Summary VSS. No c.o of chest pain this shirt. Plan for CABG first of next week.  --

## 2018-06-01 NOTE — PROGRESS NOTES
HEPARIN INFUSION  Paulie Jordan is a  37 y.o. male receiving heparin infusion.     Therapy for (VTE/Cardiac):   Cardiac  Patient Weight: 129 kg  Initial Bolus (Y/N):   Yes  Any Bolus (Y/N):   Yes        Signs or Symptoms of Bleeding: None noted  Cardiac or Other (Not VTE)   Initial Bolus: 60 units/kg (Max 4,000 units)  Initial rate: 12 units/kg/hr (Max 1,000 units/hr)   aPTT    (sec) Bolus Dose Stop Infusion Rate Change Repeat aPTT      < 45 60 units/kg 0 hrs Increase rate by   4 units/kg/hr 6 hrs       45 - 54  30 units/kg 0 hrs Increase rate by 2 units/kg/hr 6 hrs    55 - 70 0 0 hrs No change 6 hrs      71 - 83 0  0 hrs Decrease rate by 2 units/kg/hr 6 hrs      > 83 0 Hold 1 hr Decrease rate by 3 units/kg/hr 6 hrs      Results from last 7 days   Lab Units 05/29/18  0206 05/28/18  1505   INR  0.98 --    HEMOGLOBIN g/dL 15.5 15.5   HEMATOCRIT % 46.4 45.6   PLATELETS 10*3/mm3 204 243           Date   Time   aPTT Current Rate (Unit/kg/hr) Bolus   (Units) Rate Change   (Unit/kg/hr) New Rate (Unit/kg/hr) Next aPTT Comments  Pump Check Daily   5/29 0130 pending 0 4000 +7.8 7.8 0800 Alejandra 3239 -acb   5/29 1226 130.5 7.8 Hold 1 hour -2.8 5 1800 Antoinette 3249   5/29 1757 36.4 5 5000 +4 9 0100 D/w Antoinette   5/30 0142 69.7 9 -- -- 9 0800 D/w RN   5/30 0817 59.2 9 -- -- 9 0600 Verified pump  Antoinette 3252   5/31 0336 49.9 9 3000 +2 11 1300 Elizabeth 3184   5/31 1303 66.9 11 -- -- 11 1900 Elizabeth 3184   5/31 1923 70.3 11 -- - 0.5 10.5 0200 Spoke with Karlie   6/1 0345 54.3 10.5 -- -- 10.5 0800 D/w RN   6/1 1202 44.2 10.5 3000 +2 12.5 2000 Verified pump,  Elizabeth 3186

## 2018-06-01 NOTE — PROGRESS NOTES
"Paulie Jordan  [id number]  1980     LOS: 3 days   Patient Care Team:  Cesar Neri MD as PCP - General  Cesar Neri MD as PCP - Family Medicine  Cesar Neri MD as PCP - Claims Attributed  Ernestine Barajas RN as Care Coordinator (Population Health)    Chief Complaint: Coronary artery disease      Subjective: Denies any chest pain, or new shortness of breath or other symptoms    Objective:     Vital Sign Min/Max for last 24 hours  Temp  Min: 97.8 °F (36.6 °C)  Max: 98.5 °F (36.9 °C)   BP  Min: 90/62  Max: 113/68   Pulse  Min: 68  Max: 81   Resp  Min: 14  Max: 20   SpO2  Min: 96 %  Max: 99 %   No Data Recorded   No Data Recorded     Flowsheet Rows      First Filed Value   Admission Height  182.9 cm (72\") Documented at 05/29/2018 0100   Admission Weight  129 kg (284 lb) Documented at 05/29/2018 0100          Physical Exam:No diaphoresis, breathing easily    Wound:    Pulses:     Mediastinal and Chest Tube Drainage:       Results Review:     Results from last 7 days  Lab Units 06/01/18  0155   WBC 10*3/mm3 8.14   HEMOGLOBIN g/dL 12.3*   HEMATOCRIT % 38.3*   PLATELETS 10*3/mm3 194       Results from last 7 days  Lab Units 05/30/18  1009   SODIUM mmol/L 136   POTASSIUM mmol/L 3.8   CHLORIDE mmol/L 105   CO2 mmol/L 24.0   BUN mg/dL 14   CREATININE mg/dL 1.30   GLUCOSE mg/dL 106*   CALCIUM mg/dL 8.1*             Assessment    Principal Problem:    NSTEMI (non-ST elevated myocardial infarction)  Active Problems:    Hypertension    Coronary artery disease involving native coronary artery of native heart    Dyslipidemia    History of leukemia    Neuropathy due to chemotherapeutic drug    Tobacco abuse    Obesity    KENNETH (acute kidney injury)      Carolyne is stable.  P2 Y 12 is 204.  We'll plan coronary bypass surgery Monday tentatively        Ramesh Leggett MD  06/01/18  6:53 AM      Please note that portions of this note were completed with a voice recognition program. Efforts were made to edit the " dictations, but words may be mistranscribed

## 2018-06-01 NOTE — PLAN OF CARE
Problem: Patient Care Overview  Goal: Plan of Care Review   06/01/18 0034   Coping/Psychosocial   Plan of Care Reviewed With patient   Plan of Care Review   Progress no change   OTHER   Outcome Summary VSS. No c.o of chest pain CABG monday. PRN pain meds given around the clock.

## 2018-06-01 NOTE — PROGRESS NOTES
[facility] Medicine Services  PROGRESS NOTE    Patient Name: Paulie Jordan  : 1980  MRN: [id number]    Date of Admission: 2018  Length of Stay: 3  Primary Care Physician: Cesar Neri MD    Subjective   Subjective     CC:  Chest pain    HPI:  Asks for a wheelchair. He uses a wheelchair at home, has not been able to walk since . Still has shoulder pain that goes into his jaw. Declines Voltaren gel. No BM in several days, agreeable to stool softner.      Review of Systems   Gen- No fevers, chills  CV- No chest pain, palpitations  Resp- No cough, dyspnea  GI- No N/V/D, abd pain    Otherwise ROS is negative except as mentioned in the HPI.    Objective   Objective     Vital Signs:   Temp:  [97.6 °F (36.4 °C)-98.3 °F (36.8 °C)] 97.6 °F (36.4 °C)  Heart Rate:  [68-84] 72  Resp:  [14-20] 18  BP: ()/(56-72) 96/63        Physical Exam:  Gen-no acute distress, resting in bed  CV-RRR, S1 S2 normal, no m/r/g  Resp-CTAB, normal WOB  Abd-soft, NT, ND, +BS  Ext-+2 pitting edema, PPP  Neuro-A&Ox3, no focal deficits  Psych-flat affect today    Results Reviewed:  I have personally reviewed current lab, radiology, and data and agree.      Results from last 7 days  Lab Units 18  0155 18  0206 18  1505   WBC 10*3/mm3 8.14 9.66 9.02   HEMOGLOBIN g/dL 12.3* 15.5 15.5   HEMATOCRIT % 38.3* 46.4 45.6   PLATELETS 10*3/mm3 194 204 243   INR   --  0.98  --        Results from last 7 days  Lab Units 18  1009 18  0452 18  0206 18  2231  18  1505   SODIUM mmol/L 136  --  136  --   --  133*   POTASSIUM mmol/L 3.8  --  3.9  --   --  3.8   CHLORIDE mmol/L 105  --  106  --   --  104   CO2 mmol/L 24.0  --  20.0  --   --  22.9*   BUN mg/dL 14  --  10  --   --  12   CREATININE mg/dL 1.30  --  1.50*  --   --  1.52*   GLUCOSE mg/dL 106*  --  121*  --   --  100   CALCIUM mg/dL 8.1*  --  9.1  --   --  8.8   ALT (SGPT) U/L  --   --  35  --   --  34   AST  "(SGOT) U/L  --   --  60*  --   --  33   TROPONIN I ng/mL  --  18.261* 9.126* 6.019*  < > 0.486*   < > = values in this interval not displayed.  Estimated Creatinine Clearance: 106 mL/min (by C-G formula based on SCr of 1.3 mg/dL).  No results found for: BNP    Results for orders placed during the hospital encounter of 05/29/18   Adult Transthoracic Echo Complete W/ Cont if Necessary Per Protocol    Narrative · The study is technically difficult for diagnosis.  · Left ventricular systolic function is mildly decreased. Estimated EF =   40%.  · The following left ventricular wall segments are hypokinetic: mid   anterior, apical anterior, apical inferior, apical septal, apex   hypokinetic and mid anteroseptal.          I have reviewed the medications.    Assessment/Plan   Assessment / Plan     Hospital Problem List     * (Principal)NSTEMI (non-ST elevated myocardial infarction)    Hypertension    Coronary artery disease involving native coronary artery of native heart    Overview Addendum 5/30/2018  9:47 AM by ROBBIN Kunz     1.   NSTEMI 8/22/2016   2. Cardiac catheterization 8/22/2016: Single vessel- CAD with total ostial occlusion of LAD. PTCA/stent of LAD with 3.5 x 33 mm ALISHA reducing 100% stenosis to 0%. EF 40-45%   3. Echocardiogram 8/23/2016: EF 55%. Trace MR. Trace TR.  4. NSTEMI 09/2017: C demonstrating normal EF, widely patent LAD stent, no obstructive disease   5. Recurrent NSTEMI 02/2018: treated medically due to recent LHC demonstrating no obstructive disease, felt to be related to small vessel disease vs coronary spasm   6. Echo 2/3/2018: EF 60%  7. NSTEMI 03/2018 (with transient 1mm FRITZ inferior leads prior to [facility] arrival)   · Cardiac catheterrization  3/16/18 Dr Munguia:  Anteroapical hypokinesia and left ventricular ejection fraction of 54%,  three-vessel nonobstructive coronary disease and a widely patent proximal LAD stent. Despite a \"borderline normal\" iFR, a \"culprit\" lesion in the ostium " "of his Ramus Intermedians artery as defined by an in lesion MLD of 1.3 mm and a large burden not only of plaque but of \"red\" thrombus status post PTCA and stenting with a 3 x 18 mm Xience Alpine stent.  40% ostial narrowing of a distal branch of the EDYTA with iFR of 0.90   8. NSTEMI, 5-29-18:  · [facility]   severe 1 vessel coronary artery disease involving a 100% thrombotic ostial LAD stenosis.  The lesion is now status post aspiration thrombectomy and angioplasty.  There is at least a residual 30-40% ostial segment stenosis.  · There is a patent previously placed stent in the ostial ramus intermedius artery with at most a 20% ostial segment narrowing.         Dyslipidemia    History of leukemia    Neuropathy due to chemotherapeutic drug    Tobacco abuse    Obesity    KENNETH (acute kidney injury)    Coronary artery disease involving native coronary artery of native heart with angina pectoris    Overview Signed 6/1/2018 10:42 AM by ROBBIN Montelongo     Added automatically from request for surgery [id number]         Wheelchair bound             Brief Hospital Course to date:  Nikki is a 37 y.o. male with a past medical history significant for CAD s/p MI x2 with stent placement, HTN, HLD, and leukemia in remission, who presented to [facility] with chest pain that started about 4 preceding admission, admitted with NSTEM-s/p [facility] on 5/29 by Dr Triana showing 1 vessel CAD with 100% thrombosed ostial LAD-s/p aspiration thrombectomy and angioplasty with at least 30-40% residual stenosis, which will require CABG.    Assessment & Plan:  - CTS/card following, awaiting P2Y12/normalization of platelet function prior to CABG.  - CABG likely monday by Dr. Leggett  - Cont hep/nitro gtt  - Conts to chew tobacco  - Home MS continue, plus Norco ordered for chronic pain; Dilaudid PRN for break through pain. Will not increase Dilaudid at this time  - Miralax    DVT Prophylaxis:  Hep gtt    CODE STATUS: Full Code    Disposition:TBD      Electronically signed " by KRISTOPHER Gurrola, 06/01/18, 3:06 PM.

## 2018-06-01 NOTE — PROGRESS NOTES
Continued Stay Note  [facility]     Patient Name: Paulie Jordan  MRN: [id number]  Today's Date: 6/1/2018    Admit Date: 5/29/2018          Discharge Plan     Row Name 06/01/18 1145       Plan    Plan Home    Patient/Family in Agreement with Plan yes    Plan Comments Plan is Monday having a CABG. CM will continue to follow for discharge needs.    Final Discharge Disposition Code 01 - home or self-care              Discharge Codes    No documentation.       Expected Discharge Date and Time     Expected Discharge Date Expected Discharge Time    Jun 5, 2018             Glendy Law RN

## 2018-06-02 NOTE — PLAN OF CARE
Problem: Patient Care Overview  Goal: Plan of Care Review  Outcome: Ongoing (interventions implemented as appropriate)   06/02/18 4128   Coping/Psychosocial   Plan of Care Reviewed With patient   Plan of Care Review   Progress no change   OTHER   Outcome Summary VSS. SR on the monitor. Continued heparin gtts, nitro gtts held this afternoon related to decreased BP, Nitro gtts at 5 mcg/mmin infusing at this time. Pt c/o left shoulder, arm pain, receiving dilaudid IV. No other complainsl

## 2018-06-02 NOTE — PLAN OF CARE
Problem: Patient Care Overview  Goal: Plan of Care Review  Outcome: Ongoing (interventions implemented as appropriate)   06/02/18 0623   Coping/Psychosocial   Plan of Care Reviewed With patient   Plan of Care Review   Progress no change   OTHER   Outcome Summary VSS. Pt did not c/o of chest pain, just left shoulder pain, PRN meds given around the clock. pt on RA. Stood up holding on to the wheelchair on the side of the bed for 2 minutes while his sheets were changed. no other complaints. will continue to monitor.

## 2018-06-02 NOTE — PROGRESS NOTES
[facility] Medicine Services  PROGRESS NOTE    Patient Name: Paulie Jordan  : 1980  MRN: [id number]    Date of Admission: 2018  Length of Stay: 4  Primary Care Physician: Cesar Neri MD    Subjective   Subjective     CC:  Chest pain    Subjective:  Resting in bed in no acute distress.  No chest pain or palpitation.  No shortness of breath at rest.  No fever or chills.  No nausea, vomiting, diarrhea, abdominal pain.  He is a bit anxious about tomorrow.      Review of Systems   Gen- No fevers, chills  CV- No chest pain, palpitations  Resp- No cough, dyspnea  GI- No N/V/D, abd pain    Otherwise ROS is negative except as mentioned in the HPI.    Objective   Objective     Vital Signs:   Temp:  [98.3 °F (36.8 °C)-98.8 °F (37.1 °C)] 98.3 °F (36.8 °C)  Heart Rate:  [69-80] 80  Resp:  [16-20] 18  BP: ()/(55-76) 97/63        Physical Exam:  Gen-no acute distress, resting in bed  CV-RRR, S1 S2 normal, no m/r/g  Resp-CTAB, normal WOB  Abd-Very obese abdomen.  soft, NT, ND, +BS  Ext-+2 pitting edema, PPP.  Morbidly obese.  Neuro-A&Ox3, no focal deficits  Psych-looks anxious.    Results Reviewed:  I have personally reviewed current lab, radiology, and data and agree.      Results from last 7 days  Lab Units 18  0155 18  0206 18  1505   WBC 10*3/mm3 8.14 9.66 9.02   HEMOGLOBIN g/dL 12.3* 15.5 15.5   HEMATOCRIT % 38.3* 46.4 45.6   PLATELETS 10*3/mm3 194 204 243   INR   --  0.98  --        Results from last 7 days  Lab Units 18  0150 18  1009 18  0452 18  0206 18  2231  18  1505   SODIUM mmol/L 143 136  --  136  --   --  133*   POTASSIUM mmol/L 4.8 3.8  --  3.9  --   --  3.8   CHLORIDE mmol/L 109 105  --  106  --   --  104   CO2 mmol/L 27.0 24.0  --  20.0  --   --  22.9*   BUN mg/dL 13 14  --  10  --   --  12   CREATININE mg/dL 1.20 1.30  --  1.50*  --   --  1.52*   GLUCOSE mg/dL 126* 106*  --  121*  --   --  100   CALCIUM  mg/dL 8.6* 8.1*  --  9.1  --   --  8.8   ALT (SGPT) U/L  --   --   --  35  --   --  34   AST (SGOT) U/L  --   --   --  60*  --   --  33   TROPONIN I ng/mL  --   --  18.261* 9.126* 6.019*  < > 0.486*   < > = values in this interval not displayed.  Estimated Creatinine Clearance: 114.8 mL/min (by C-G formula based on SCr of 1.2 mg/dL).  No results found for: BNP    Results for orders placed during the hospital encounter of 05/29/18   Adult Transthoracic Echo Complete W/ Cont if Necessary Per Protocol    Narrative · The study is technically difficult for diagnosis.  · Left ventricular systolic function is mildly decreased. Estimated EF =   40%.  · The following left ventricular wall segments are hypokinetic: mid   anterior, apical anterior, apical inferior, apical septal, apex   hypokinetic and mid anteroseptal.          I have reviewed the medications.    Assessment/Plan   Assessment / Plan     Hospital Problem List     * (Principal)NSTEMI (non-ST elevated myocardial infarction)    Hypertension    Coronary artery disease involving native coronary artery of native heart    Overview Addendum 5/30/2018  9:47 AM by ROBBIN Kunz     1.   NSTEMI 8/22/2016   2. Cardiac catheterization 8/22/2016: Single vessel- CAD with total ostial occlusion of LAD. PTCA/stent of LAD with 3.5 x 33 mm ALISHA reducing 100% stenosis to 0%. EF 40-45%   3. Echocardiogram 8/23/2016: EF 55%. Trace MR. Trace TR.  4. NSTEMI 09/2017: LHC demonstrating normal EF, widely patent LAD stent, no obstructive disease   5. Recurrent NSTEMI 02/2018: treated medically due to recent LHC demonstrating no obstructive disease, felt to be related to small vessel disease vs coronary spasm   6. Echo 2/3/2018: EF 60%  7. NSTEMI 03/2018 (with transient 1mm FRITZ inferior leads prior to [facility] arrival)   · Cardiac catheterrization  3/16/18 Dr Munguia:  Anteroapical hypokinesia and left ventricular ejection fraction of 54%,  three-vessel nonobstructive coronary disease and  "a widely patent proximal LAD stent. Despite a \"borderline normal\" iFR, a \"culprit\" lesion in the ostium of his Ramus Intermedians artery as defined by an in lesion MLD of 1.3 mm and a large burden not only of plaque but of \"red\" thrombus status post PTCA and stenting with a 3 x 18 mm Xience Alpine stent.  40% ostial narrowing of a distal branch of the EDYTA with iFR of 0.90   8. NSTEMI, 5-29-18:  · [facility]   severe 1 vessel coronary artery disease involving a 100% thrombotic ostial LAD stenosis.  The lesion is now status post aspiration thrombectomy and angioplasty.  There is at least a residual 30-40% ostial segment stenosis.  · There is a patent previously placed stent in the ostial ramus intermedius artery with at most a 20% ostial segment narrowing.         Dyslipidemia    History of leukemia    Neuropathy due to chemotherapeutic drug    Tobacco abuse    Obesity    KENNETH (acute kidney injury)    Coronary artery disease involving native coronary artery of native heart with angina pectoris    Overview Signed 6/1/2018 10:42 AM by ROBBIN Montelongo     Added automatically from request for surgery [id number]         Wheelchair bound             Brief Hospital Course to date:  Nikki is a 37 y.o. male with a past medical history significant for CAD s/p MI x2 with stent placement, HTN, HLD, and leukemia in remission, who presented to [facility] with chest pain that started about 4 preceding admission, admitted with NSTEM-s/p [facility] on 5/29 by Dr Traina showing 1 vessel CAD with 100% thrombosed ostial LAD-s/p aspiration thrombectomy and angioplasty with at least 30-40% residual stenosis, which will require CABG.    Assessment & Plan:  - CTS/card following, awaiting P2Y12/normalization of platelet function prior to CABG.  - CABG tomorrow by Dr. Leggett  - Cont hep/nitro gtt  - Conts to chew tobacco  - Home MS continue, plus Norco ordered for chronic pain; Dilaudid PRN for break through pain.  - Miralax    DVT Prophylaxis:  Hep gtt    CODE " STATUS: Full Code    Disposition:TBD      Electronically signed by Evan Maldonado MD, 06/02/18, 5:06 PM.

## 2018-06-02 NOTE — PROGRESS NOTES
HEPARIN INFUSION  Paulie Jordan is a  37 y.o. male receiving heparin infusion.     Therapy for (VTE/Cardiac):   Cardiac  Patient Weight: 129 kg  Initial Bolus (Y/N):   Yes  Any Bolus (Y/N):   Yes        Signs or Symptoms of Bleeding: None noted    Cardiac or Other (Not VTE)   Initial Bolus: 60 units/kg (Max 4,000 units)  Initial rate: 12 units/kg/hr (Max 1,000 units/hr)   aPTT    (sec) Bolus Dose Stop Infusion Rate Change Repeat aPTT      < 45 60 units/kg 0 hrs Increase rate by   4 units/kg/hr 6 hrs       45 - 54  30 units/kg 0 hrs Increase rate by 2 units/kg/hr 6 hrs    55 - 70 0 0 hrs No change 6 hrs      71 - 83 0  0 hrs Decrease rate by 2 units/kg/hr 6 hrs      > 83 0 Hold 1 hr Decrease rate by 3 units/kg/hr 6 hrs        Results from last 7 days  Lab Units 06/01/18  0155 05/29/18  0206 05/28/18  1505   INR   --  0.98  --    HEMOGLOBIN g/dL 12.3* 15.5 15.5   HEMATOCRIT % 38.3* 46.4 45.6   PLATELETS 10*3/mm3 194 204 243         Results from last 7 days  Lab Units 06/01/18  0155 05/29/18  0206 05/28/18  1505   INR   --  0.98  --    HEMOGLOBIN g/dL 12.3* 15.5 15.5   HEMATOCRIT % 38.3* 46.4 45.6   PLATELETS 10*3/mm3 194 204 243       Date   Time   aPTT Current Rate (Unit/kg/hr) Bolus   (Units) Rate Change   (Unit/kg/hr) New Rate (Unit/kg/hr) Next aPTT Comments  Pump Check Daily   5/29 0130 pending 0 4000 +7.8 7.8 0800 Alejandra 3239 -acb   5/29 1226 130.5 7.8 Hold 1 hour -2.8 5 1800 Antoinette 3249   5/29 1757 36.4 5 5000 +4 9 0100 D/w Antoinette   5/30 0142 69.7 9 -- -- 9 0800 D/w RN   5/30 0817 59.2 9 -- -- 9 0600 Verified pump  Antoinette 3252   5/31 0336 49.9 9 3000 +2 11 1300 Elizabeth 3184   5/31 1303 66.9 11 -- -- 11 1900 [facility] 3184   5/31 1923 70.3 11 -- - 0.5 10.5 0200 Spoke with Karlie   6/1 0345 54.3 10.5 -- -- 10.5 0800 D/w RN   6/1 1202 44.2 10.5 3000 +2 12.5 2000 Verified pump,  Elizabeth 318   6/1 2003 61.5 12.5 -- -- 12.5 0200 D/w Alejandra   6/2 0150 69.0 12.5 -- -- 12.5 0800 Alejandra 3249 [facility]   6/2 1007 64.1 12.5 -- -- 12.5 1600  D/W HONEY Mota, PharmD  6/2/2018  10:52 AM

## 2018-06-02 NOTE — PROGRESS NOTES
"Paulie Jordan  [id number]  1980     LOS: 4 days   Patient Care Team:  Cesar Neri MD as PCP - General  Cesar Neri MD as PCP - Family Medicine  Cesar Neri MD as PCP - Claims Attributed  Ernestine Barajas RN as Care Coordinator (Population Health)    Chief Complaint: Coronary artery disease, myocardial infarction      Subjective: Denies pain or shortness of breath    Objective:     Vital Sign Min/Max for last 24 hours  Temp  Min: 97.6 °F (36.4 °C)  Max: 98.8 °F (37.1 °C)   BP  Min: 96/55  Max: 113/76   Pulse  Min: 69  Max: 88   Resp  Min: 16  Max: 20   SpO2  Min: 96 %  Max: 98 %   No Data Recorded   No Data Recorded     Flowsheet Rows      First Filed Value   Admission Height  182.9 cm (72\") Documented at 05/29/2018 0100   Admission Weight  129 kg (284 lb) Documented at 05/29/2018 0100          Physical Exam:Breathing easily    Wound:    Pulses:     Mediastinal and Chest Tube Drainage:       Results Review:     Results from last 7 days  Lab Units 06/01/18  0155   WBC 10*3/mm3 8.14   HEMOGLOBIN g/dL 12.3*   HEMATOCRIT % 38.3*   PLATELETS 10*3/mm3 194       Results from last 7 days  Lab Units 06/02/18  0150   SODIUM mmol/L 143   POTASSIUM mmol/L 4.8   CHLORIDE mmol/L 109   CO2 mmol/L 27.0   BUN mg/dL 13   CREATININE mg/dL 1.20   GLUCOSE mg/dL 126*   CALCIUM mg/dL 8.6*             Assessment    Principal Problem:    NSTEMI (non-ST elevated myocardial infarction)  Active Problems:    Hypertension    Coronary artery disease involving native coronary artery of native heart    Dyslipidemia    History of leukemia    Neuropathy due to chemotherapeutic drug    Tobacco abuse    Obesity    KENNETH (acute kidney injury)    Coronary artery disease involving native coronary artery of native heart with angina pectoris    Wheelchair bound      Patient appears to be stable, P2Y 12 greater than 200.        Ramesh Leggett MD  06/02/18  6:52 AM      Please note that portions of this note were completed with a voice " recognition program. Efforts were made to edit the dictations, but words may be mistranscribed

## 2018-06-03 NOTE — PLAN OF CARE
"Problem: Patient Care Overview  Goal: Plan of Care Review  Outcome: Ongoing (interventions implemented as appropriate)   06/03/18 0175   Coping/Psychosocial   Plan of Care Reviewed With patient;family   Plan of Care Review   Progress improving   OTHER   Outcome Summary VSS for the most part besides his BP he had been hypotensive most of the day with a SBP in the 80's-90's. NTG gtt was held d/t hypotension. Pt still c/o persistant cp radiating to the left shoulder that is \"relieved\" with dilaudid. CABG in AM.         "

## 2018-06-03 NOTE — PROGRESS NOTES
LOS: 5 days   Patient Care Team:  Cesar Neri MD as PCP - General  Cesar Neri MD as PCP - Family Medicine  Cesar Neri MD as PCP - Claims Attributed  Ernestine Barajas, RN as Care Coordinator (Population Health)    Chief complaint: Coronary disease    Subjective   Denies chest pain, denies shortness of breath    Objective    Vital Signs  Temp:  [97.1 °F (36.2 °C)-98.5 °F (36.9 °C)] 97.1 °F (36.2 °C)  Heart Rate:  [65-83] 79  Resp:  [16-18] 18  BP: ()/(51-78) 93/60    Physical Exam:   General Appearance: alert, appears stated age and cooperative   Lungs: clear bilaterally   Heart: Regular rate and rhythm   Skin:       Results     Results from last 7 days  Lab Units 06/01/18  0155   WBC 10*3/mm3 8.14   HEMOGLOBIN g/dL 12.3*   HEMATOCRIT % 38.3*   PLATELETS 10*3/mm3 194       Results from last 7 days  Lab Units 06/02/18  0150   SODIUM mmol/L 143   POTASSIUM mmol/L 4.8   CHLORIDE mmol/L 109   CO2 mmol/L 27.0   BUN mg/dL 13   CREATININE mg/dL 1.20   GLUCOSE mg/dL 126*   CALCIUM mg/dL 8.6*               Assessment    Principal Problem:    NSTEMI (non-ST elevated myocardial infarction)  Active Problems:    Hypertension    Coronary artery disease involving native coronary artery of native heart    Dyslipidemia    History of leukemia    Neuropathy due to chemotherapeutic drug    Tobacco abuse    Obesity    KENNETH (acute kidney injury)    Coronary artery disease involving native coronary artery of native heart with angina pectoris    Wheelchair bound        Plan   Patient stable, he is awaiting surgery tomorrow morning  His blood pressures below his systolics in the 80s and 90s.  His blood pressure medicine was held nitroglycerin drip is off at the moment    Ruperto Harris PA-C  06/03/18  10:59 AM     Mild CP intermittently.  Pre op AM.   I have reviewed, verified, and confirmed the above history and current status.  I have examined the patient and confirmed the above physical  findings.    Zbigniew Coleman MD  CTSurgery  06/03/18   2:03 PM

## 2018-06-03 NOTE — PROGRESS NOTES
HEPARIN INFUSION  Paulie Jordan is a  37 y.o. male receiving heparin infusion.     Therapy for (VTE/Cardiac):   Cardiac  Patient Weight: 129 kg  Initial Bolus (Y/N):   Yes  Any Bolus (Y/N):   Yes        Signs or Symptoms of Bleeding: None noted    Cardiac or Other (Not VTE)   Initial Bolus: 60 units/kg (Max 4,000 units)  Initial rate: 12 units/kg/hr (Max 1,000 units/hr)   aPTT    (sec) Bolus Dose Stop Infusion Rate Change Repeat aPTT      < 45 60 units/kg 0 hrs Increase rate by   4 units/kg/hr 6 hrs       45 - 54  30 units/kg 0 hrs Increase rate by 2 units/kg/hr 6 hrs    55 - 70 0 0 hrs No change 6 hrs      71 - 83 0  0 hrs Decrease rate by 2 units/kg/hr 6 hrs      > 83 0 Hold 1 hr Decrease rate by 3 units/kg/hr 6 hrs    Results from last 7 days   Lab Units 06/01/18  0155 05/29/18  0206 05/28/18  1505   INR  --  0.98 --    HEMOGLOBIN g/dL 12.3* 15.5 15.5   HEMATOCRIT % 38.3* 46.4 45.6   PLATELETS 10*3/mm3 194 204 243       Date   Time   aPTT Current Rate (Unit/kg/hr) Bolus   (Units) Rate Change   (Unit/kg/hr) New Rate (Unit/kg/hr) Next aPTT Comments  Pump Check Daily   5/29 0130 pending 0 4000 +7.8 7.8 0800 Alejandra 3239 -acb   5/29 1226 130.5 7.8 Hold 1 hour -2.8 5 1800 Antoinette 3249   5/29 1757 36.4 5 5000 +4 9 0100 D/w Antoinette   5/30 0142 69.7 9 -- -- 9 0800 D/w RN   5/30 0817 59.2 9 -- -- 9 0600 Verified pump  Antoinette 3252   5/31 0336 49.9 9 3000 +2 11 1300 Elizabeth 3184   5/31 1303 66.9 11 -- -- 11 1900 Elizabeth 3184   5/31 1923 70.3 11 -- - 0.5 10.5 0200 Spoke with Karlie   6/1 0345 54.3 10.5 -- -- 10.5 0800 D/w RN   6/1 1202 44.2 10.5 3000 +2 12.5 2000 Verified pump,  Elizabeth 3184   6/1 2003 61.5 12.5 -- -- 12.5 0200 D/w Alejandra   6/2 0150 69.0 12.5 -- -- 12.5 0800 Alejandra 324 -[facility]   6/2 1007 64.1 12.5 -- -- 12.5 1600 D/W HONEY [place]   6/2 1557 77.6 12.5 -- -1.5 11 2300 DW RN   6/3 0002 48.5 11 3000 +1 12 0800 [facility] 3249 -[facility]   6/3 0738 93.8 12 -- Hold 1 hour, -3 9 1600 D/W HONEY [place], hold drip 1 hour                                                             Rolanda Mota, PharmD  6/3/2018  8:37 AM

## 2018-06-03 NOTE — PROGRESS NOTES
[facility] Medicine Services  PROGRESS NOTE    Patient Name: Paulie Jordan  : 1980  MRN: [id number]    Date of Admission: 2018  Length of Stay: 5  Primary Care Physician: Cesar Neri MD    Subjective   Subjective     CC:  Chest pain    Subjective:  Resting in bed in no acute distress. BP has been on the low side today. No chest pain or palpitation.  No shortness of breath at rest.  No fever or chills.  No nausea, vomiting, diarrhea, abdominal pain.  He is a bit anxious about tomorrow.      Review of Systems   Gen- No fevers, chills  CV- No chest pain, palpitations  Resp- No cough, dyspnea  GI- No N/V/D, abd pain    Otherwise ROS is negative except as mentioned in the HPI.    Objective   Objective     Vital Signs:   Temp:  [97.1 °F (36.2 °C)-98.5 °F (36.9 °C)] 97.1 °F (36.2 °C)  Heart Rate:  [65-83] 79  Resp:  [16-18] 18  BP: ()/(51-78) 93/60        Physical Exam:  Gen-no acute distress, resting in bed  CV-RRR, S1 S2 normal, no m/r/g  Resp-CTAB, normal WOB  Abd-Very obese abdomen.  soft, NT, ND, +BS  Ext-+2 pitting edema, PPP.  Morbidly obese.  Neuro-A&Ox3, no focal deficits  Psych-looks anxious.    Results Reviewed:  I have personally reviewed current lab, radiology, and data and agree.      Results from last 7 days  Lab Units 18  0155 18  0206 18  1505   WBC 10*3/mm3 8.14 9.66 9.02   HEMOGLOBIN g/dL 12.3* 15.5 15.5   HEMATOCRIT % 38.3* 46.4 45.6   PLATELETS 10*3/mm3 194 204 243   INR   --  0.98  --        Results from last 7 days  Lab Units 18  0150 18  1009 18  0452 18  0206 18  2231  18  1505   SODIUM mmol/L 143 136  --  136  --   --  133*   POTASSIUM mmol/L 4.8 3.8  --  3.9  --   --  3.8   CHLORIDE mmol/L 109 105  --  106  --   --  104   CO2 mmol/L 27.0 24.0  --  20.0  --   --  22.9*   BUN mg/dL 13 14  --  10  --   --  12   CREATININE mg/dL 1.20 1.30  --  1.50*  --   --  1.52*   GLUCOSE mg/dL 126* 106*  --   121*  --   --  100   CALCIUM mg/dL 8.6* 8.1*  --  9.1  --   --  8.8   ALT (SGPT) U/L  --   --   --  35  --   --  34   AST (SGOT) U/L  --   --   --  60*  --   --  33   TROPONIN I ng/mL  --   --  18.261* 9.126* 6.019*  < > 0.486*   < > = values in this interval not displayed.  Estimated Creatinine Clearance: 114.8 mL/min (by C-G formula based on SCr of 1.2 mg/dL).  No results found for: BNP    Results for orders placed during the hospital encounter of 05/29/18   Adult Transthoracic Echo Complete W/ Cont if Necessary Per Protocol    Narrative · The study is technically difficult for diagnosis.  · Left ventricular systolic function is mildly decreased. Estimated EF =   40%.  · The following left ventricular wall segments are hypokinetic: mid   anterior, apical anterior, apical inferior, apical septal, apex   hypokinetic and mid anteroseptal.          I have reviewed the medications.    Assessment/Plan   Assessment / Plan     Hospital Problem List     * (Principal)NSTEMI (non-ST elevated myocardial infarction)    Hypertension    Coronary artery disease involving native coronary artery of native heart    Overview Addendum 5/30/2018  9:47 AM by ROBBIN Kunz     1.   NSTEMI 8/22/2016   2. Cardiac catheterization 8/22/2016: Single vessel- CAD with total ostial occlusion of LAD. PTCA/stent of LAD with 3.5 x 33 mm ALISHA reducing 100% stenosis to 0%. EF 40-45%   3. Echocardiogram 8/23/2016: EF 55%. Trace MR. Trace TR.  4. NSTEMI 09/2017: LHC demonstrating normal EF, widely patent LAD stent, no obstructive disease   5. Recurrent NSTEMI 02/2018: treated medically due to recent LHC demonstrating no obstructive disease, felt to be related to small vessel disease vs coronary spasm   6. Echo 2/3/2018: EF 60%  7. NSTEMI 03/2018 (with transient 1mm FRITZ inferior leads prior to [facility] arrival)   · Cardiac catheterrization  3/16/18 Dr Munguia:  Anteroapical hypokinesia and left ventricular ejection fraction of 54%,  three-vessel  "nonobstructive coronary disease and a widely patent proximal LAD stent. Despite a \"borderline normal\" iFR, a \"culprit\" lesion in the ostium of his Ramus Intermedians artery as defined by an in lesion MLD of 1.3 mm and a large burden not only of plaque but of \"red\" thrombus status post PTCA and stenting with a 3 x 18 mm Xience Alpine stent.  40% ostial narrowing of a distal branch of the EDYTA with iFR of 0.90   8. NSTEMI, 5-29-18:  · C   severe 1 vessel coronary artery disease involving a 100% thrombotic ostial LAD stenosis.  The lesion is now status post aspiration thrombectomy and angioplasty.  There is at least a residual 30-40% ostial segment stenosis.  · There is a patent previously placed stent in the ostial ramus intermedius artery with at most a 20% ostial segment narrowing.         Dyslipidemia    History of leukemia    Neuropathy due to chemotherapeutic drug    Tobacco abuse    Obesity    KENNETH (acute kidney injury)    Coronary artery disease involving native coronary artery of native heart with angina pectoris    Overview Signed 6/1/2018 10:42 AM by ROBBIN Montelongo     Added automatically from request for surgery [id number]         Wheelchair bound             Brief Hospital Course to date:  Nikki is a 37 y.o. male with a past medical history significant for CAD s/p MI x2 with stent placement, HTN, HLD, and leukemia in remission, who presented to [facility] with chest pain that started about 4 preceding admission, admitted with NSTEM-s/p C on 5/29 by Dr Triana showing 1 vessel CAD with 100% thrombosed ostial LAD-s/p aspiration thrombectomy and angioplasty with at least 30-40% residual stenosis, which will require CABG.    Assessment & Plan:  - CTS/card following, awaiting P2Y12/normalization of platelet function prior to CABG.  - CABG tomorrow by Dr. Leggett  - Cont hep/nitro gtt  - Conts to chew tobacco  - Home MS continue, plus Norco ordered for chronic pain; Dilaudid PRN for break through pain.  - Miralax    DVT " Prophylaxis:  Hep gtt    CODE STATUS: Full Code    Disposition:TBD      Electronically signed by Evan Maldonado MD, 06/03/18, 3:37 PM.

## 2018-06-03 NOTE — PLAN OF CARE
Problem: Patient Care Overview  Goal: Plan of Care Review  Outcome: Ongoing (interventions implemented as appropriate)   06/03/18 0550   Coping/Psychosocial   Plan of Care Reviewed With patient   Plan of Care Review   Progress no change   OTHER   Outcome Summary Pt's vitals are stable, remains on Heparin and Nitro drip - pt c/o persistent (6/10) chest/arm pain despite interventions. Pt had no events through out the night.      Goal: Discharge Needs Assessment  Outcome: Ongoing (interventions implemented as appropriate)   05/30/18 0859 06/03/18 0550   Discharge Needs Assessment   Readmission Within the Last 30 Days unable to assess --    Concerns to be Addressed no discharge needs identified --    Transportation Anticipated family or friend will provide --    Current Discharge Risk --  chronically ill       Problem: Cardiac: ACS (Acute Coronary Syndrome) (Adult)  Goal: Signs and Symptoms of Listed Potential Problems Will be Absent, Minimized or Managed (Cardiac: ACS)  Outcome: Ongoing (interventions implemented as appropriate)   06/03/18 0550   Goal/Outcome Evaluation   Problems Assessed (Acute Coronary Syndrome) all   Problems Present (Acute Coronary Syn) chest pain (angina)       Problem: Fall Risk (Adult)  Goal: Identify Related Risk Factors and Signs and Symptoms  Outcome: Ongoing (interventions implemented as appropriate)   06/03/18 0550   Fall Risk (Adult)   Related Risk Factors (Fall Risk) depression/anxiety;fatigue/slow reaction;gait/mobility problems;neuro disease/injury;polypharmacy;environment unfamiliar   Signs and Symptoms (Fall Risk) presence of risk factors     Goal: Absence of Fall  Outcome: Ongoing (interventions implemented as appropriate)   06/03/18 0550   Fall Risk (Adult)   Absence of Fall achieves outcome       Problem: Skin Injury Risk (Adult)  Goal: Identify Related Risk Factors and Signs and Symptoms  Outcome: Ongoing (interventions implemented as appropriate)   06/03/18 0550   Skin Injury  Risk (Adult)   Related Risk Factors (Skin Injury Risk) edema;mobility impaired;medication;tissue perfusion altered     Goal: Skin Health and Integrity  Outcome: Ongoing (interventions implemented as appropriate)   06/03/18 0550   Skin Injury Risk (Adult)   Skin Health and Integrity achieves outcome

## 2018-06-04 NOTE — PROGRESS NOTES
"  [facility]  PROGRESS NOTE    Date of Admission: 5/29/2018  Length of Stay: 6  Primary Care Physician: Cesar Neri MD    Chief Complaint: f/u recurrent NSTEMI, HTN, HLD    Subjective      Events noted, patient seen immediately post-op. Intubated and sedated       Objective   Vitals: /46   Pulse 81   Temp 95.8 °F (35.4 °C)   Resp 14   Ht 182.9 cm (72\")   Wt 135 kg (297 lb)   SpO2 98%   BMI 40.28 kg/m²     Physical Exam:  GENERAL: Sedated, intubated   HEENT: Fundoscopic deferred, otherwise unremarkable.  HEART: No discrete PMI is noted. Regular rhythm, normal rate, and no murmur or rub  LUNGS: Intubated on mechanical ventilation.  No wheezing, rales or ronchi.  ABDOMEN: Soft, BS present   NEUROLOGIC: Sedated   EXTREMITIES: No clubbing, cyanosis. Dependent edema bilateral feet     Results:    Results from last 7 days  Lab Units 06/04/18  1203 06/04/18  1046 06/04/18  1001  06/04/18  0022 06/01/18  0155   WBC 10*3/mm3 10.46  --   --   --  7.65 8.14   HEMOGLOBIN g/dL 9.3*  --   --   --  11.8* 12.3*   HEMOGLOBIN, POC g/dL  --  8.8* 8.2*  < >  --   --    HEMATOCRIT % 28.5*  --   --   --  37.1* 38.3*   HEMATOCRIT POC %  --  26* 24*  < >  --   --    PLATELETS 10*3/mm3 134*  --   --   --  202 194   < > = values in this interval not displayed.    Results from last 7 days  Lab Units 06/04/18  0511 06/02/18  0150 05/30/18  1009   SODIUM mmol/L 135 143 136   POTASSIUM mmol/L 5.1 4.8 3.8   CHLORIDE mmol/L 105 109 105   CO2 mmol/L 22.0 27.0 24.0   BUN mg/dL 22 13 14   CREATININE mg/dL 1.40* 1.20 1.30   GLUCOSE mg/dL 98 126* 106*      Lab Results   Component Value Date    CHOL 223 (H) 05/28/2018    TRIG 380 (H) 05/28/2018    HDL 37 (L) 05/28/2018     (H) 05/28/2018    AST 35 (H) 06/04/2018    ALT 37 06/04/2018       Results from last 7 days  Lab Units 05/29/18  0434   HEMOGLOBIN A1C % 5.70*       Results from last 7 days  Lab Units 05/28/18  2231   CHOLESTEROL mg/dL " 223*   TRIGLYCERIDES mg/dL 380*   HDL CHOL mg/dL 37*   LDL CHOL mg/dL 110*           Results from last 7 days  Lab Units 05/29/18  0206 05/28/18  1505   BNP pg/mL 42.0 15.0       Results from last 7 days  Lab Units 06/04/18  0511 06/03/18  1922 06/03/18  1430 06/03/18  0738  05/29/18  0206   PROTIME Seconds 10.1  --   --   --   --  10.3   INR  0.96  --   --   --   --  0.98   APTT seconds  --  52.6* 47.8* 93.8*  < > <24.0*   < > = values in this interval not displayed.    Results from last 7 days  Lab Units 05/29/18  0452 05/29/18  0206 05/28/18  2231   TROPONIN I ng/mL 18.261* 9.126* 6.019*       Intake/Output Summary (Last 24 hours) at 06/04/18 1222  Last data filed at 06/04/18 1145   Gross per 24 hour   Intake             1803 ml   Output             3035 ml   Net            -1232 ml     I personally reviewed the patient's EKG/Telemetry data    Radiology Data:   CXR image reviewed, report pending     Current Medications:    [START ON 6/5/2018] aspirin 325 mg Oral Daily   aspirin 325 mg Oral Once   atorvastatin 80 mg Oral Nightly   buPROPion  mg Oral Daily   cefuroxime 1.5 g Intravenous Q8H   chlorhexidine 15 mL Mouth/Throat Q12H   citalopram 40 mg Oral Daily   febuxostat 40 mg Oral Daily   levothyroxine 50 mcg Oral Q AM   [START ON 6/5/2018] metoprolol tartrate 12.5 mg Oral Q12H   metoprolol tartrate 2.5 mg Intravenous Q6H   [MAR Hold] Morphine 30 mg Oral Q12H   [MAR Hold] mupirocin  Each Nare BID   norepinephrine (LEVOPHED) 32 mcg/mL (8 mg/250 mL) infusion 0.02-0.3 mcg/kg/min Intravenous Once   pantoprazole 40 mg Oral Q AM   [MAR Hold] pharmacy consult - MTM  Does not apply Daily   [MAR Hold] polyethylene glycol 17 g Oral Daily   protamine      protamine 100 mg Intravenous Once   sennosides-docusate sodium 2 tablet Oral BID       dexmedetomidine 0.2-1.5 mcg/kg/hr   dextrose 5 % with KCl 20 mEq 30 mL/hr   DOBUTamine 2-20 mcg/kg/min   DOPamine 2-20 mcg/kg/min   EPINEPHrine 0.02-0.3 mcg/kg/min   insulin  regular infusion 1 unit/mL (CCU use) 0-50 Units/hr   niCARdipine 5-15 mg/hr   nitroglycerin 5-200 mcg/min   norepinephrine 0.02-0.3 mcg/kg/min   phenylephrine 0.5-3 mcg/kg/min   propofol 5-50 mcg/kg/min   sodium chloride 30 mL/hr   vasopressin 0.02-0.1 Units/min       Assessment and Plan:   1. CAD with recurrent NSTEMI  - [facility] 5/29 with severe 1 vessel CAD involving 100% thrombotic ostial LAD stenosis, s/p aspiration thrombectomy  - Echo EF 40%  - s/p 2 vessel CABG today per Dr. Leggett   - hemodynamically stable on low dose NE post-op, currently NSR  - continue routine post-op care   2. HLD  -  , on Lipitor 80mg  3. Tobacco abuse  4. Probable medical noncompliance   5. 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  12:22 PM  06/04/18    I have seen and examined the patient, case was discussed with the physician extender, reviewed the above note, necessary changes were made and I agree with the final note.   Deovrah Hightower MD, FACC, [facility]

## 2018-06-04 NOTE — ANESTHESIA PROCEDURE NOTES
Central Line    Patient location during procedure: OR  Indications: vascular access  Preanesthetic Checklist  Completed: patient identified, site marked, surgical consent, pre-op evaluation, timeout performed, IV checked, risks and benefits discussed and monitors and equipment checked  Central Line Prep  Sterile Tech:cap, gloves, gown, mask and sterile barriers  Prep: chloraprep  Patient monitoring: blood pressure monitoring, continuous pulse oximetry and EKG  Central Line Procedure  Laterality:right  Location:internal jugular  Catheter Type:Cordis and Melrose-Madisyn  Catheter Size:9 Fr  Guidance:ultrasound guided  PROCEDURE NOTE/ULTRASOUND INTERPRETATION.  Using ultrasound guidance the potential vascular sites for insertion of the catheter were visualized to determine the patency of the vessel to be used for vascular access.  After selecting the appropriate site for insertion, the needle was visualized under ultrasound being inserted into the internal jugular vein, followed by ultrasound confirmation of wire and catheter placement. There were no abnormalities seen on ultrasound; an image was taken; and the patient tolerated the procedure with no complications.   Assessment  Post procedure:biopatch applied, line sutured, occlusive dressing applied and secured with tape  Assessement:blood return through all ports, free fluid flow and chest x-ray ordered  Complications:no  Patient Tolerance:patient tolerated the procedure well with no apparent complications

## 2018-06-04 NOTE — ANESTHESIA PROCEDURE NOTES
Airway  Urgency: elective    Airway not difficult    General Information and Staff    Patient location during procedure: OR    Indications and Patient Condition  Indications for airway management: airway protection    Preoxygenated: yes  MILS not maintained throughout  Mask difficulty assessment: 1 - vent by mask    Final Airway Details  Final airway type: endotracheal airway      Successful airway: ETT  Cuffed: yes   Successful intubation technique: direct laryngoscopy  Endotracheal tube insertion site: oral  Blade: Lisa  Blade size: #4  ETT size: 8.0 mm  Cormack-Lehane Classification: grade I - full view of glottis  Placement verified by: chest auscultation and capnometry   Measured from: lips  ETT to lips (cm): 20  Number of attempts at approach: 1    Additional Comments  Negative epigastric sounds, Breath sound equal bilaterally with symmetric chest rise and fall

## 2018-06-04 NOTE — OP NOTE
Operative Report  Paulie Jordan  [id number]  1980    Preop Diagnosis: Two-vessel coronary artery disease, status post myocardial infarction        Postop Diagnosis same        Procedure: Coronary artery bypass graft ×2 with EVH.   saphenous vein graft to circumflex and left internal mammary artery to LAD        Surgeons: Ramesh Leggett        Assistant: Ki canela        Operative Findings:         Description: Patient was brought to the operating room placed under general anesthesia.  Patient placement of Fayette-Madisyn catheter, arterial line, Hair catheter.  The patient was sterilely prepped and draped.  Vein was harvested from the right leg from below the knee to groin using EVH technique.  Incision was then made in the left leg as well as open technique at both ankles and were unable to obtained a reasonable vein.  Simultaneously median sternotomy incision was made.  Left internal mammary artery taken down about distally.  The patient was heparinized and activating clotting times confirmed be adequate.  Pericardium was opened stay sutures were placed to Ethilon sutures and placed in ascending aorta and right atrial appendage.  Patient was cannulated arterial venous cannula.  Cardiopulmonary bypass was initiated and the orders crossclamped.  The heart was elevated.  The circumflex vessels opened sharply extended proximally and distally.  An end-to-side anastomosis running several point sutures constructed was tied down.  The LAD is open distally.  Left internal mammary was artery was brought through pericardial tunnel.  It was anastomosed to this with a running 7-0 Prolene suture.  A single aortotomies then made the proximal anastomoses was carried out running 6-0 Prolene suture.  Prior to tying this down hot shot of cardioplegia given.  Aortic cross-clamp was removed.  The patient was warmed and weaned from cardiopulmonary bypass in standard fashion for mucinous Oswaldo heparin decannulation was carried out.   A singly sound tube was placed.  A left chest tube was placed.  Sternum was reapproximated #7 wire.  The linea alba was closed with #1 Ethibond sutures.  Subcutaneous tissues were closed with 0 Vicryl, 2-0 Vicryl, 3-0 Vicryl, 3-0 Monocryl subcuticular stitch.  Cardiopulmonary bypass 62 minutes and cross-clamp 54 minutes.        EBL: 400 cc      Please note that portions of this note were completed with a voice recognition program. Efforts were made to edit the dictations, but words may be mistranscribed      Ramesh Leggett MD  06/04/18 11:12 AM

## 2018-06-04 NOTE — RESEARCH
RISK SCORES  About the STS Risk Calculator  Procedure: CAB Only   Risk of Mortality: 0.947%   Morbidity or Mortality: 15.214%   Long Length of Stay: 4.039%   Short Length of Stay: 58.363%   Permanent Stroke: 0.286%   Prolonged Ventilation: 12.342%   DSW Infection: 0.509%   Renal Failure: 3.128%   Reoperation: 3.919%

## 2018-06-04 NOTE — ANESTHESIA PREPROCEDURE EVALUATION
Anesthesia Evaluation     Patient summary reviewed and Nursing notes reviewed   NPO Solid Status: > 8 hours  NPO Liquid Status: > 8 hours           Airway   Mallampati: II  TM distance: <3 FB  Neck ROM: full  No difficulty expected  Dental    (+) edentulous    Pulmonary     breath sounds clear to auscultation  Cardiovascular     ECG reviewed  Rhythm: regular  Rate: normal    (+) hypertension, past MI  <3 months, CAD, angina,       Neuro/Psych  GI/Hepatic/Renal/Endo    (+) obesity,       Musculoskeletal     Abdominal    Substance History      OB/GYN          Other   (+) blood dyscrasia   history of cancer remission                    Anesthesia Plan    ASA 4     general   (Nanci, pac, tim)  intravenous induction   Anesthetic plan and risks discussed with patient and father.

## 2018-06-04 NOTE — ANESTHESIA PROCEDURE NOTES
Procedure Performed: Emergent/Open-Heart Anesthesia [facility]     Start Time:        End Time:      Preanesthesia Checklist:  Patient identified, IV assessed, risks and benefits discussed, monitors and equipment assessed, procedure being performed at surgeon's request and anesthesia consent obtained.    General Procedure Information  Diagnostic Indications for Echo:  assessment of ascending aorta, assessment of surgical repair and hemodynamic monitoring  Physician Requesting Echo: ROHAN CHEW  Location performed:  OR  Intubated  Bite block not placed  Heart visualized  Probe Insertion:  Easy  Probe Type:  Multiplane  Modalities:  Color flow mapping, continuous wave Doppler and pulse wave Doppler    Echocardiographic and Doppler Measurements    Ventricles    Right Ventricle:  Hypertrophy not present.  Thrombus not present.  Global function normal.    Left Ventricle:  Cavity size normal.  Hypertrophy not present.  Thrombus not present.  Global Function moderately impaired.  Ejection Fraction 35%.      Ventricular Regional Function:  13- Apical Anterior:  akinetic  14- Apical Lateral:  akinetic  15- Apical Inferior:  akinetic  16- Apical Septal:  akinetic  17- [place]:  akinetic      Valves    Aortic Valve:  Annulus normal.  Stenosis not present.  Area: 3.3 cm².  Mean Gradient: 4 mean 2 mmHg.  Regurgitation absent.  Leaflets normal.  Leaflet motions normal.      Mitral Valve:  Annulus normal.  Stenosis not present.  Area: 7.1 cm².  Mean Gradient: 4 mean 1 mmHg.  Regurgitation mild.  Leaflets normal.  Leaflet motions normal.      Tricuspid Valve:  Annulus normal.  Stenosis not present.  Regurgitation trace.  Leaflets normal.  Leaflet motions normal.    Pulmonic Valve:  Annulus normal.  Stenosis not present.  Regurgitation absent.          Aorta    Ascending Aorta:  Size normal.  Dissection not present.  Plaque thickness less than 3 mm.  Mobile plaque not present.    Aortic Arch:  Size normal.  Dissection not present.   Plaque thickness less than 3 mm.  Mobile plaque not present.    Descending Aorta:  Size normal.  Dissection not present.  Plaque thickness less than 3 mm.  Mobile plaque not present.          Atria    Right Atrium:  Size normal.  Spontaneous echo contrast not present.  Thrombus not present.  Tumor not present.  Device present.    Left Atrium:  Size normal.  Spontaneous echo contrast not present.  Thrombus not present.  Tumor not present.  Left atrial appendage normal.      Septa    Atrial Septum:  Intra-atrial septal morphology normal.      Ventricular Septum:  Intra-ventricular septum morphology normal.      Diastolic Function Measurements:  Diastolic Dysfunction Grade=  E= ms  A= ms  E/A Ratio=  DT= ms  S/D= 0.5  IVRT=    Other Findings  Pericardium:  normal  Pleural Effusion:  none  Pulmonary Arteries:  normal  Pulmonary Venous Flow:  normal    Anesthesia Information  Performed Personally  Anesthesiologist:  JIGNESH NGUYEN      Echocardiogram Comments:       Informed consent was obtained preoperatively.  Shilo probe passed without difficulty.

## 2018-06-04 NOTE — PROGRESS NOTES
Intensivist Note     6/5/2018  Hospital Day: 7  Day of Surgery      Mr. Paulie Jordan, 37 y.o. male is followed for:  Principal Problem:    CAD with previous history of stents. Most recent stent 3/16/18  Active Problems:    NSTEMI 5/29/18 s/p thrombectomy and PTCA    S/P CABG x 2 on 6/4/18    KENNETH (acute kidney injury)    Hypertension    Dyslipidemia    History of leukemia    Neuropathy due to chemotherapeutic drug    Wheelchair bound    Tobacco abuse    Obesity       SUBJECTIVE     37-year-old white male with a history of CAD, hypertension, hyperlipidemia, IBS, hypothyroidism on replacement, and leukemia initially diagnosed in 1998 with remission 2000 status post chemotherapy and radiation then bone marrow transplant 2000. CAD has been associated with NSTEMI requiring cardiac catheterization and previous LAD stent placement 8/2016. Had a recurrent  NSTEMI 9/2017 for NSTEMI, but catheterization revealed only nonobstructive disease and a patent LAD stent. He then suffered recurrent NSTEMI 2/2018 which was treated medically. His last NSTEMI occurred 3/2018 and repeat catheterization was performed 3/16/18. This suggested a culprit lesion for which he underwent PTCA and stent to the Ramus intermedius artery. Did well until 5/25/18 when he developed severe chest pain which responded to nitroglycerin. No further problems until 5/28/18 when pain recurred and he presented to [facility] ER where he was again diagnosed with NSTEMI. Was transferred to [facility] 5/29/18 and pain had resolved on IV nitroglycerin. Catheterization 5/29/18 revealed occluded ostial LAD for which he underwent thrombectomy and angioplasty. His previous stent in the ostial ramus intermedius artery was patent. Because of residual disease was decided to proceed with two-vessel CABG with saphenous vein graft to circumflex and left internal mammary artery to LAD and now returns to the ICU on ventilatory support. Note that preoperative LVEF was 40% and  "postop cardiac index is 2.0. He is requiring both dopamine and norepinephrine to support his blood pressure.     Patient has a history of leukemia at age 19 with relapse at age 20 after chemotherapy and radiation therapy. Had bone marrow transplant at that time but had reaction/rejection as well as a chemotherapy induced neuropathy which left him debilitated and wheelchair bound ever since. The chart indicates some spinal involvement as well.    The patient's relevant past medical, surgical and social history were reviewed and updated in Epic as appropriate.    OBJECTIVE     /68   Pulse 74   Temp 98 °F (36.7 °C) (Oral)   Resp 24   Ht 182.9 cm (72.01\")   Wt 135 kg (297 lb 9.9 oz)   SpO2 92%   BMI 40.36 kg/m²   Oxygen Concentration (%): 50      Flowsheet Rows      First Filed Value   Admission Height  182.9 cm (72\") Documented at 05/29/2018 0100   Admission Weight  129 kg (284 lb) Documented at 05/29/2018 0100        Intake & Output (last day)       06/04 0701 - 06/05 0700 06/05 0701 - 06/06 0700    P.O.      I.V. (mL/kg) 2582.4 (19.1) 480.4 (3.6)    Blood 963     Total Intake(mL/kg) 3545.4 (26.3) 480.4 (3.6)    Urine (mL/kg/hr) 2900 (0.9) 470 (0.4)    Emesis/NG output 300 (0.1)     Chest Tube 415 (0.1) 110 (0.1)    Total Output 3615 580    Net -69.6 -99.6                Exam:  General Exam:  Younger appearing white male intubated and on ventilatory support  HEENT: Pupils equal and reactive. OG and ETT in place  Neck:                          Supple, no JVD, thyromegaly, or adenopathy. Right IJ [place] in place  Lungs: Clear but diminished breath sounds anteriorly and laterally  Chest:                          MT and left pleural tube in place. We are leak  Cardiovascular: RRR. No murmurs or gallops or rubs.  Abdomen: Soft nontender without organomegaly or masses.   and rectal: Hair catheter in place  Extremities: No cyanosis or clubbing but obvious lymphedema of legs. Right radial art line in " place  Neurologic:                 Heavily sedated and unresponsive    Chest X-Ray 6/4/18: Very small lung volumes with bilateral hazy vascular infiltrates consistent with congestive changes and some pleural effusions layering posteriorly. ETT in good position. Right IJ [place] in good position.    INFUSIONS    dexmedetomidine 0.2-1.5 mcg/kg/hr Last Rate: 0.5 mcg/kg/hr (06/04/18 1234)   dextrose 5 % with KCl 20 mEq 30 mL/hr Last Rate: 30 mL/hr (06/04/18 1215)   DOPamine 2 mcg/kg/min Last rate 2 mcg   insulin regular infusion 1 unit/mL (CCU use) 0-50 Units/hr    norepinephrine 0.02-0.3 mcg/kg/min Last Rate: 0.02 mcg/kg/min (06/04/18 1215)   propofol 5-50 mcg/kg/min Last Rate: 50 mcg/kg/min (06/04/18 1307)         Results from last 7 days  Lab Units 06/05/18  0342 06/04/18  1548 06/04/18  1203   WBC 10*3/mm3 12.83* 8.70 10.46   HEMOGLOBIN g/dL 8.7* 9.1* 9.3*   HEMATOCRIT % 27.6* 27.8* 28.5*   PLATELETS 10*3/mm3 181 162 134*       Results from last 7 days  Lab Units 06/05/18  0342 06/04/18  1548   SODIUM mmol/L 138 135   POTASSIUM mmol/L 4.8 4.1   CHLORIDE mmol/L 109 104   CO2 mmol/L 23.0 23.0   BUN mg/dL 22 19   CREATININE mg/dL 1.70* 1.40*   GLUCOSE mg/dL 133* 149*   CALCIUM mg/dL 8.1* 8.5*       Results from last 7 days  Lab Units 06/05/18  0342 06/04/18  1548 06/04/18  1203   MAGNESIUM mg/dL 2.4 2.7 2.7   PHOSPHORUS mg/dL 3.0 2.5 3.1       Results from last 7 days  Lab Units 06/04/18  0511   ALK PHOS U/L 120*   BILIRUBIN mg/dL 0.4   ALT (SGPT) U/L 37   AST (SGOT) U/L 35*       No results found for: SEDRATE  Lab Results   Component Value Date    BNP 42.0 05/29/2018     Lab Results   Component Value Date    CKTOTAL 132 03/16/2018    CKMB 11.03 (C) 03/16/2018    CKMBINDEX 8.4 (H) 03/16/2018    TROPONINI 18.261 (C) 05/29/2018     Lab Results   Component Value Date    TSH 4.607 02/03/2018     No results found for: LACTATE  No results found for: CORTISOL      Results from last 7 days  Lab Units 06/04/18 2007 06/04/18  1926  06/04/18  1848 06/04/18  1739 06/04/18  1650   PH, ARTERIAL pH units 7.343* 7.347* 7.331* 7.274* 7.248*   PCO2, ARTERIAL mm Hg 44.2 43.3 45.0 49.1* 51.7*   PO2 ART mm Hg 93.3 102.0 69.0* 68.2* 81.4*   HCO3 ART mmol/L 24.0 23.8 23.7 22.8 22.5   FIO2 % 40 21 40 40 40       I reviewed the patient's results, images and medication.    Assessment/Plan   ASSESSMENT      Principal Problem:    CAD with previous history of stents. Most recent stent 3/16/18  Active Problems:    NSTEMI 5/29/18 s/p thrombectomy and PTCA    S/P CABG x 2 on 6/4/18    KENNETH (acute kidney injury)    Hypertension    Dyslipidemia    History of leukemia    Neuropathy due to chemotherapeutic drug    Wheelchair bound    Tobacco abuse    Obesity      DISCUSSION: Requiring both dopamine and norepinephrine to maintain blood pressure and cardiac index is low. For some reason blood pressure drops when he receives ordered protamine. No excessive bleeding from MT tubes. Gas exchange is adequate although he is under ventilated and ventilator is being adjusted    PLAN     1. Ventilator changes as noted  2. May consider changing dopamine to dobutamine  3. Standard ventilatory wean when alert and hemodynamically stable  4. Watch renal function closely  5. Follow-up platelets  6. Ulcer and DVT prophylaxis    Plan of care and goals reviewed with mulitdisciplinary team at daily rounds.    I discussed the patient's findings and my recommendations with nursing staff    Time spent Critical care 35 min (It does not include procedure time).    Reji Villar MD  Intensive Care Medicine  06/05/18 4:50 PM

## 2018-06-04 NOTE — ANESTHESIA POSTPROCEDURE EVALUATION
Patient: Paulie Jordan    Procedure Summary     Date:  06/04/18 Room / Location:   [facility]  [facility]    Anesthesia Start:  0702 Anesthesia Stop:  1144    Procedure:  MEDIAN STERNOTOMY, CORONARY ARTERY BYPASS X  2 WITH INTERNAL MAMMARY ARTERY GRAFT, EVH OF THE RIGHT GREATER SAPHENOUS VEIN AND EXPLORATION OF THE LEFT LEG (N/A Chest) Diagnosis:       Coronary artery disease involving native coronary artery of native heart with angina pectoris      (Coronary artery disease involving native coronary artery of native heart with angina pectoris [I25.119])    Surgeon:  Ramesh Leggett MD Provider:  Timoteo Thacker MD    Anesthesia Type:  general ASA Status:  4          Anesthesia Type: general  Last vitals  BP   105/63 (117/57 in the left arm) (06/04/18 0609)   Temp   97.7 °F (36.5 °C) (06/04/18 0609)   Pulse   84 (06/04/18 0609)   Resp   16 (06/04/18 0609)     SpO2   99 % (06/04/18 0609)     Post Anesthesia Care and Evaluation    Patient location during evaluation: ICU  Patient participation: complete - patient cannot participate  Level of consciousness: obtunded/minimal responses  Airway patency: patent  Anesthetic complications: No anesthetic complications    Cardiovascular status: acceptable  Respiratory status: ETT, intubated and ventilator

## 2018-06-04 NOTE — NURSING NOTE
KRISTOPHER Larsen notified about pt ABG on half rate on ventilator. ABG looks close to the same as when pt directly came from OR. Pt following commands and awake. Orders to go to spontaneous mode on ventilator and BIPAP ordered for extubation if there are issues. See orders.

## 2018-06-04 NOTE — PLAN OF CARE
Problem: Ventilation, Mechanical Invasive (Adult)  Intervention: Prevent Airway Displacement/Mechanical Dysfunction   06/04/18 1858   Prevent Airway Displacement/Mechanical Dysfunction   Airway Safety Measures mask at bedside;suction at bedside

## 2018-06-04 NOTE — PLAN OF CARE
Problem: Patient Care Overview  Goal: Plan of Care Review  Outcome: Ongoing (interventions implemented as appropriate)   06/04/18 6903   Coping/Psychosocial   Plan of Care Reviewed With patient;family   Plan of Care Review   Progress improving   OTHER   Outcome Summary levo, dopamine gtt on for pressure support and CO support. otherwise VVS. currently weaning vent,. following commands but drowsy       Problem: Cardiac: ACS (Acute Coronary Syndrome) (Adult)  Goal: Signs and Symptoms of Listed Potential Problems Will be Absent, Minimized or Managed (Cardiac: ACS)  Outcome: Ongoing (interventions implemented as appropriate)      Problem: Fall Risk (Adult)  Goal: Identify Related Risk Factors and Signs and Symptoms  Outcome: Ongoing (interventions implemented as appropriate)    Goal: Absence of Fall  Outcome: Ongoing (interventions implemented as appropriate)      Problem: Skin Injury Risk (Adult)  Goal: Identify Related Risk Factors and Signs and Symptoms  Outcome: Ongoing (interventions implemented as appropriate)    Goal: Skin Health and Integrity  Outcome: Ongoing (interventions implemented as appropriate)      Problem: Cardiac Surgery (Adult)  Goal: Signs and Symptoms of Listed Potential Problems Will be Absent, Minimized or Managed (Cardiac Surgery)  Outcome: Ongoing (interventions implemented as appropriate)      Problem: Ventilation, Mechanical Invasive (Adult)  Goal: Signs and Symptoms of Listed Potential Problems Will be Absent, Minimized or Managed (Ventilation, Mechanical Invasive)  Outcome: Ongoing (interventions implemented as appropriate)

## 2018-06-05 PROBLEM — Z95.1 S/P CABG X 2: Status: ACTIVE | Noted: 2018-01-01

## 2018-06-05 NOTE — PROGRESS NOTES
"[facility] at [facility]  IP Progress Note   LOS: 7 days   Patient Care Team:  Cesar Neri MD as PCP - General  Cesar Neri MD as PCP - Family Medicine  Cesar Neri MD as PCP - Claims Attributed  Ernestine Barajas, RN as Care Coordinator (Population Health)    Chief Complaint: Follow up for CAD/acute MI/dyslipidemia/hypertension.    Subjective    Complaining of a lot of pain, states that oral medications are not working and he would like IV pain management.  Still has mediastinal tubes.       No problems updated.  Tele: Sinus Rythym    Vitals:  Blood pressure 93/64, pulse 72, temperature 99.9 °F (37.7 °C), temperature source Core, resp. rate 18, height 182.9 cm (72.01\"), weight 135 kg (297 lb 9.9 oz), SpO2 97 %.     Intake/Output Summary (Last 24 hours) at 06/05/18 1656  Last data filed at 06/05/18 1400   Gross per 24 hour   Intake           1520.8 ml   Output             2350 ml   Net           -829.2 ml       Physical Exam:  General: No apparent distress  Neck: no JVD.  Chest:No respiratory distress, breath sounds are diminished at bases, few scattered rhonchi.  Cardiovascular: Normal S1 and S2, no murmer, gallop or rub.    Extremities: trace edema.    Results Review:     I reviewed the patient's new clinical results.      Results from last 7 days  Lab Units 06/05/18  0342   WBC 10*3/mm3 12.83*   HEMOGLOBIN g/dL 8.7*   HEMATOCRIT % 27.6*   PLATELETS 10*3/mm3 181       Results from last 7 days  Lab Units 06/05/18  0342  06/04/18  0511   SODIUM mmol/L 138  < > 135   POTASSIUM mmol/L 4.8  < > 5.1   CHLORIDE mmol/L 109  < > 105   CO2 mmol/L 23.0  < > 22.0   BUN mg/dL 22  < > 22   CREATININE mg/dL 1.70*  < > 1.40*   CALCIUM mg/dL 8.1*  < > 8.3*   BILIRUBIN mg/dL  --   --  0.4   ALK PHOS U/L  --   --  120*   ALT (SGPT) U/L  --   --  37   AST (SGOT) U/L  --   --  35*   GLUCOSE mg/dL 133*  < > 98   < > = values in this interval not displayed.    Results from last 7 days  Lab Units " 06/05/18  0342 06/04/18  1203 06/04/18  0511   INR  1.02 1.05 0.96       Scheduled Meds:  aspirin 325 mg Oral Daily   atorvastatin 80 mg Oral Nightly   buPROPion  mg Oral Daily   cefuroxime 1.5 g Intravenous Q8H   chlorhexidine 15 mL Mouth/Throat Q12H   citalopram 40 mg Oral Daily   febuxostat 40 mg Oral Daily   levothyroxine 50 mcg Oral Q AM   metoprolol tartrate 12.5 mg Oral Q12H   metoprolol tartrate 2.5 mg Intravenous Q6H   pantoprazole 40 mg Intravenous Q AM   pharmacy consult - MTM  Does not apply Daily   polyethylene glycol 17 g Oral Daily   sennosides-docusate sodium 2 tablet Oral BID       Continuous Infusions:    niCARdipine 5-15 mg/hr    nitroglycerin 5-200 mcg/min    norepinephrine 0.02-0.3 mcg/kg/min Last Rate: 0.02 mcg/kg/min (06/05/18 1546)   phenylephrine 0.5-3 mcg/kg/min        Assessment and Plan:   1. CAD with recurrent NSTEMI  - Echo EF 40%  - s/p 2 vessel CABG today 6/4/18  per Dr. Leggett   - hemodynamically stable on low dose NE post-op, currently NSR  - continue routine post-op care   2. HLD  3. Tobacco abuse  4. KENNETH     Continue current treatment. He is already on long-acting morphine, further pain management per ICU team and Dr. Leggett.    ROBBIN Talavera  06/05/18  4:56 PM    I have seen and examined the patient, case was discussed with the physician extender, reviewed the above note, necessary changes were made and I agree with the final note.   Devorah Hightower MD, FACC, [facility]

## 2018-06-05 NOTE — PROGRESS NOTES
Intensivist Note     6/5/2018  Hospital Day: 7  1 Day Post-Op      Mr. Paulie Jordan, 37 y.o. male is followed for:  Principal Problem:    CAD with previous history of stents. Most recent stent 3/16/18  Active Problems:    NSTEMI 5/29/18 s/p thrombectomy and PTCA    S/P CABG x 2 on 6/4/18    KENNETH (acute kidney injury)    Hypertension    Dyslipidemia    History of leukemia    Neuropathy due to chemotherapeutic drug    Wheelchair bound    Tobacco abuse    Obesity       SUBJECTIVE     37-year-old debilitated wheelchair bound white male with a history of CAD, hypertension, hyperlipidemia, IBS, hypothyroidism on replacement, and leukemia initially diagnosed in 1998 with recurrence 2000. He is status post chemotherapy and radiation then bone marrow transplant 2000. This left him with a significant neuropathy and inability to ambulate.    He suffered a NSTEMI requiring cardiac catheterization and LAD stent placement 8/2016. Had a recurrent  NSTEMI 9/2017 for NSTEMI, but catheterization revealed only nonobstructive disease and a patent LAD stent. He then suffered a recurrent NSTEMI 2/2018 which was treated medically. His last NSTEMI occurred 3/2018 and repeat catheterization was performed. This suggested a culprit lesion for which he underwent PTCA and stent to the Ramus intermedius artery. Did well until 5/25/18 when he developed severe chest pain which responded to nitroglycerin.Pain recurred 5/28/18 and he presented to [facility] where he was again diagnosed with NSTEMI. Was transferred to [facility] 5/29/18 and underwent catheterization revealing occluded ostial LAD for which he underwent thrombectomy and angioplasty. His previous stent in the ostial ramus intermedius artery apparently remained patent. Because of residual disease 6/4/18 he underwent uneventful two-vessel CABG and was able to be weaned and extubated same day. Note that preoperative LVEF was 40% and postop cardiac index was 2.0. He is now off all  "pressors except for a minimal amount of norepinephrine. Continues to complain of significant pain probably related to the fact that he is on significant amounts of chronic narcotics at home and is less responsive here.    The patient's relevant past medical, surgical and social history were reviewed and updated in Epic as appropriate.    OBJECTIVE     /68   Pulse 74   Temp 98 °F (36.7 °C) (Oral)   Resp 24   Ht 182.9 cm (72.01\")   Wt 135 kg (297 lb 9.9 oz)   SpO2 92%   BMI 40.36 kg/m²   Flow (L/min): 4    Flowsheet Rows      First Filed Value   Admission Height  182.9 cm (72\") Documented at 05/29/2018 0100   Admission Weight  129 kg (284 lb) Documented at 05/29/2018 0100        Intake & Output (last day)       06/04 0701 - 06/05 0700 06/05 0701 - 06/06 0700    P.O.      I.V. (mL/kg) 2582.4 (19.1) 480.4 (3.6)    Blood 963     Total Intake(mL/kg) 3545.4 (26.3) 480.4 (3.6)    Urine (mL/kg/hr) 2900 (0.9) 470 (0.4)    Emesis/NG output 300 (0.1)     Chest Tube 415 (0.1) 110 (0.1)    Total Output 3615 580    Net -69.6 -99.6                Exam:  General Exam:  Middle-aged white male appearing older than stated age  HEENT: Pupils equal and reactive. Nose and throat clear.  Neck:                          Supple, no JVD, thyromegaly, or adenopathy. Right IJ [place] in place  Lungs: Clear anteriorly but diminished in bases  Chest:                          MT and left pleural tube in place with a total of 415 mL out over 24 hours  Cardiovascular: RRR without murmurs or gallops.  Abdomen: Soft nontender without organomegaly or masses.   and rectal: Hair catheter in place  Extremities: No cyanosis or clubbing. Evidence of chronic lymphedema. Right radial art line in place   Neurologic:                 Lower extremity weakness (is chronic). No focal deficits.    Chest X-Ray 6/5/18: Poor inspiration with small lung volumes. Bibasilar atelectasis/ small effusions and prominent vascular markings. Gastric bubble prominent. " Right IJ line in place    INFUSIONS  norepinephrine 0.02-0.3 mcg/kg/min Last Rate: 0.04 mcg/kg/min (06/05/18 0800)         Results from last 7 days  Lab Units 06/05/18  0342 06/04/18  1548 06/04/18  1203   WBC 10*3/mm3 12.83* 8.70 10.46   HEMOGLOBIN g/dL 8.7* 9.1* 9.3*   HEMATOCRIT % 27.6* 27.8* 28.5*   PLATELETS 10*3/mm3 181 162 134*       Results from last 7 days  Lab Units 06/05/18  0342 06/04/18  1548   SODIUM mmol/L 138 135   POTASSIUM mmol/L 4.8 4.1   CHLORIDE mmol/L 109 104   CO2 mmol/L 23.0 23.0   BUN mg/dL 22 19   CREATININE mg/dL 1.70* 1.40*   GLUCOSE mg/dL 133* 149*   CALCIUM mg/dL 8.1* 8.5*       Results from last 7 days  Lab Units 06/05/18  0342 06/04/18  1548 06/04/18  1203   MAGNESIUM mg/dL 2.4 2.7 2.7   PHOSPHORUS mg/dL 3.0 2.5 3.1       Results from last 7 days  Lab Units 06/04/18  0511   ALK PHOS U/L 120*   BILIRUBIN mg/dL 0.4   ALT (SGPT) U/L 37   AST (SGOT) U/L 35*       No results found for: SEDRATE  Lab Results   Component Value Date    BNP 42.0 05/29/2018     Lab Results   Component Value Date    CKTOTAL 132 03/16/2018    CKMB 11.03 (C) 03/16/2018    CKMBINDEX 8.4 (H) 03/16/2018    TROPONINI 18.261 (C) 05/29/2018     Lab Results   Component Value Date    TSH 4.607 02/03/2018     No results found for: LACTATE  No results found for: CORTISOL      Results from last 7 days  Lab Units 06/04/18 2007 06/04/18  1926 06/04/18  1848 06/04/18  1739 06/04/18  1650   PH, ARTERIAL pH units 7.343* 7.347* 7.331* 7.274* 7.248*   PCO2, ARTERIAL mm Hg 44.2 43.3 45.0 49.1* 51.7*   PO2 ART mm Hg 93.3 102.0 69.0* 68.2* 81.4*   HCO3 ART mmol/L 24.0 23.8 23.7 22.8 22.5   FIO2 % 40 21 40 40 40         I reviewed the patient's results, images and medication.    Assessment/Plan   ASSESSMENT      Principal Problem:    CAD with previous history of stents. Most recent stent 3/16/18  Active Problems:    NSTEMI 5/29/18 s/p thrombectomy and PTCA    S/P CABG x 2 on 6/4/18    KENNETH (acute kidney injury)    Hypertension     Dyslipidemia    History of leukemia    Neuropathy due to chemotherapeutic drug    Wheelchair bound    Tobacco abuse    Obesity      DISCUSSION:Is doing reasonably well and pressors are being weaned. Pain seems excessive and we will try to help him with this. He does however have chronic pain making it more difficult.    PLAN     1. Mobilize and get physical therapy involved  2. [place] arterial line and Hair are being removed  3. Continue ulcer and DVT prophylaxis  4. Discontinue insulin drip and change to sliding scale insulin as needed  5. Resume his home MS Contin and back off IV morphine.    Plan of care and goals reviewed with mulitdisciplinary team at daily rounds.    I discussed the patient's findings and my recommendations with patient and nursing staff    Time spent Critical care 35 min (It does not include procedure time).    Reji Villar MD  Intensive Care Medicine  06/05/18 4:51 PM

## 2018-06-05 NOTE — PLAN OF CARE
Problem: Patient Care Overview  Goal: Plan of Care Review  Outcome: Ongoing (interventions implemented as appropriate)   06/05/18 1720   Coping/Psychosocial   Plan of Care Reviewed With patient;father   Plan of Care Review   Progress improving   OTHER   Outcome Summary Pt up to chair with sling, x2, stood max assist with PT. weaning levo gtts, transitioned off inuslin gtts. Pulmonary toileting Q1       Problem: Cardiac: ACS (Acute Coronary Syndrome) (Adult)  Goal: Signs and Symptoms of Listed Potential Problems Will be Absent, Minimized or Managed (Cardiac: ACS)  Outcome: Ongoing (interventions implemented as appropriate)   06/05/18 1720   Goal/Outcome Evaluation   Problems Assessed (Acute Coronary Syndrome) all   Problems Present (Acute Coronary Syn) situational response       Problem: Fall Risk (Adult)  Goal: Identify Related Risk Factors and Signs and Symptoms  Outcome: Ongoing (interventions implemented as appropriate)   06/05/18 1720   Fall Risk (Adult)   Related Risk Factors (Fall Risk) gait/mobility problems;polypharmacy   Signs and Symptoms (Fall Risk) presence of risk factors     Goal: Absence of Fall  Outcome: Ongoing (interventions implemented as appropriate)   06/05/18 1720   Fall Risk (Adult)   Absence of Fall making progress toward outcome       Problem: Skin Injury Risk (Adult)  Goal: Identify Related Risk Factors and Signs and Symptoms  Outcome: Ongoing (interventions implemented as appropriate)   06/05/18 1720   Skin Injury Risk (Adult)   Related Risk Factors (Skin Injury Risk) critical care admission;mobility impaired;edema;body weight extremes       Problem: Cardiac Surgery (Adult)  Goal: Signs and Symptoms of Listed Potential Problems Will be Absent, Minimized or Managed (Cardiac Surgery)  Outcome: Ongoing (interventions implemented as appropriate)   06/05/18 1720   Goal/Outcome Evaluation   Problems Assessed (Cardiac Surgery) all   Problems Present (Cardiac Surgery) situational  response;pain;bowel motility decreased

## 2018-06-05 NOTE — THERAPY EVALUATION
Acute Care - Physical Therapy Initial Evaluation  [facility]     Patient Name: Paulie Jordan  : 1980  MRN: [id number]  Today's Date: 2018   Onset of Illness/Injury or Date of Surgery: (P) 18  Date of Referral to PT: (P) 18  Referring Physician: ROBBIN Escalante (P)      Admit Date: 2018    Visit Dx:     ICD-10-CM ICD-9-CM   1. Coronary artery disease involving native coronary artery of native heart with angina pectoris I25.119 414.01     413.9   2. NSTEMI (non-ST elevated myocardial infarction) I21.4 410.70   3. Impaired functional mobility, balance, gait, and endurance Z74.09 V49.89     Patient Active Problem List   Diagnosis   • Hypertension   • Coronary artery disease involving native coronary artery of native heart   • Dyslipidemia   • History of leukemia   • Neuropathy due to chemotherapeutic drug   • Tobacco abuse   • Obesity   • NSTEMI (non-ST elevated myocardial infarction)   • KENNETH (acute kidney injury)   • Wheelchair bound     Past Medical History:   Diagnosis Date   • Chronic pain    • Gout    • Hypercholesterolemia    • Hypertension    • Leukemia     dx age 18 and relapsed age 19   • Leukemia    • MI, old        • 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     Past Surgical History:   Procedure Laterality Date   • BONE MARROW TRANSPLANT     • CARDIAC CATHETERIZATION N/A 2016    Procedure: Left Heart Cath;  Surgeon: Devorah Hightower MD;  Location:  [facility];  Service:    • CARDIAC CATHETERIZATION N/A 2017    Procedure: Left Heart Cath;  Surgeon: Devorah Hightower MD;  Location:  [facility];  Service:    • CARDIAC CATHETERIZATION N/A 3/16/2018    Procedure: Left Heart Cath;  Surgeon: Bryon Munguia MD;  Location:  [facility];  Service: Cardiology   • CARDIAC CATHETERIZATION N/A 2018    Procedure: Left Heart Cath;  Surgeon: Bryon Triana MD;  Location:  [facility];  Service: Cardiovascular    • CORONARY ARTERY BYPASS GRAFT N/A 6/4/2018    Procedure: MEDIAN STERNOTOMY, CORONARY ARTERY BYPASS X  2 WITH INTERNAL MAMMARY ARTERY GRAFT, EVH OF THE RIGHT GREATER SAPHENOUS VEIN AND EXPLORATION OF THE LEFT LEG;  Surgeon: Ramesh Leggett MD;  Location: [facility] OR;  Service: Cardiothoracic   • CORONARY STENT PLACEMENT          PT ASSESSMENT (last 12 hours)      Physical Therapy Evaluation     Row Name 06/05/18 0911          PT Evaluation Time/Intention    Subjective Information (P)  complains of;weakness;fatigue;pain  -JOHNNY     Document Type (P)  evaluation  -JOHNNY     Mode of Treatment (P)  physical therapy  -JOHNNY     Patient Effort (P)  adequate  -JOHNNY     Symptoms Noted During/After Treatment (P)  fatigue;increased pain  -JOHNNY     Comment (P)  Pt req max encouragement to perform functional mobility tasks.  -JOHNNY     Row Name 06/05/18 0911          General Information    Patient Profile Reviewed? (P)  yes  -JOHNNY     Onset of Illness/Injury or Date of Surgery (P)  05/29/18  -JOHNNY     Referring Physician (P)  ROBBIN Escalante  -JOHNNY     Patient Observations (P)  alert;agree to therapy  -JOHNNY     Patient/Family Observations (P)  Father present in room.  -JOHNNY     Prior Level of Function (P)  independent:;transfer;w/c or scooter;bed mobility;dressing;bathing  -JOHNNY     Equipment Currently Used at Home (P)  wheelchair;wheelchair, motorized   Independent with manual w/c; uses majority of the time.   -JOHNNY     Pertinent History of Current Functional Problem (P)  Pt presents to ED on 5/29/18 with complaints of chest pain and BUE pain. S/p CABG x 2 with EVH 6/4/18. Pertinent PMHx include: previous MI x 2 and leukemia with secondary LE neuropathy.  -JOHNNY     Existing Precautions/Restrictions (P)  cardiac;fall;oxygen therapy device and L/min;sternal   1 chest tube  -JOHNNY     Limitations/Impairments (P)  insensate body part  -JOHNNY     Risks Reviewed (P)  patient and family:;LOB;increased discomfort;increased drainage  -JOHNNY     Benefits Reviewed (P)  patient  and family:;improve function;increase independence;increase strength;increase balance;decrease pain;decrease risk of DVT;increase knowledge  -     Barriers to Rehab (P)  previous functional deficit  -[facility] Name 06/05/18 0911          Relationship/Environment    Lives With (P)  alone  -     Family Caregiver if Needed (P)  parent(s)  -[facility] Name 06/05/18 0911          Resource/Environmental Concerns    Current Living Arrangements (P)  home/apartment/condo  -[facility] Name 06/05/18 0911          Cognitive Assessment/Interventions    Additional Documentation (P)  Cognitive Assessment/Intervention (Group)  -[facility] Name 06/05/18 0911          Cognitive Assessment/Intervention- PT/OT    Affect/Mental Status (Cognitive) (P)  agitated  -     Orientation Status (Cognition) (P)  oriented x 4  -JOHNNY     Follows Commands (Cognition) (P)  follows one step commands;over 90% accuracy;verbal cues/prompting required  -     Cognitive Function (Cognitive) (P)  safety deficit  -     Safety Deficit (Cognitive) (P)  mild deficit;awareness of need for assistance;safety precautions awareness;safety precautions follow-through/compliance  -     Personal Safety Interventions (P)  fall prevention program maintained;gait belt;nonskid shoes/slippers when out of bed  -     Cognitive Assessment/Intervention Comment (P)  Pt hestitant to perform functional mobility with therapy due to doubts in physical abilities. Req max encouragement and explanation of importance of movement to return to PLOF.  -[facility] Name 06/05/18 0911          Safety Issues, Functional Mobility    Safety Issues Affecting Function (Mobility) (P)  ability to follow commands;awareness of need for assistance;safety precaution awareness;safety precautions follow-through/compliance  -     Impairments Affecting Function (Mobility) (P)  balance;endurance/activity tolerance;pain;strength  -[facility] Name 06/05/18 0911          Bed Mobility  Assessment/Treatment    Comment (Bed Mobility) (P)  UIC  -     Row Name 06/05/18 0911          Transfer Assessment/Treatment    Transfer Assessment/Treatment (P)  sit-stand transfer;stand-sit transfer  -     Comment (Transfers) (P)  STS x 2; first trial stood approximately 3/4 the way to standing; second trial pt cleared bottom only to reposition farther back in chair.  -JOHNNY     Sit-Stand [place] (Transfers) (P)  maximum assist (25% patient effort);other (see comments)   x 3 person assist  -     Stand-Sit [place] (Transfers) (P)  maximum assist (25% patient effort);other (see comments)   x 3 person support  -     Row Name 06/05/18 0911          Sit-Stand Transfer    Assistive Device (Sit-Stand Transfers) (P)  other (see comments)   BUE support  -     Row Name 06/05/18 0911          Stand-Sit Transfer    Assistive Device (Stand-Sit Transfers) (P)  other (see comments)   BUE support  -     Row Name 06/05/18 0911          Gait/Stairs Assessment/Training    Comment (Gait/Stairs) (P)  Not applicable for patient due to PLOF.  -     Row Name 06/05/18 0911          General ROM    RT Lower Ext (P)  Rt Ankle Dorsiflexion;Rt Ankle Plantarflexion;Comment   Unable to perform AROM of DF/PF, lacking full DF of R ankle  -JOHNNY     LT Lower Ext (P)  Lt Ankle Dorsiflexion;Lt Ankle Plantarflexion;Comment   Unable to perform AROM of DF/PF, lacking full DF of L ankle  -JOHNNY     GENERAL ROM COMMENTS (P)  BUE WFL  -     Row Name 06/05/18 0911          General Assessment (Manual Muscle Testing)    Comment, General Manual Muscle Testing (MMT) Assessment (P)  Grossly 3+/5 for B knee, hip. Grossly 0/5 of B ankle.  -     Row Name 06/05/18 0911          Motor Assessment/Intervention    Additional Documentation (P)  Balance (Group)  -     Row Name 06/05/18 0911          Balance    Balance (P)  static sitting balance;static standing balance  -[facility] Name 06/05/18 0911          Static Sitting Balance    Level of  [place] (Unsupported Sitting, Static Balance) (P)  contact guard assist  -JOHNNY     Sitting Position (Unsupported Sitting, Static Balance) (P)  sitting in chair  -JOHNNY     Time Able to Maintain Position (Unsupported Sitting, Static Balance) (P)  1 to 2 minutes  -JOHNNY     Level of [place] (Supported Sitting, Static Balance) (P)  independent  -JOHNNY     Sitting Position (Supported Sitting, Static Balance) (P)  sitting in chair  -JOHNNY     Time Able to Maintain Position (Supported Sitting, Static Balance) (P)  2 to 3 minutes  -JOHNNY     Row Name 06/05/18 0911          Static Standing Balance    Level of [place] (Supported Standing, Static Balance) (P)  maximal assist, 25 to 49% patient effort;other (see comments)   x3 person assist  -JOHNNY     Time Able to Maintain Position (Supported Standing, Static Balance) (P)  less than 15 seconds  -JOHNNY     Assistive Device Utilized (Supported Standing, Static Balance) (P)  --   BUE support  -     Row Name 06/05/18 0911          Sensory Assessment/Intervention    Sensory General Assessment (P)  light touch sensation deficits identified   B ankle involvement; previous hx of neuropathy  -     Row Name 06/05/18 0911          Pain Assessment    Additional Documentation (P)  Pain Scale: FACES Pre/Post-Treatment (Group)  -     Row Name 06/05/18 0911          Pain Scale: Numbers Pre/Post-Treatment    Pain Location - Orientation (P)  incisional  -JOHNNY     Pain Location (P)  chest  -JOHNNY     Pain Intervention(s) (P)  Repositioned;Ambulation/increased activity;Rest;Splinting  -     Row Name 06/05/18 0911          Pain Scale: FACES Pre/Post-Treatment    Pain: FACES Scale, Pretreatment (P)  6-->hurts even more  -JOHNNY     Pain: FACES Scale, Post-Treatment (P)  8-->hurts whole lot  -JOHNNY     Pre/Post Treatment Pain Comment (P)  Pt complained of discomfort throughout therapy session. Attempted to reposition patient in chair to assist with patient discomfort at end of session.  -     Row Name              Wound 06/04/18 0826 chest incision    Wound - Properties Group Date first assessed: 06/04/18  -SH Time first assessed: 0826  -SH Location: chest  -SH Type: incision  -SH    Row Name             Wound 06/04/18 0917 Right leg incision    Wound - Properties Group Date first assessed: 06/04/18  -SH Time first assessed: 0917  -SH Side: Right  -SH Location: leg  -SH Type: incision  -SH    Row Name             Wound 06/04/18 1454 Left surgical    Wound - Properties Group Date first assessed: 06/04/18  -JBA Time first assessed: 1454  -JBA Side: Left  -JBA Type: surgical  -JBA    Row Name 06/05/18 0911          Coping    Observed Emotional State (P)  agitated;flat  -JOHNNY     Verbalized Emotional State (P)  acceptance  -JOHNNY     Row Name 06/05/18 0911          Plan of Care Review    Plan of Care Reviewed With (P)  patient;family  -JOHNNY     Row Name 06/05/18 0911          Physical Therapy Clinical Impression    Date of Referral to PT (P)  06/04/18  -JOHNNY     PT Diagnosis (PT Clinical Impression) (P)  Impaired functional mobility including bed mobility, transfers, and endurance.  -JOHNNY     Functional Level at Time of Evaluation (PT Clinical Impression) (P)  Independent with t/fs, wheelchair bound  -JOHNNY     Patient/Family Goals Statement (PT Clinical Impression) (P)  Return home and to PLOF  -JOHNNY     Criteria for Skilled Interventions Met (PT Clinical Impression) (P)  yes;treatment indicated  -JOHNNY     Rehab Potential (PT Clinical Summary) (P)  good, to achieve stated therapy goals  -JOHNNY     Care Plan Review (PT) (P)  evaluation/treatment results reviewed;care plan/treatment goals reviewed;risks/benefits reviewed;patient/other agree to care plan  -JOHNNY     Care Plan Review, Other Participant (PT Clinical Impression) (P)  family  -JOHNNY     Row Name 06/05/18 0911          Vital Signs    Pre Systolic BP Rehab (P)  100  -JOHNNY     Pre Treatment Diastolic BP (P)  78  -JOHNNY     Post Systolic BP Rehab (P)  114  -JOHNNY     Post Treatment Diastolic BP (P)   62  -JOHNNY     Pretreatment Heart Rate (beats/min) (P)  70  -JOHNNY     Posttreatment Heart Rate (beats/min) (P)  74  -JOHNNY     Pre SpO2 (%) (P)  95  -JOHNNY     O2 Delivery Pre Treatment (P)  supplemental O2   4L, NC  -JOHNNY     Post SpO2 (%) (P)  94  -JOHNNY     O2 Delivery Post Treatment (P)  supplemental O2   4 L NC  -JOHNNY     Pre Patient Position (P)  Sitting  -JOHNNY     Intra Patient Position (P)  Standing  -JOHNNY     Post Patient Position (P)  Sitting  -JOHNNY     Recovery Time (P)  5  -JOHNNY     Row Name 06/05/18 0911          Physical Therapy Goals    Bed Mobility Goal Selection (PT) (P)  bed mobility, PT goal 1  -JOHNNY     Transfer Goal Selection (PT) (P)  transfer, PT goal 1  -JOHNNY     Row Name 06/05/18 0911          Bed Mobility Goal 1 (PT)    Activity/Assistive Device (Bed Mobility Goal 1, PT) (P)  sit to supine/supine to sit  -JOHNNY     [place] Level/Cues Needed (Bed Mobility Goal 1, PT) (P)  independent  -JOHNNY     Time Frame (Bed Mobility Goal 1, PT) (P)  long term goal (LTG);10 days  -JOHNNY     Barriers (Bed Mobility Goal 1, PT) (P)  PLOF  -JOHNNY     Progress/Outcomes (Bed Mobility Goal 1, PT) (P)  goal ongoing  -JOHNNY     Row Name 06/05/18 0911          Transfer Goal 1 (PT)    Activity/Assistive Device (Transfer Goal 1, PT) (P)  sit-to-stand/stand-to-sit;bed-to-chair/chair-to-bed;wheelchair transfer  -JOHNNY     [place] Level/Cues Needed (Transfer Goal 1, PT) (P)  independent  -JOHNNY     Time Frame (Transfer Goal 1, PT) (P)  long term goal (LTG);10 days  -JOHNNY     Barriers (Transfers Goal 1, PT) (P)  PLOF  -JOHNNY     Progress/Outcome (Transfer Goal 1, PT) (P)  goal ongoing  -JOHNNY     Row Name 06/05/18 0911          Patient Education Goal (PT)    Activity (Patient Education Goal, PT) (P)  HEP  -JOHNNY     [place]/Cues/Accuracy (Memory Goal 2, PT) (P)  verbalizes understanding;independent  -JOHNNY     Time Frame (Patient Education Goal, PT) (P)  long term goal (LTG);10 days  -JOHNNY     Progress/Outcome (Patient Education Goal, PT) (P)  goal ongoing  -JOHNNY     Row  Name 06/05/18 0911          Positioning and Restraints    Pre-Treatment Position (P)  sitting in chair/recliner  -JOHNNY     Post Treatment Position (P)  chair  -JOHNNY     In Chair (P)  notified nsg;sitting;call light within reach;encouraged to call for assist;waffle cushion  -JOHNNY       User Key  (r) = Recorded By, (t) = Taken By, (c) = Cosigned By    Initials Name Provider Type     Sherri L Haase, RN Registered Nurse    SULY Bryson, HONEY Registered Nurse    JOHNNY Workman, PT Student PT Student          Physical Therapy Education     Title: PT OT SLP Therapies (Active)     Topic: Physical Therapy (Active)     Point: Mobility training (Active)    Learning Progress Summary     Learner Status Readiness Method Response Comment Documented by    Patient Active Acceptance E,D NR   06/05/18 1013    Father Active Acceptance E,D NR   06/05/18 1013          Point: Home exercise program (Active)    Learning Progress Summary     Learner Status Readiness Method Response Comment Documented by    Patient Active Acceptance E,D NR  JOHNNY 06/05/18 1013    Father Active Acceptance E,D NR   06/05/18 1013          Point: Body mechanics (Active)    Learning Progress Summary     Learner Status Readiness Method Response Comment Documented by    Patient Active Acceptance E,D NR  JOHNNY 06/05/18 1013    Father Active Acceptance E,D NR   06/05/18 1013          Point: Precautions (Active)    Learning Progress Summary     Learner Status Readiness Method Response Comment Documented by    Patient Active Acceptance E,D NR  JOHNNY 06/05/18 1013    Father Active Acceptance E,D NR   06/05/18 1013                      User Key     Initials Effective Dates Name Provider Type [facility] 05/15/18 -  Ki Workman, PT Student PT Student PT                PT Recommendation and Plan  Anticipated Discharge Disposition (PT): (P) home with assist, home with home health  Planned Therapy Interventions (PT Eval): (P) balance training, bed mobility  training, home exercise program, strengthening, transfer training, wheelchair management/propulsion training  Therapy Frequency (PT Clinical Impression): (P) daily  Outcome Summary/Treatment Plan (PT)  Anticipated Discharge Disposition (PT): (P) home with assist, home with home health  Plan of Care Reviewed With: (P) patient, father  Outcome Summary: (P) Initial PT evaluation completed this session. Pt able to perform STS x2 with max A x 3, but was unable to come to full upright posture. Pt is limited by increased pain at surgical site, PLOF, and belief in his post-surgical functional abilities. Pt req max encouragement to perform functional mobility tasks. Pt would benefit from skilled PT services according to PT POC. PT recommendation for discharge destination is home with assist/home health.           Outcome Measures     Row Name 06/05/18 1000             How much help from another person do you currently need...    Turning from your back to your side while in flat bed without using bedrails? (P)  1  -JOHNNY      Moving from lying on back to sitting on the side of a flat bed without bedrails? (P)  1  -JOHNNY      Moving to and from a bed to a chair (including a wheelchair)? (P)  1  -JOHNNY      Standing up from a chair using your arms (e.g., wheelchair, bedside chair)? (P)  2  -JOHNNY      Climbing 3-5 steps with a railing? (P)  1  -JOHNNY      To walk in hospital room? (P)  1  -JOHNNY      AM-PAC 6 Clicks Score (P)  7  -JOHNNY         Functional Assessment    Outcome Measure Options (P)  AM-PAC 6 Clicks Basic Mobility (PT)  -JOHNNY        User Key  (r) = Recorded By, (t) = Taken By, (c) = Cosigned By    Initials Name Provider Type    JOHNNY Workman, PT Student PT Student           Time Calculation:         PT Charges     Row Name 06/05/18 1018             Time Calculation    Start Time (P)  0911  -JOHNNY      PT Received On (P)  06/05/18  -JOHNNY      PT Goal Re-Cert Due Date (P)  06/15/18  -JOHNNY        User Key  (r) = Recorded By, (t) = Taken By,  (c) = Cosigned By    Initials Name Provider Type    JOHNNY Ki Workman, PT Student PT Student          Therapy Charges for Today     Code Description Service Date Service Provider Modifiers Qty    11135715154 HC PT EVAL HIGH COMPLEXITY 4 6/5/2018 Ki Workman, PT Student GP 1          PT G-Codes  Outcome Measure Options: (P) AM-PAC 6 Clicks Basic Mobility (PT)      Ki Workman, PT Student  6/5/2018

## 2018-06-05 NOTE — PROGRESS NOTES
"Paulie Jordan  [id number]  1980     LOS: 7 days   Patient Care Team:  Cesar Neri MD as PCP - General  Cesar Neri MD as PCP - Family Medicine  Cesar Neri MD as PCP - Claims Attributed  Ernestine Barajas RN as Care Coordinator (Population Health)    Chief Complaint: coronary artery disease      Subjective:  Incisional  pain    Objective:     Vital Sign Min/Max for last 24 hours  Temp  Min: 95.2 °F (35.1 °C)  Max: 102.4 °F (39.1 °C)   BP  Min: 84/72  Max: 121/80   Pulse  Min: 72  Max: 90   Resp  Min: 6  Max: 31   SpO2  Min: 89 %  Max: 100 %   No Data Recorded   Weight  Min: 135 kg (297 lb 9.9 oz)  Max: 135 kg (297 lb 9.9 oz)     Flowsheet Rows      First Filed Value   Admission Height  182.9 cm (72\") Documented at 05/29/2018 0100   Admission Weight  129 kg (284 lb) Documented at 05/29/2018 0100          Physical Exam:    Wound: satisfactory    Pulses:     Mediastinal and Chest Tube Drainage:       Results Review:     Results from last 7 days  Lab Units 06/05/18  0342   WBC 10*3/mm3 12.83*   HEMOGLOBIN g/dL 8.7*   HEMATOCRIT % 27.6*   PLATELETS 10*3/mm3 181       Results from last 7 days  Lab Units 06/05/18  0342   SODIUM mmol/L 138   POTASSIUM mmol/L 4.8   CHLORIDE mmol/L 109   CO2 mmol/L 23.0   BUN mg/dL 22   CREATININE mg/dL 1.70*   GLUCOSE mg/dL 133*   CALCIUM mg/dL 8.1*       Results from last 7 days  Lab Units 06/04/18 2007   PH, ARTERIAL pH units 7.343*   PO2 ART mm Hg 93.3   PCO2, ARTERIAL mm Hg 44.2   HCO3 ART mmol/L 24.0         Assessment    Principal Problem:    NSTEMI (non-ST elevated myocardial infarction)  Active Problems:    Hypertension    Coronary artery disease involving native coronary artery of native heart    Dyslipidemia    History of leukemia    Neuropathy due to chemotherapeutic drug    Tobacco abuse    Obesity    KENNETH (acute kidney injury)    Coronary artery disease involving native coronary artery of native heart with angina pectoris    Wheelchair bound       DC " [place]-albert catheter, artrial line, Hair cathete        Ramesh Leggett MD  06/05/18  6:30 AM      Please note that portions of this note were completed with a voice recognition program. Efforts were made to edit the dictations, but words may be mistranscribed

## 2018-06-05 NOTE — PROGRESS NOTES
"                  Clinical Nutrition     Nutrition Assessment  Reason for Visit:   MDR, LOS  7 days     Patient Name: Paulie Jordan  YOB: 1980  MRN: [id number]  Date of Encounter: 06/05/18 11:45 AM  Admission date: 5/29/2018    Nutrition Assessment     Hospital Problem List  Principal Problem:    NSTEMI (non-ST elevated myocardial infarction)  Active Problems:    Hypertension    Coronary artery disease involving native coronary artery of native heart    Dyslipidemia    History of leukemia    Neuropathy due to chemotherapeutic drug    Tobacco abuse    Obesity    KENNETH (acute kidney injury)    Wheelchair bound      PMH: He  has a past medical history of Chronic pain; Gout; Hypercholesterolemia; Hypertension; Leukemia; Leukemia; MI, old; and Nerve damage.   PSxH: He  has a past surgical history that includes Bone marrow transplant; Cardiac catheterization (N/A, 8/22/2016); Coronary stent placement; Cardiac catheterization (N/A, 9/23/2017); Cardiac catheterization (N/A, 3/16/2018); Cardiac catheterization (N/A, 5/29/2018); and Coronary artery bypass graft (N/A, 6/4/2018).     Other Nutrition Related Factors:  S/P CABG x 2 6/4    Reported/Observed/Food/Nutrition Related History:     Clear liquid started this morning.  Good appetite prior to surgery     Anthropometrics     Height: 182.9 cm (72.01\")  Last filed wt: Weight: 135 kg (297 lb 9.9 oz) (06/04/18 1205)  Weight Method: Stated    BMI: BMI (Calculated): 40.4  Obese Class III extreme obesity: > or equal to 40kg/m2    Ideal Body Weight (IBW) (kg): 82.09    Labs reviewed       Results from last 7 days  Lab Units 06/05/18  0342  06/04/18  0511   SODIUM mmol/L 138  < > 135   POTASSIUM mmol/L 4.8  < > 5.1   CHLORIDE mmol/L 109  < > 105   CO2 mmol/L 23.0  < > 22.0   BUN mg/dL 22  < > 22   CREATININE mg/dL 1.70*  < > 1.40*   CALCIUM mg/dL 8.1*  < > 8.3*   BILIRUBIN mg/dL  --   --  0.4   ALK PHOS U/L  --   --  120*   ALT (SGPT) U/L  --   --  37   AST (SGOT) U/L  " --   --  35*   GLUCOSE mg/dL 133*  < > 98   < > = values in this interval not displayed.      Results from last 7 days  Lab Units 06/05/18  1057 06/05/18  1022 06/05/18  0918 06/05/18  0802 06/05/18  0703 06/05/18  0510   GLUCOSE mg/dL 143* 136* 142* 142* 148* 142*       Lab Results  Lab Value Date/Time   HGBA1C 5.70 (H) 05/29/2018 0434   HGBA1C 6.10 (H) 03/16/2018 0345     Medications reviewed   Pertinent:  Senokot, Miralax, Synthroid,     Current Nutrition Prescription     PO: Diet Clear Liquid    Intake:  isuf data post surgery.  100% of meals prior to surgery     Nutrition Diagnosis     Problem  Nutrition Diagnoses Problem 1   Problem 1:            Predicted suboptimal energy intake      Etiology (related to): Clinical status    Signs/Symptoms: S/P CABG; current diet order    (evidence by)    Nutrition Intervention     Interventions Goal    General: Nutrition support treatment    Nutrition Interventions   1.  Follow treatment progress, Care plan reviewed, Advance diet as tolerated.     Monitor/ Evaluation    Per protocol, PO intake, Pertinent labs      Will Continue to follow per protocol      Felisha Aranda RD  Time Spent: 20min

## 2018-06-06 NOTE — PROGRESS NOTES
Intensivist Note     6/6/2018  Hospital Day: 8  2 Days Post-Op      Mr. Paulie Jordan, 38 y.o. male is followed for:  Principal Problem:    CAD with previous history of stents. Most recent stent 3/16/18  Active Problems:    NSTEMI 5/29/18 s/p thrombectomy and PTCA    S/P CABG x 2 on 6/4/18    KENNETH (acute kidney injury)    Hypertension    Dyslipidemia    History of leukemia    Neuropathy due to chemotherapeutic drug    Wheelchair bound    Tobacco abuse    Obesity       SUBJECTIVE     37-year-old debilitated wheelchair bound white male with a history of CAD, hypertension, hyperlipidemia, IBS, hypothyroidism on replacement, and leukemia initially diagnosed in 1998 with recurrence 2000. He is status post chemotherapy and radiation then bone marrow transplant 2000. This left him with a significant neuropathy and inability to ambulate.     He suffered a NSTEMI requiring cardiac catheterization and LAD stent placement 8/2016. Had a recurrent  NSTEMI 9/2017 for NSTEMI, but catheterization revealed only nonobstructive disease and a patent LAD stent. He then suffered a recurrent NSTEMI 2/2018 which was treated medically. His last NSTEMI occurred 3/2018 and repeat catheterization was performed. This suggested a culprit lesion for which he underwent PTCA and stent to the Ramus intermedius artery. Did well until 5/25/18 when he developed severe chest pain which responded to nitroglycerin.Pain recurred 5/28/18 and he presented to [facility] ER where he was again diagnosed with NSTEMI. Was transferred to [facility] 5/29/18 and underwent catheterization revealing occluded ostial LAD for which he underwent thrombectomy and angioplasty. His previous stent in the ostial ramus intermedius artery apparently remained patent. Because of residual disease 6/4/18 he underwent uneventful two-vessel CABG and was able to be weaned and extubated same day. Note that preoperative LVEF was 40% and postop cardiac index was 2.0.     Patient came off  "all pressors 6/5/18.  MT tubes were removed today but continues to complain of significant pain probably related to the fact that he is on significant amounts of chronic narcotics at home and is less responsive here. We have again adjusted his medications today to try to obtain appropriate relief. Unfortunately with his acute renal injury, cannot use tramadol. Pain has led to splinting and subsequent under ventilation with associated atelectasis and higher FiO2 requirement. Remains on 4-6 L. BiPAP was ordered but did not wish to wear it.    The patient's relevant past medical, surgical and social history were reviewed and updated in Epic as appropriate.    OBJECTIVE     BP 95/64   Pulse 75   Temp 98.1 °F (36.7 °C) (Oral)   Resp 20   Ht 182.9 cm (72.01\")   Wt 135 kg (297 lb 9.9 oz)   SpO2 97%   BMI 40.36 kg/m²   Flow (L/min): 4    Flowsheet Rows      First Filed Value   Admission Height  182.9 cm (72\") Documented at 05/29/2018 0100   Admission Weight  129 kg (284 lb) Documented at 05/29/2018 0100        Intake & Output (last day)       06/05 0701 - 06/06 0700 06/06 0701 - 06/07 0700    P.O.  360    I.V. (mL/kg) 776.4 (5.8)     Blood      IV Piggyback 100     Total Intake(mL/kg) 876.4 (6.5) 360 (2.7)    Urine (mL/kg/hr) 1340 (0.4) 225 (0.2)    Emesis/NG output      Chest Tube 250 (0.1)     Total Output 1590 225    Net -713.6 +135                Exam:  General Exam:  Middle-aged white male appearing older than stated age. Chronically ill appearing  HEENT: Pupils equal and reactive. Nose and throat clear.  Neck:                          Supple, no JVD, thyromegaly, or adenopathy right IJ line in place.  Lungs: Clear to auscultation and percussion anteriorly and posteriorly.  Cardiovascular: Regular rate and rhythm without murmurs or gallops. HR 77  Abdomen: Soft nontender without organomegaly or masses. Obese   and rectal: Deferred.  Extremities: No cyanosis or clubbing but trace to 1+ lower extremity edema. " Oriented ×3   Neurologic:                 Symmetric strength. No focal deficits.    Chest X-Ray 6/6/18: Still with poor inspiration and small lung volumes but left lung appears clear her. Persistent atelectasis and elevated right hemidiaphragm right base. Could not rule out some degree of underlying effusion.    INFUSIONS  norepinephrine 0.02-0.3 mcg/kg/min         Results from last 7 days  Lab Units 06/06/18  0350 06/05/18  0342 06/04/18  1548   WBC 10*3/mm3 10.45 12.83* 8.70   HEMOGLOBIN g/dL 8.2* 8.7* 9.1*   HEMATOCRIT % 26.1* 27.6* 27.8*   PLATELETS 10*3/mm3 138* 181 162       Results from last 7 days  Lab Units 06/06/18  0350 06/05/18  0342   SODIUM mmol/L 137 138   POTASSIUM mmol/L 4.4 4.8   CHLORIDE mmol/L 106 109   CO2 mmol/L 27.0 23.0   BUN mg/dL 24* 22   CREATININE mg/dL 1.69* 1.70*   GLUCOSE mg/dL 116* 133*   CALCIUM mg/dL 8.4* 8.1*       Results from last 7 days  Lab Units 06/06/18  0350 06/05/18  0342 06/04/18  1548   MAGNESIUM mg/dL 2.4 2.4 2.7   PHOSPHORUS mg/dL 3.7 3.0 2.5       Results from last 7 days  Lab Units 06/04/18  0511   ALK PHOS U/L 120*   BILIRUBIN mg/dL 0.4   ALT (SGPT) U/L 37   AST (SGOT) U/L 35*       No results found for: SEDRATE  Lab Results   Component Value Date    BNP 42.0 05/29/2018     Lab Results   Component Value Date    CKTOTAL 132 03/16/2018    CKMB 11.03 (C) 03/16/2018    CKMBINDEX 8.4 (H) 03/16/2018    TROPONINI 18.261 (C) 05/29/2018     Lab Results   Component Value Date    TSH 4.607 02/03/2018     No results found for: LACTATE  No results found for: CORTISOL      Results from last 7 days  Lab Units 06/04/18 2007 06/04/18  1926 06/04/18  1848 06/04/18  1739 06/04/18  1650   PH, ARTERIAL pH units 7.343* 7.347* 7.331* 7.274* 7.248*   PCO2, ARTERIAL mm Hg 44.2 43.3 45.0 49.1* 51.7*   PO2 ART mm Hg 93.3 102.0 69.0* 68.2* 81.4*   HCO3 ART mmol/L 24.0 23.8 23.7 22.8 22.5   FIO2 % 40 21 40 40 40       I reviewed the patient's results, images and medication.    Assessment/Plan    ASSESSMENT      Principal Problem:    CAD with previous history of stents. Most recent stent 3/16/18  Active Problems:    NSTEMI 5/29/18 s/p thrombectomy and PTCA    S/P CABG x 2 on 6/4/18    KENNETH (acute kidney injury)    Hypertension    Dyslipidemia    History of leukemia    Neuropathy due to chemotherapeutic drug    Wheelchair bound    Tobacco abuse    Obesity      DISCUSSION: Would like to get his pain under better control, but he needs to work with this more on mobilization before I can give him a lot more narcotics. He already underventilate's and does very poorly with incentive spirometry. I do not want him too sedate. If he will agree to use BiPAP if will help. Will adjust his oral pain medications and decreased his IV morphine. Unfortunately cannot use Toradol because of his renal insufficiency.    PLAN     1. Adjust oral pain medications  2. Continue to follow renal function closely. If recovers could then add in a NSTEMI   3. Mobilization, pulmonary toilet  4. Watch QT interval  5. Encourage him to use BiPAP twice a day and at night if he is requiring more than 4 L of nasal oxygen    Plan of care and goals reviewed with mulitdisciplinary team at daily rounds.    I discussed the patient's findings and my recommendations with patient and nursing staff    Time spent Critical care 25 min (It does not include procedure time).    Reji Villar MD  Intensive Care Medicine  06/06/18 4:54 PM

## 2018-06-06 NOTE — PLAN OF CARE
Problem: Patient Care Overview  Goal: Plan of Care Review  Outcome: Ongoing (interventions implemented as appropriate)   06/06/18 1610   Coping/Psychosocial   Plan of Care Reviewed With patient   Plan of Care Review   Progress improving   OTHER   Outcome Summary Pt VSS, Levo gtts restarted. Pulmonary toiliting. encouraged activity as tolerated.        Problem: Fall Risk (Adult)  Goal: Identify Related Risk Factors and Signs and Symptoms  Outcome: Ongoing (interventions implemented as appropriate)   06/06/18 1610   Fall Risk (Adult)   Related Risk Factors (Fall Risk) gait/mobility problems;environment unfamiliar   Signs and Symptoms (Fall Risk) presence of risk factors       Problem: Skin Injury Risk (Adult)  Goal: Identify Related Risk Factors and Signs and Symptoms  Outcome: Ongoing (interventions implemented as appropriate)   06/06/18 1610   Skin Injury Risk (Adult)   Related Risk Factors (Skin Injury Risk) critical care admission;mobility impaired;tissue perfusion altered;edema;body weight extremes     Goal: Skin Health and Integrity  Outcome: Ongoing (interventions implemented as appropriate)   06/06/18 1610   Skin Injury Risk (Adult)   Skin Health and Integrity making progress toward outcome       Problem: Cardiac Surgery (Adult)  Goal: Signs and Symptoms of Listed Potential Problems Will be Absent, Minimized or Managed (Cardiac Surgery)  Outcome: Ongoing (interventions implemented as appropriate)   06/06/18 1610   Goal/Outcome Evaluation   Problems Assessed (Cardiac Surgery) all   Problems Present (Cardiac Surgery) pain;hemodynamic instability

## 2018-06-06 NOTE — PLAN OF CARE
Problem: Patient Care Overview  Goal: Plan of Care Review  Outcome: Ongoing (interventions implemented as appropriate)   06/06/18 1138   Coping/Psychosocial   Plan of Care Reviewed With patient   Plan of Care Review   Progress improving   OTHER   Outcome Summary Pt performed STS x3 with maxA x2. Pt able to achieve full upright posture on last attempt. Pt c/o increased chest pain during mobility. Required encouragement to participate. Continue to progress as appropriate.

## 2018-06-06 NOTE — PROGRESS NOTES
"Paulie Jordan  [id number]  1980     LOS: 8 days   Patient Care Team:  Cesar Neri MD as PCP - General  Cesar Neri MD as PCP - Family Medicine  Cesar Neri MD as PCP - Claims Attributed  Ernestine Barajas RN as Care Coordinator (Population Health)    Chief Complaint: Coronary artery disease      Subjective: Incisional pain    Objective:     Vital Sign Min/Max for last 24 hours  Temp  Min: 98 °F (36.7 °C)  Max: 99.9 °F (37.7 °C)   BP  Min: 93/64  Max: 130/80   Pulse  Min: 68  Max: 77   Resp  Min: 18  Max: 24   SpO2  Min: 92 %  Max: 99 %   No Data Recorded   No Data Recorded     Flowsheet Rows      First Filed Value   Admission Height  182.9 cm (72\") Documented at 05/29/2018 0100   Admission Weight  129 kg (284 lb) Documented at 05/29/2018 0100          Physical Exam:    Wound:Satisfactory    Pulses:     Mediastinal and Chest Tube Drainage:       Results Review:     Results from last 7 days  Lab Units 06/06/18  0350   WBC 10*3/mm3 10.45   HEMOGLOBIN g/dL 8.2*   HEMATOCRIT % 26.1*   PLATELETS 10*3/mm3 138*       Results from last 7 days  Lab Units 06/06/18  0350   SODIUM mmol/L 137   POTASSIUM mmol/L 4.4   CHLORIDE mmol/L 106   CO2 mmol/L 27.0   BUN mg/dL 24*   CREATININE mg/dL 1.69*   GLUCOSE mg/dL 116*   CALCIUM mg/dL 8.4*       Results from last 7 days  Lab Units 06/04/18 2007   PH, ARTERIAL pH units 7.343*   PO2 ART mm Hg 93.3   PCO2, ARTERIAL mm Hg 44.2   HCO3 ART mmol/L 24.0         Assessment    Principal Problem:    CAD with previous history of stents. Most recent stent 3/16/18  Active Problems:    Hypertension    Dyslipidemia    History of leukemia    Neuropathy due to chemotherapeutic drug    Tobacco abuse    Obesity    NSTEMI 5/29/18 s/p thrombectomy and PTCA    KENNETH (acute kidney injury)    Wheelchair bound    S/P CABG x 2 on 6/4/18      satisfactory, DC mediastinal tubes        Ramesh Leggett MD  06/06/18  6:36 AM      Please note that portions of this note were completed with a " voice recognition program. Efforts were made to edit the dictations, but words may be mistranscribed

## 2018-06-06 NOTE — THERAPY TREATMENT NOTE
Acute Care - Physical Therapy Treatment Note  [facility]     Patient Name: Paulie Jordan  : 1980  MRN: [id number]  Today's Date: 2018  Onset of Illness/Injury or Date of Surgery: 18  Date of Referral to PT: 18  Referring Physician: ROBBIN Escalante    Admit Date: 2018    Visit Dx:    ICD-10-CM ICD-9-CM   1. Coronary artery disease involving native coronary artery of native heart with angina pectoris I25.119 414.01     413.9   2. NSTEMI (non-ST elevated myocardial infarction) I21.4 410.70   3. Impaired functional mobility, balance, gait, and endurance Z74.09 V49.89     Patient Active Problem List   Diagnosis   • Hypertension   • CAD with previous history of stents. Most recent stent 3/16/18   • Dyslipidemia   • History of leukemia   • Neuropathy due to chemotherapeutic drug   • Tobacco abuse   • Obesity   • NSTEMI 18 s/p thrombectomy and PTCA   • KENNETH (acute kidney injury)   • Wheelchair bound   • S/P CABG x 2 on 18       Therapy Treatment          Rehabilitation Treatment Summary     Row Name 18 0925             Treatment Time/Intention    Discipline physical therapist  -KR      Document Type therapy note (daily note)  -KR      Subjective Information complains of;weakness  -KR      Mode of Treatment physical therapy  -KR      Care Plan Review risks/benefits reviewed;patient/other agree to care plan  -KR      Therapy Frequency (PT Clinical Impression) daily  -KR      Patient Effort adequate  -KR      Existing Precautions/Restrictions cardiac;fall;oxygen therapy device and L/min;sternal  -KR      Recorded by [KR] Yennifer Patel, PT 18 1138      Row Name 18 0925             Vital Signs    Pre Systolic BP Rehab 97  -KR      Pre Treatment Diastolic BP 61  -KR      Pretreatment Heart Rate (beats/min) 75  -KR      Posttreatment Heart Rate (beats/min) 77  -KR      Pre SpO2 (%) 96  -KR      O2 Delivery Pre Treatment supplemental O2  -KR      Post SpO2 (%) 93  -KR      O2  Delivery Post Treatment supplemental O2  -KR      Pre Patient Position Sitting  -KR      Intra Patient Position Standing  -KR      Post Patient Position Sitting  -KR      Recorded by [KR] Yennifer Patel, PT 06/06/18 1138      Row Name 06/06/18 0925             Cognitive Assessment/Intervention    Additional Documentation Cognitive Assessment/Intervention (Group)  -KR      Recorded by [KR] Yennifer Patel, PT 06/06/18 1138      Row Name 06/06/18 0925             Cognitive Assessment/Intervention- PT/OT    Affect/Mental Status (Cognitive) flat/blunted affect  -KR      Orientation Status (Cognition) oriented x 4  -KR      Follows Commands (Cognition) follows one step commands;over 90% accuracy;repetition of directions required;verbal cues/prompting required  -KR      Cognitive Function (Cognitive) safety deficit  -KR      Safety Deficit (Cognitive) moderate deficit;ability to follow commands;awareness of need for assistance;insight into deficits/self awareness;safety precautions awareness  -KR      Personal Safety Interventions fall prevention program maintained;gait belt;nonskid shoes/slippers when out of bed  -KR      Recorded by [KR] Yennifer Patel, PT 06/06/18 1138      Row Name 06/06/18 0925             Safety Issues, Functional Mobility    Safety Issues Affecting Function (Mobility) ability to follow commands;awareness of need for assistance;insight into deficits/self awareness;safety precaution awareness;safety precautions follow-through/compliance  -KR      Impairments Affecting Function (Mobility) balance;endurance/activity tolerance;pain;strength  -KR      Recorded by [KR] Yennifer Patel, PT 06/06/18 1138      Row Name 06/06/18 0925             Bed Mobility Assessment/Treatment    Comment (Bed Mobility) UIC  -KR      Recorded by [KR] Yennifer Patel, PT 06/06/18 1138      Row Name 06/06/18 0925             Transfer Assessment/Treatment    Transfer Assessment/Treatment sit-stand transfer;stand-sit transfer  -KR       Comment (Transfers) STS x3 from chair with PT/tech in front on either side. Pt required increased cueing for sequencing and hand placement to maintain sternal precautions. On third attempt, pt able to achieve full upright posture.   -KR      Sit-Stand [place] (Transfers) maximum assist (25% patient effort);2 person assist;verbal cues  -KR      Stand-Sit [place] (Transfers) maximum assist (25% patient effort);2 person assist;verbal cues  -KR      Recorded by [KR] Yennifer Patel, PT 06/06/18 1138      Row Name 06/06/18 0925             Sit-Stand Transfer    Assistive Device (Sit-Stand Transfers) other (see comments)   BUE support  -KR      Recorded by [KR] Yennifer Patel, PT 06/06/18 1138      Row Name 06/06/18 0925             Stand-Sit Transfer    Assistive Device (Stand-Sit Transfers) other (see comments)   BUE support  -KR      Recorded by [KR] Yennifer Patel, PT 06/06/18 1138      Row Name 06/06/18 0925             Gait/Stairs Assessment/Training    [place] Level (Gait) unable to assess  -KR      Comment (Gait/Stairs) Pt non-ambulatory at baseline.   -KR      Recorded by [KR] Yennifer Patel, PT 06/06/18 1138      Row Name 06/06/18 0925             Motor Skills Assessment/Interventions    Additional Documentation Balance (Group);Therapeutic Exercise (Group)  -KR      Recorded by [KR] Yennifer Patel, PT 06/06/18 1138      Row Name 06/06/18 0925             Therapeutic Exercise    Therapeutic Exercise seated, lower extremities  -KR      Additional Documentation Therapeutic Exercise (Row)  -KR      Recorded by [KR] Yennifer Patel, PT 06/06/18 1138      Row Name 06/06/18 0925             Lower Extremity Seated Therapeutic Exercise    Performed, Seated Lower Extremity (Therapeutic Exercise) hip flexion/extension;ankle dorsiflexion/plantarflexion;LAQ (long arc quad), knee extension  -KR      Exercise Type, Seated Lower Extremity (Therapeutic Exercise) AROM (active range of motion)  -KR      Sets/Reps  Detail, Seated Lower Extremity (Therapeutic Exercise) BLE 10x each  -KR      Recorded by [RODERICK] Yennifer Patel, PT 06/06/18 1138      Row Name 06/06/18 0925             Balance    Balance static sitting balance;static standing balance  -KR      Recorded by [KR] Yennifer Patel, PT 06/06/18 1138      Row Name 06/06/18 0925             Static Sitting Balance    Level of [place] (Unsupported Sitting, Static Balance) contact guard assist  -KR      Sitting Position (Unsupported Sitting, Static Balance) sitting in chair  -KR      Recorded by [RODERICK] Yennifer Patel, PT 06/06/18 1138      Row Name 06/06/18 0925             Static Standing Balance    Level of [place] (Supported Standing, Static Balance) maximal assist, 25 to 49% patient effort   maxA x2  -KR      Assistive Device Utilized (Supported Standing, Static Balance) other (see comments)   BUE support  -KR      Recorded by [RODERICK] Yennifer Patel, PT 06/06/18 1138      Row Name 06/06/18 0925             Positioning and Restraints    Pre-Treatment Position sitting in chair/recliner  -KR      Post Treatment Position chair  -KR      In Chair reclined;call light within reach;encouraged to call for assist;with nsg;RUE elevated;LUE elevated;on mechanical lift sling;legs elevated  -KR      Recorded by [RODERICK] Yennifer Patel, PT 06/06/18 1138      Row Name 06/06/18 0925             Pain Assessment    Additional Documentation Pain Scale: Numbers Pre/Post-Treatment (Group)  -KR      Recorded by [RODERICK] Yennifer Patel, PT 06/06/18 1138      Row Name 06/06/18 0925             Pain Scale: Numbers Pre/Post-Treatment    Pain Scale: Numbers, Pretreatment 8/10  -KR      Pain Scale: Numbers, Post-Treatment 8/10  -KR      Pain Location - Orientation incisional  -KR      Pain Location chest  -KR      Pain Intervention(s) Repositioned;Ambulation/increased activity  -KR      Recorded by [RODERICK] Yennifer Patel, PT 06/06/18 1138      Row Name                Wound 06/04/18 0867 chest incision     Wound - Properties Group Date first assessed: 06/04/18 [SH] Time first assessed: 0826 [SH] Location: chest [SH] Type: incision [SH] Recorded by:  [SH] Sherri L Haase, RN 06/04/18 0826    Row Name                Wound 06/04/18 0917 Right leg incision    Wound - Properties Group Date first assessed: 06/04/18 [SH] Time first assessed: 0917 [SH] Side: Right [SH] Location: leg [SH] Type: incision [SH] Recorded by:  [SH] Sherri L Haase, RN 06/04/18 0917    Row Name                Wound 06/04/18 1454 Left surgical    Wound - Properties Group Date first assessed: 06/04/18 [JOHNNY] Time first assessed: 1454 [JOHNNY] Side: Left [JOHNNY] Type: surgical [JOHNNY] Recorded by:  [JOHNNY] Rajiv Bryson RN 06/04/18 1454    Row Name 06/06/18 0925             Plan of Care Review    Plan of Care Reviewed With patient  -KR      Recorded by [KR] Yennifer Patel, PT 06/06/18 1138      Row Name 06/06/18 0925             Outcome Summary/Treatment Plan (PT)    Daily Summary of Progress (PT) progress toward functional goals is gradual  -KR      Recorded by [KR] Yennifer Patel, PT 06/06/18 1138        User Key  (r) = Recorded By, (t) = Taken By, (c) = Cosigned By    Initials Name Effective Dates Discipline     Sherri L Haase, RN 06/16/16 -  Nurse    JOHNNY Bryson RN 06/16/16 -  Nurse    RODERICK Patel, PT 04/03/18 -  PT          Wound 06/04/18 0826 chest incision (Active)   Dressing Appearance dry;intact 6/6/2018  6:00 AM   Closure Staples;Liquid skin adhesive 6/5/2018  8:00 PM   Periwound intact;dry 6/6/2018  6:00 AM   Periwound Temperature warm 6/6/2018  6:00 AM   Drainage Amount none 6/6/2018  6:00 AM   Care, Wound cleansed with;antimicrobial agent applied 6/5/2018  8:00 PM   Dressing Care, Wound dressing removed 6/5/2018  8:00 PM       Wound 06/04/18 0917 Right leg incision (Active)   Dressing Appearance dry;intact 6/6/2018  6:00 AM   Closure Staples 6/6/2018  6:00 AM   Periwound dry;intact 6/6/2018  6:00 AM   Drainage Amount scant 6/5/2018   8:00 PM   Care, Wound cleansed with;antimicrobial agent applied 6/5/2018  8:00 PM   Dressing Care, Wound gauze, dry 6/5/2018  8:00 PM       Wound 06/04/18 1454 Left surgical (Active)   Dressing Appearance dry;intact 6/6/2018  6:00 AM   Closure Staples 6/6/2018  6:00 AM   Drainage Amount none 6/6/2018  6:00 AM   Care, Wound cleansed with;antimicrobial agent applied 6/5/2018  8:00 PM   Dressing Care, Wound gauze, dry 6/5/2018  8:00 PM             Physical Therapy Education     Title: PT OT SLP Therapies (Active)     Topic: Physical Therapy (Active)     Point: Mobility training (Active)    Learning Progress Summary     Learner Status Readiness Method Response Comment Documented by    Patient Active Acceptance E NR  KR 06/06/18 1138     Active Acceptance E,D NR  JOHNNY 06/05/18 1013    Father Active Acceptance E,D NR  JOHNNY 06/05/18 1013          Point: Home exercise program (Active)    Learning Progress Summary     Learner Status Readiness Method Response Comment Documented by    Patient Active Acceptance E NR  KR 06/06/18 1138     Active Acceptance E,D NR  JOHNNY 06/05/18 1013    Father Active Acceptance E,D NR  JOHNNY 06/05/18 1013          Point: Body mechanics (Active)    Learning Progress Summary     Learner Status Readiness Method Response Comment Documented by    Patient Active Acceptance E NR  KR 06/06/18 1138     Active Acceptance E,D NR  JOHNNY 06/05/18 1013    Father Active Acceptance E,D NR  JOHNNY 06/05/18 1013          Point: Precautions (Active)    Learning Progress Summary     Learner Status Readiness Method Response Comment Documented by    Patient Active Acceptance E NR  KR 06/06/18 1138     Active Acceptance E,D NR  JOHNNY 06/05/18 1013    Father Active Acceptance E,D NR  JOHNNY 06/05/18 1013                      User Key     Initials Effective Dates Name Provider Type Discipline    KR 04/03/18 -  Yennifer Patel, PT Physical Therapist PT    JOHNNY 05/15/18 -  Ki Workman, PT Student PT Student PT                    PT Recommendation  and Plan  Therapy Frequency (PT Clinical Impression): daily  Outcome Summary/Treatment Plan (PT)  Daily Summary of Progress (PT): progress toward functional goals is gradual  Plan of Care Reviewed With: patient  Progress: improving  Outcome Summary: Pt performed STS x3 with maxA x2. Pt able to achieve full upright posture on last attempt. Pt c/o increased chest pain during mobility. Required encouragement to participate. Continue to progress as appropriate.           Outcome Measures     Row Name 06/06/18 0925 06/05/18 1000          How much help from another person do you currently need...    Turning from your back to your side while in flat bed without using bedrails? 2  -KR 1  -JOHNNY (r) JBA (t) JOHNNY (c)     Moving from lying on back to sitting on the side of a flat bed without bedrails? 1  -KR 1  -JOHNNY (r) JBA (t) JOHNNY (c)     Moving to and from a bed to a chair (including a wheelchair)? 1  -KR 1  -JOHNNY (r) JBA (t) JOHNNY (c)     Standing up from a chair using your arms (e.g., wheelchair, bedside chair)? 2  -KR 2  -JOHNNY (r) JBA (t) JOHNNY (c)     Climbing 3-5 steps with a railing? 1  -KR 1  -JOHNNY (r) JBA (t) JOHNNY (c)     To walk in hospital room? 1  -KR 1  -JOHNNY (r) JBA (t) JOHNNY (c)     AM-PAC 6 Clicks Score 8  -KR 7  -JOHNNY (r) JBA (t)        Functional Assessment    Outcome Measure Options AM-PAC 6 Clicks Basic Mobility (PT)  -KR AM-PAC 6 Clicks Basic Mobility (PT)  -JOHNNY (r) JBA (t) JOHNNY (c)       User Key  (r) = Recorded By, (t) = Taken By, (c) = Cosigned By    Initials Name Provider Type    JOHNNY Saha, PT Physical Therapist    RODERICK Patel, PT Physical Therapist    SULY Workman, PT Student PT Student           Time Calculation:         PT Charges     Row Name 06/06/18 1142             Time Calculation    Start Time 0925  -KR      PT Received On 06/06/18  -KR      PT Goal Re-Cert Due Date 06/15/18  -KR         Time Calculation- PT    Total Timed Code Minutes- PT 24 minute(s)  -KR        User Key  (r) = Recorded By, (t) =  Taken By, (c) = Cosigned By    Initials Name Provider Type    RODERICK Patel, PT Physical Therapist          Therapy Charges for Today     Code Description Service Date Service Provider Modifiers Qty    45363828497 HC PT THER PROC EA 15 MIN 6/6/2018 Yennifer Patel, PT GP 2    27618638486 HC PT THER SUPP EA 15 MIN 6/6/2018 Yennifer Patel, PT GP 2          PT G-Codes  Outcome Measure Options: AM-PAC 6 Clicks Basic Mobility (PT)    Garima Patel PT  6/6/2018

## 2018-06-06 NOTE — PROGRESS NOTES
"[facility] at [facility]  IP Progress Note   LOS: 8 days   Patient Care Team:  Cesar Neri MD as PCP - General  Cesar Neri MD as PCP - Family Medicine  Cesar Neri MD as PCP - Claims Attributed  Ernestine aBrajas, RN as Care Coordinator (Population Health)    Chief Complaint: Follow up for Coronary Artery Disease/acute MI    Subjective    Sitting up in chair, pain better but states that it still hurts to breathe and move. Unable to ambulate due to chronic disability.  Mediastinal tubes removed.     Tele: Sinus Rythym    Vitals:  Blood pressure 93/57, pulse 74, temperature 98.9 °F (37.2 °C), temperature source Oral, resp. rate 20, height 182.9 cm (72.01\"), weight 135 kg (297 lb 9.9 oz), SpO2 94 %.     Intake/Output Summary (Last 24 hours) at 06/06/18 1254  Last data filed at 06/06/18 0600   Gross per 24 hour   Intake            690.9 ml   Output             1150 ml   Net           -459.1 ml   Physical Exam:    General: alert, no acute distress, acyanotic, well developed, well nourished   Chest: Clear Auscultation and No Wheezes   CV: Heart sounds are normal.  Regular rate and rhythm without murmur, gallop or rub.   Extremities: Trace edema  Results Review:     I reviewed the patient's new clinical results.      Results from last 7 days  Lab Units 06/06/18  0350   WBC 10*3/mm3 10.45   HEMOGLOBIN g/dL 8.2*   HEMATOCRIT % 26.1*   PLATELETS 10*3/mm3 138*       Results from last 7 days  Lab Units 06/06/18  0350  06/04/18  0511   SODIUM mmol/L 137  < > 135   POTASSIUM mmol/L 4.4  < > 5.1   CHLORIDE mmol/L 106  < > 105   CO2 mmol/L 27.0  < > 22.0   BUN mg/dL 24*  < > 22   CREATININE mg/dL 1.69*  < > 1.40*   CALCIUM mg/dL 8.4*  < > 8.3*   BILIRUBIN mg/dL  --   --  0.4   ALK PHOS U/L  --   --  120*   ALT (SGPT) U/L  --   --  37   AST (SGOT) U/L  --   --  35*   GLUCOSE mg/dL 116*  < > 98   < > = values in this interval not displayed.    Scheduled Meds:  aspirin 325 mg Oral Daily "   atorvastatin 80 mg Oral Nightly   buPROPion  mg Oral Daily   citalopram 40 mg Oral Daily   febuxostat 40 mg Oral Daily   insulin lispro 0-9 Units Subcutaneous 4x Daily With Meals & Nightly   levothyroxine 50 mcg Oral Q AM   metoprolol tartrate 12.5 mg Oral Q12H   Morphine 30 mg Oral Q12H   pantoprazole 40 mg Oral Q AM   pharmacy consult - MTM  Does not apply Daily   polyethylene glycol 17 g Oral Daily   sennosides-docusate sodium 2 tablet Oral BID       Continuous Infusions:   Assessment and Plan:   1. CAD with recurrent NSTEMI  - Echo EF 40%  - s/p 2 vessel CABG today 6/4/18  per Dr. Leggett   - hemodynamically stable, currently NSR, Off vasopressors.  - continue routine post-op care   2. HLD  3. Tobacco abuse, Cessation discussed.  4. KENNETH, worsening, continue to monitor.  5. Chronic pain    ROBBIN Talavera  06/06/18  8:36 AM    I have seen and examined the patient, case was discussed with the physician extender, reviewed the above note, necessary changes were made and I agree with the final note.   Devorah Hightower MD, FACC, [facility]

## 2018-06-06 NOTE — PLAN OF CARE
Problem: Patient Care Overview  Goal: Plan of Care Review  Outcome: Ongoing (interventions implemented as appropriate)   06/06/18 0240   Coping/Psychosocial   Plan of Care Reviewed With patient   Plan of Care Review   Progress improving   OTHER   Outcome Summary Pt vitals stable, up with lift, Levo weaned off per protocol, and pt. tolerating, pt pain managed, will continue to monitor.        Problem: Cardiac: ACS (Acute Coronary Syndrome) (Adult)  Goal: Signs and Symptoms of Listed Potential Problems Will be Absent, Minimized or Managed (Cardiac: ACS)  Outcome: Ongoing (interventions implemented as appropriate)   06/05/18 1720   Goal/Outcome Evaluation   Problems Assessed (Acute Coronary Syndrome) all   Problems Present (Acute Coronary Syn) situational response       Problem: Fall Risk (Adult)  Goal: Absence of Fall  Outcome: Ongoing (interventions implemented as appropriate)   06/06/18 0240   Fall Risk (Adult)   Absence of Fall making progress toward outcome       Problem: Skin Injury Risk (Adult)  Goal: Skin Health and Integrity  Outcome: Ongoing (interventions implemented as appropriate)   06/06/18 0240   Skin Injury Risk (Adult)   Skin Health and Integrity making progress toward outcome       Problem: Cardiac Surgery (Adult)  Goal: Signs and Symptoms of Listed Potential Problems Will be Absent, Minimized or Managed (Cardiac Surgery)  Outcome: Ongoing (interventions implemented as appropriate)   06/05/18 1720   Goal/Outcome Evaluation   Problems Assessed (Cardiac Surgery) all   Problems Present (Cardiac Surgery) situational response;pain;bowel motility decreased

## 2018-06-06 NOTE — PROGRESS NOTES
Continued Stay Note  [facility]     Patient Name: Paulie Jordan  MRN: [id number]  Today's Date: 6/6/2018    Admit Date: 5/29/2018          Discharge Plan     Row Name 06/06/18 1150       Plan    Plan Home     Patient/Family in Agreement with Plan yes    Plan Comments Visited with patient at bedside. Patient still plans on home at discharge and his parents will help out as needed. Parents live next door.  Patient has a shower bench and wheelchair x2 that he uses in the home. CM to follow for discharge needs. Christine @ [phone]               Discharge Codes    No documentation.       Expected Discharge Date and Time     Expected Discharge Date Expected Discharge Time    Jun 5, 2018             Allison Garcia RN

## 2018-06-06 NOTE — PROGRESS NOTES
Multidisciplinary Rounds    Time: 20min  Patient Name: Paulie Jordan  Date of Encounter: 06/06/18 11:54 AM  MRN: [id number]  Admission date: 5/29/2018      Reason for visit: MDR. RD to continue to follow per protocol.     Additional information obtained during MDR:  Diet advanced this morning.  Not much PO intake.    Patient c/o of poor appetite.  Refused nutr'l supplement.     Current diet: Diet Regular; Cardiac    EMR reviewed     Intervention:  Review menu options and encourage intake  Follow treatment plan  Care plan reviewed    Follow up:   Per protocol      Felisha Aranda RD  11:54 AM

## 2018-06-07 NOTE — PROGRESS NOTES
Intensivist Note     6/7/2018  Hospital Day: 9  3 Days Post-Op      Mr. Paulie Jordan, 38 y.o. male is followed for:  Principal Problem:    CAD with previous history of stents. Most recent stent 3/16/18  Active Problems:    NSTEMI 5/29/18 s/p thrombectomy and PTCA    S/P CABG x 2 on 6/4/18    KENNETH (acute kidney injury)    Hypertension    Dyslipidemia    History of leukemia    Neuropathy due to chemotherapeutic drug    Wheelchair bound    Tobacco abuse    Obesity       SUBJECTIVE     37-year-old debilitated wheelchair bound white male with a history of CAD, hypertension, hyperlipidemia, IBS, hypothyroidism on replacement, and leukemia initially diagnosed in 1998 with recurrence 2000. He is status post chemotherapy and radiation then bone marrow transplant 2000. This left him with a significant neuropathy and inability to ambulate.     He suffered a NSTEMI requiring cardiac catheterization and LAD stent placement 8/2016. Had a recurrent  NSTEMI 9/2017 for NSTEMI, but catheterization revealed only nonobstructive disease and a patent LAD stent. He then suffered a recurrent NSTEMI 2/2018 which was treated medically. His last NSTEMI occurred 3/2018 and repeat catheterization was performed. This suggested a culprit lesion for which he underwent PTCA and stent to the Ramus intermedius artery. Did well until 5/25/18 when he developed severe chest pain which responded to nitroglycerin.Pain recurred 5/28/18 and he presented to [facility] ER where he was again diagnosed with NSTEMI. Was transferred to [facility] 5/29/18 and underwent catheterization revealing occluded ostial LAD for which he underwent thrombectomy and angioplasty. His previous stent in the ostial ramus intermedius artery apparently remained patent. Because of residual disease 6/4/18 he underwent uneventful two-vessel CABG and was able to be weaned and extubated same day. Note that preoperative LVEF was 40% and postop cardiac index was 2.0.      Patient came off  "all pressors 6/5/18.  MT tubes were removed 6/6/18 but continues to complain of significant pain probably related to the fact that he is on significant amounts of chronic narcotics at home and is less responsive here.  have tried to avoid using excess narcotics because he has a tendency to underventilate and has atelectasis with associated increased FiO2 requirement. Unfortunately with his acute renal injury (while it is improving), cannot use NSAID's. Pain has led to splinting and subsequent under ventilation with associated atelectasis and higher FiO2 requirement. Today I note he was placed on a high flow nasal cannula last night. He is on 60% but his saturations are 98%.     The patient's relevant past medical, surgical and social history were reviewed and updated in Epic as appropriate.    OBJECTIVE     BP 91/53   Pulse 79   Temp 98.1 °F (36.7 °C) (Oral)   Resp 20   Ht 182.9 cm (72.01\")   Wt 135 kg (297 lb 9.9 oz)   SpO2 100%   BMI 40.36 kg/m²   Oxygen Concentration (%): 40 high flow nasal oxygen      Flowsheet Rows      First Filed Value   Admission Height  182.9 cm (72\") Documented at 05/29/2018 0100   Admission Weight  129 kg (284 lb) Documented at 05/29/2018 0100        Intake & Output (last day)       06/06 0701 - 06/07 0700 06/07 0701 - 06/08 0700    P.O. 600     I.V. (mL/kg) 262 (1.9) 11 (0.1)    IV Piggyback      Total Intake(mL/kg) 862 (6.4) 11 (0.1)    Urine (mL/kg/hr) 915 (0.3)     Chest Tube      Total Output 915      Net -53 +11                Exam:  General Exam:  Middle-aged white male. Appears chronically ill but in NAD  HEENT: Pupils equal and reactive. Nose and throat clear.  Neck:                          Supple, no JVD, thyromegaly, or adenopathy. Right IJ line in place  Lungs: Clear but diminished breath sounds in the bases.  Cardiovascular: Regular rate and rhythm without murmurs or gallops.  Abdomen: Soft nontender without organomegaly or masses. Obese   and " rectal: Deferred.  Extremities: No cyanosis or clubbing but 1-2+ lower extremity edema.  Neurologic:                 Symmetric strength. No focal deficits.    Chest X-Ray: No x-ray today      Results from last 7 days  Lab Units 06/07/18  0351 06/06/18  0350 06/05/18  0342   WBC 10*3/mm3 11.18* 10.45 12.83*   HEMOGLOBIN g/dL 7.5* 8.2* 8.7*   HEMATOCRIT % 24.0* 26.1* 27.6*   PLATELETS 10*3/mm3 154 138* 181       Results from last 7 days  Lab Units 06/07/18  0350 06/06/18  0350   SODIUM mmol/L 135 137   POTASSIUM mmol/L 4.2 4.4   CHLORIDE mmol/L 94* 106   CO2 mmol/L 25.0 27.0   BUN mg/dL 27* 24*   CREATININE mg/dL 1.62* 1.69*   GLUCOSE mg/dL 102* 116*   CALCIUM mg/dL 8.3* 8.4*       Results from last 7 days  Lab Units 06/07/18  0350 06/06/18  0350 06/05/18  0342   MAGNESIUM mg/dL 2.4 2.4 2.4   PHOSPHORUS mg/dL 3.1 3.7 3.0       Results from last 7 days  Lab Units 06/04/18  0511   ALK PHOS U/L 120*   BILIRUBIN mg/dL 0.4   ALT (SGPT) U/L 37   AST (SGOT) U/L 35*       No results found for: SEDRATE  Lab Results   Component Value Date    BNP 42.0 05/29/2018     Lab Results   Component Value Date    CKTOTAL 132 03/16/2018    CKMB 11.03 (C) 03/16/2018    CKMBINDEX 8.4 (H) 03/16/2018    TROPONINI 18.261 (C) 05/29/2018     Lab Results   Component Value Date    TSH 4.607 02/03/2018     No results found for: LACTATE  No results found for: CORTISOL      Results from last 7 days  Lab Units 06/04/18 2007 06/04/18  1926 06/04/18  1848 06/04/18  1739 06/04/18  1650   PH, ARTERIAL pH units 7.343* 7.347* 7.331* 7.274* 7.248*   PCO2, ARTERIAL mm Hg 44.2 43.3 45.0 49.1* 51.7*   PO2 ART mm Hg 93.3 102.0 69.0* 68.2* 81.4*   HCO3 ART mmol/L 24.0 23.8 23.7 22.8 22.5   FIO2 % 40 21 40 40 40         I reviewed the patient's results, images and medication.    Assessment/Plan   ASSESSMENT      Principal Problem:    CAD with previous history of stents. Most recent stent 3/16/18  Active Problems:    NSTEMI 5/29/18 s/p thrombectomy and PTCA    S/P  CABG x 2 on 6/4/18    KENNETH (acute kidney injury)    Hypertension    Dyslipidemia    History of leukemia    Neuropathy due to chemotherapeutic drug    Wheelchair bound    Tobacco abuse    Obesity      DISCUSSION: No major problems but very slow progress. This is primarily due to suboptimal inspiration, inability to mobilize, atelectasis, and the need for significant doses of narcotics.    PLAN     1. Increase his usual dose of MS Contin from 30 mg twice daily to 45 mg twice daily  2. Discontinue IV morphine  3. Percocet for breakthrough  4. Albuterol nebs 4 times a day followed by flutter valve  5. Okay to telemetry from my standpoint  6. Sputum culture and sensitivity  7. Follow-up chest x-ray tomorrow    Plan of care and goals reviewed with mulitdisciplinary team at daily rounds.    I discussed the patient's findings and my recommendations with patient and nursing staff    Time spent Critical care 32 min (It does not include procedure time).    Reji Villar MD  Intensive Care Medicine  06/07/18 3:10 PM

## 2018-06-07 NOTE — PLAN OF CARE
Problem: Patient Care Overview  Goal: Plan of Care Review  Outcome: Ongoing (interventions implemented as appropriate)   06/07/18 1129   Coping/Psychosocial   Plan of Care Reviewed With patient   Plan of Care Review   Progress no change   OTHER   Outcome Summary Pt performed STS x3 with maxA x2. Pt able to achieve full upright posture on last attempt. Pt required increased encouragement to participate; limited by c/o increased pain.

## 2018-06-07 NOTE — PLAN OF CARE
Problem: Patient Care Overview  Goal: Plan of Care Review  Outcome: Ongoing (interventions implemented as appropriate)   06/07/18 1646   Coping/Psychosocial   Plan of Care Reviewed With patient   Plan of Care Review   Progress improving   OTHER   Outcome Summary VSS; 2L NC; Pt i chair via sling; pain not well controlled but pain meds increased        Problem: Fall Risk (Adult)  Goal: Identify Related Risk Factors and Signs and Symptoms  Outcome: Ongoing (interventions implemented as appropriate)   06/07/18 1646   Fall Risk (Adult)   Related Risk Factors (Fall Risk) fatigue/slow reaction;gait/mobility problems;polypharmacy;environment unfamiliar   Signs and Symptoms (Fall Risk) presence of risk factors     Goal: Absence of Fall  Outcome: Ongoing (interventions implemented as appropriate)   06/07/18 1646   Fall Risk (Adult)   Absence of Fall making progress toward outcome       Problem: Skin Injury Risk (Adult)  Goal: Identify Related Risk Factors and Signs and Symptoms  Outcome: Ongoing (interventions implemented as appropriate)   06/07/18 1646   Skin Injury Risk (Adult)   Related Risk Factors (Skin Injury Risk) critical care admission;hospitalization prolonged;medication;mobility impaired     Goal: Skin Health and Integrity  Outcome: Ongoing (interventions implemented as appropriate)   06/07/18 1646   Skin Injury Risk (Adult)   Skin Health and Integrity making progress toward outcome       Problem: Cardiac Surgery (Adult)  Goal: Signs and Symptoms of Listed Potential Problems Will be Absent, Minimized or Managed (Cardiac Surgery)  Outcome: Ongoing (interventions implemented as appropriate)   06/07/18 1646   Goal/Outcome Evaluation   Problems Assessed (Cardiac Surgery) all   Problems Present (Cardiac Surgery) functional deficit;pain;situational response

## 2018-06-07 NOTE — NURSING NOTE
Order received for Phase II Cardiac Rehab. Staff consulted with patient regarding Phase II Cardiac Rehab prior to his surgery and he declined participation in the program.  Staff available if further consultation needed.

## 2018-06-07 NOTE — SIGNIFICANT NOTE
"Called by RN as patient requested to speak with APRN/MD regarding pain medications. Mr. Jordan states that he is in constant pain which he describes as 'the worst of my life\". He is frustrated and upset that he doesn't have complete pain control. He is currently on O2 @ 5L/NC and sats are 95%, he is taking shallowbreaths. His CXR shows significant atelectasis of the right lung which is consistent over the past couple of days. Dr. Villar restarted the home dose of MS Contin today and he is receiving it every 12 hours. In addition the home dose of Valium was re-started and he is receiving Percocet and Morphine PRN.     I discussed with the patient that his CXR shows that he is not taking deep breaths and coughing and he needs to be awake to do both of those or he is at risk for pneumonia and possibly re-intubation. He was prescribed CPAP but is intolerant. I discussed that he must be able to oxygenate appropriately and that pain meds will blunt his ability to breathe deeply. He is willing to try a high flow cannula and if he is able to tolerate he may be able to have further pain medications. He voices understanding of the reasoning and does not want to go back on the ventilator so he will try to cough and deep breathe.     KRISTOPHER Goddard, [facility]  Pulmonary & Critical Care    "

## 2018-06-07 NOTE — THERAPY TREATMENT NOTE
Acute Care - Physical Therapy Treatment Note  [facility]     Patient Name: Paulie Jordan  : 1980  MRN: [id number]  Today's Date: 2018  Onset of Illness/Injury or Date of Surgery: 18  Date of Referral to PT: 18  Referring Physician: ROBBIN Escalante    Admit Date: 2018    Visit Dx:    ICD-10-CM ICD-9-CM   1. Coronary artery disease involving native coronary artery of native heart with angina pectoris I25.119 414.01     413.9   2. NSTEMI (non-ST elevated myocardial infarction) I21.4 410.70   3. Impaired functional mobility, balance, gait, and endurance Z74.09 V49.89     Patient Active Problem List   Diagnosis   • Hypertension   • CAD with previous history of stents. Most recent stent 3/16/18   • Dyslipidemia   • History of leukemia   • Neuropathy due to chemotherapeutic drug   • Tobacco abuse   • Obesity   • NSTEMI 18 s/p thrombectomy and PTCA   • KENNETH (acute kidney injury)   • Wheelchair bound   • S/P CABG x 2 on 18       Therapy Treatment          Rehabilitation Treatment Summary     Row Name 18 1055             Treatment Time/Intention    Discipline physical therapist  -KR      Document Type therapy note (daily note)  -KR      Subjective Information complains of;pain  -KR      Mode of Treatment physical therapy  -KR      Care Plan Review risks/benefits reviewed;patient/other agree to care plan  -KR      Therapy Frequency (PT Clinical Impression) daily  -KR      Patient Effort adequate  -KR      Existing Precautions/Restrictions cardiac;fall;oxygen therapy device and L/min;sternal  -KR      Recorded by [KR] Yennifer Patel, PT 18 1128      Row Name 18 1055             Vital Signs    Pre Systolic BP Rehab 104  -KR      Pre Treatment Diastolic BP 61  -KR      Post Systolic BP Rehab 101  -KR      Post Treatment Diastolic BP 68  -KR      Pretreatment Heart Rate (beats/min) 84  -KR      Posttreatment Heart Rate (beats/min) 90  -KR      Pre SpO2 (%) 98  -KR      O2  Delivery Pre Treatment supplemental O2  -KR      Post SpO2 (%) 96  -KR      O2 Delivery Post Treatment supplemental O2  -KR      Pre Patient Position Sitting  -KR      Intra Patient Position Standing  -KR      Post Patient Position Sitting  -KR      Recorded by [KR] Yennifer Patel, PT 06/07/18 1128      Row Name 06/07/18 1055             Cognitive Assessment/Intervention    Additional Documentation Cognitive Assessment/Intervention (Group)  -KR      Recorded by [KR] Yennifer Patel, PT 06/07/18 1128      Row Name 06/07/18 1055             Cognitive Assessment/Intervention- PT/OT    Affect/Mental Status (Cognitive) flat/blunted affect  -KR      Orientation Status (Cognition) oriented x 4  -KR      Follows Commands (Cognition) follows one step commands;over 90% accuracy;repetition of directions required;verbal cues/prompting required  -KR      Cognitive Function (Cognitive) safety deficit  -KR      Safety Deficit (Cognitive) mild deficit;ability to follow commands;awareness of need for assistance;insight into deficits/self awareness;safety precautions awareness;safety precautions follow-through/compliance  -KR      Personal Safety Interventions fall prevention program maintained;gait belt;nonskid shoes/slippers when out of bed  -KR      Recorded by [KR] Yennifer Patel, PT 06/07/18 1128      Row Name 06/07/18 1055             Safety Issues, Functional Mobility    Safety Issues Affecting Function (Mobility) ability to follow commands;awareness of need for assistance;insight into deficits/self awareness;safety precaution awareness;safety precautions follow-through/compliance  -KR      Impairments Affecting Function (Mobility) balance;endurance/activity tolerance;strength;pain  -KR      Recorded by [KR] Yennifer Patel, PT 06/07/18 1128      Row Name 06/07/18 1055             Bed Mobility Assessment/Treatment    Comment (Bed Mobility) UIC  -KR      Recorded by [KR] Yennifer Patel, PT 06/07/18 1128      Row Name 06/07/18  1055             Transfer Assessment/Treatment    Transfer Assessment/Treatment sit-stand transfer;stand-sit transfer  -KR      Comment (Transfers) STS x3. Pt unable to clear hips during first 2 attempts, on third attempt pt able to achieve upright posture. PT/tech in front on either side to block leia knees. Pt required increased encouragement and motivation to participate.   -KR      Sit-Stand [place] (Transfers) maximum assist (25% patient effort);2 person assist;verbal cues  -KR      Stand-Sit [place] (Transfers) maximum assist (25% patient effort);2 person assist;verbal cues  -KR      Recorded by [KR] Yennifer Patel, PT 06/07/18 1128      Row Name 06/07/18 1055             Sit-Stand Transfer    Assistive Device (Sit-Stand Transfers) other (see comments)   BUE support  -KR      Recorded by [KR] Yennifer Patel, PT 06/07/18 1128      Row Name 06/07/18 1055             Stand-Sit Transfer    Assistive Device (Stand-Sit Transfers) other (see comments)   BUE support  -KR      Recorded by [KR] Yennifer Patel, PT 06/07/18 1128      Row Name 06/07/18 1055             Gait/Stairs Assessment/Training    [place] Level (Gait) unable to assess  -KR      Comment (Gait/Stairs) Not appropriate to assess. Pt non-ambulatory at baseline.   -KR      Recorded by [KR] Yennifer Patel, PT 06/07/18 1128      Row Name 06/07/18 1055             Motor Skills Assessment/Interventions    Additional Documentation Balance (Group);Therapeutic Exercise (Group)  -KR      Recorded by [KR] Yennifer Patel, PT 06/07/18 1128      Row Name 06/07/18 1055             Therapeutic Exercise    Therapeutic Exercise seated, lower extremities  -KR      Additional Documentation Therapeutic Exercise (Row)  -KR      Recorded by [KR] Yennifer Patel, PT 06/07/18 1128      Row Name 06/07/18 1055             Lower Extremity Seated Therapeutic Exercise    Performed, Seated Lower Extremity (Therapeutic Exercise) hip flexion/extension;ankle  dorsiflexion/plantarflexion;LAQ (long arc quad), knee extension  -KR      Exercise Type, Seated Lower Extremity (Therapeutic Exercise) AROM (active range of motion)  -KR      Sets/Reps Detail, Seated Lower Extremity (Therapeutic Exercise) BLE 10x each  -KR      Recorded by [RODERICK] Yennifer Patel, PT 06/07/18 1128      Row Name 06/07/18 1055             Balance    Balance static sitting balance;static standing balance  -KR      Recorded by [RODERICK] Yennifer Patel, PT 06/07/18 1128      Row Name 06/07/18 1055             Static Sitting Balance    Level of [place] (Unsupported Sitting, Static Balance) moderate assist, 50 to 74% patient effort  -KR      Sitting Position (Unsupported Sitting, Static Balance) sitting in chair  -KR      Recorded by [RODERICK] Yennifer Patel, PT 06/07/18 1128      Row Name 06/07/18 1055             Static Standing Balance    Level of [place] (Supported Standing, Static Balance) maximal assist, 25 to 49% patient effort   maxA x2  -KR      Assistive Device Utilized (Supported Standing, Static Balance) other (see comments)   BUE support  -KR      Recorded by [RODERICK] Yennifer Patel, PT 06/07/18 1128      Row Name 06/07/18 1055             Positioning and Restraints    Pre-Treatment Position sitting in chair/recliner  -KR      Post Treatment Position chair  -KR      In Chair notified nsg;reclined;call light within reach;encouraged to call for assist;RUE elevated;LUE elevated;on mechanical lift sling;legs elevated  -KR      Recorded by [RODERICK] Yennifer Patel, PT 06/07/18 1128      Row Name 06/07/18 1055             Pain Assessment    Additional Documentation Pain Scale: Numbers Pre/Post-Treatment (Group)  -KR      Recorded by [RODERICK] Yennifer Patel, PT 06/07/18 1128      Row Name 06/07/18 1055             Pain Scale: Numbers Pre/Post-Treatment    Pain Scale: Numbers, Pretreatment 8/10  -KR      Pain Scale: Numbers, Post-Treatment 8/10  -KR      Pain Location - Orientation incisional  -KR      Pain Location  chest  -KR      Pain Intervention(s) Repositioned;Ambulation/increased activity  -KR      Recorded by [KR] Yennifer Patel, PT 06/07/18 1128      Row Name                Wound 06/04/18 0826 chest incision    Wound - Properties Group Date first assessed: 06/04/18 [SH] Time first assessed: 0826 [SH] Location: chest [SH] Type: incision [SH] Recorded by:  [SH] Sherri L Haase, RN 06/04/18 0826    Row Name                Wound 06/04/18 0917 Right leg incision    Wound - Properties Group Date first assessed: 06/04/18 [SH] Time first assessed: 0917 [SH] Side: Right [SH] Location: leg [SH] Type: incision [SH] Recorded by:  [SH] Sherri L Haase, RN 06/04/18 0917    Row Name                Wound 06/04/18 1454 Left surgical    Wound - Properties Group Date first assessed: 06/04/18 [JOHNNY] Time first assessed: 1454 [JOHNNY] Side: Left [JOHNNY] Type: surgical [JOHNNY] Recorded by:  [JOHNNY] Rajiv Bryson RN 06/04/18 1454    Row Name 06/07/18 1055             Plan of Care Review    Plan of Care Reviewed With patient  -KR      Recorded by [KR] Yennifer Patel, PT 06/07/18 1128      Row Name 06/07/18 1055             Outcome Summary/Treatment Plan (PT)    Daily Summary of Progress (PT) progress toward functional goals is gradual  -KR      Recorded by [KR] Yennifer Patel, PT 06/07/18 1128        User Key  (r) = Recorded By, (t) = Taken By, (c) = Cosigned By    Initials Name Effective Dates Discipline    SH Sherri L Haase, RN 06/16/16 -  Nurse    JOHNNY Bryson RN 06/16/16 -  Nurse    RODERICK Patel, PT 04/03/18 -  PT          Wound 06/04/18 0826 chest incision (Active)   Dressing Appearance dry;intact 6/7/2018  8:00 AM   Periwound intact;dry 6/7/2018  8:00 AM   Periwound Temperature warm 6/7/2018  8:00 AM   Drainage Amount none 6/7/2018  8:00 AM   Care, Wound cleansed with;antimicrobial agent applied 6/6/2018  8:00 PM       Wound 06/04/18 0917 Right leg incision (Active)   Dressing Appearance dry;intact 6/7/2018  8:00 AM   Closure Staples  6/7/2018  8:00 AM   Periwound dry;intact 6/7/2018  8:00 AM   Drainage Amount scant 6/7/2018  8:00 AM   Care, Wound cleansed with;antimicrobial agent applied 6/6/2018  8:00 PM       Wound 06/04/18 1454 Left surgical (Active)   Dressing Appearance dry;intact 6/7/2018  8:00 AM   Closure Staples 6/7/2018  8:00 AM   Drainage Amount none 6/7/2018  8:00 AM   Care, Wound cleansed with;antimicrobial agent applied 6/6/2018  8:00 PM             Physical Therapy Education     Title: PT OT SLP Therapies (Active)     Topic: Physical Therapy (Active)     Point: Mobility training (Active)    Learning Progress Summary     Learner Status Readiness Method Response Comment Documented by    Patient Active Acceptance E NR  KR 06/07/18 1128     Active Acceptance E NR  KR 06/06/18 1138     Active Acceptance E,D NR  JOHNNY 06/05/18 1013    Father Active Acceptance E,D NR  JOHNNY 06/05/18 1013          Point: Home exercise program (Active)    Learning Progress Summary     Learner Status Readiness Method Response Comment Documented by    Patient Active Acceptance E NR  KR 06/07/18 1128     Active Acceptance E NR  KR 06/06/18 1138     Active Acceptance E,D NR  JOHNNY 06/05/18 1013    Father Active Acceptance E,D NR  JOHNNY 06/05/18 1013          Point: Body mechanics (Active)    Learning Progress Summary     Learner Status Readiness Method Response Comment Documented by    Patient Active Acceptance E NR  KR 06/07/18 1128     Active Acceptance E NR  KR 06/06/18 1138     Active Acceptance E,D NR  JOHNNY 06/05/18 1013    Father Active Acceptance E,D NR  JOHNNY 06/05/18 1013          Point: Precautions (Active)    Learning Progress Summary     Learner Status Readiness Method Response Comment Documented by    Patient Active Acceptance E NR  KR 06/07/18 1128     Active Acceptance E NR  KR 06/06/18 1138     Active Acceptance E,D NR  JOHNNY 06/05/18 1013    Father Active Acceptance E,D NR  JOHNNY 06/05/18 1013                      User Key     Initials Effective Dates Name Provider  Type Discipline    KR 04/03/18 -  Yennifer Patel, PT Physical Therapist PT    JOHNNY 05/15/18 -  Ki Workman, PT Student PT Student PT                    PT Recommendation and Plan  Therapy Frequency (PT Clinical Impression): daily  Outcome Summary/Treatment Plan (PT)  Daily Summary of Progress (PT): progress toward functional goals is gradual  Plan of Care Reviewed With: patient  Progress: no change  Outcome Summary: Pt performed STS x3 with maxA x2. Pt able to achieve full upright posture on last attempt. Pt required increased encouragement to participate; limited by c/o increased pain.           Outcome Measures     Row Name 06/07/18 1055 06/06/18 0925 06/05/18 1000       How much help from another person do you currently need...    Turning from your back to your side while in flat bed without using bedrails? 2  -KR 2  -KR 1  -JOHNNY (r) JBA (t) JOHNNY (c)    Moving from lying on back to sitting on the side of a flat bed without bedrails? 1  -KR 1  -KR 1  -JOHNNY (r) JBA (t) JOHNNY (c)    Moving to and from a bed to a chair (including a wheelchair)? 1  -KR 1  -KR 1  -JOHNNY (r) JBA (t) JOHNNY (c)    Standing up from a chair using your arms (e.g., wheelchair, bedside chair)? 2  -KR 2  -KR 2  -JOHNNY (r) JBA (t) JOHNNY (c)    Climbing 3-5 steps with a railing? 1  -KR 1  -KR 1  -JOHNNY (r) JBA (t) JOHNNY (c)    To walk in hospital room? 1  -KR 1  -KR 1  -JOHNNY (r) JBA (t) JOHNNY (c)    AM-PAC 6 Clicks Score 8  -KR 8  -KR 7  -JOHNNY (r) JBA (t)       Functional Assessment    Outcome Measure Options AM-PAC 6 Clicks Basic Mobility (PT)  -KR AM-PAC 6 Clicks Basic Mobility (PT)  -KR AM-PAC 6 Clicks Basic Mobility (PT)  -JOHNNY (r) JBA (t) JOHNNY (c)      User Key  (r) = Recorded By, (t) = Taken By, (c) = Cosigned By    Initials Name Provider Type    JOHNNY Saha, PT Physical Therapist    RODERICK Patel, PT Physical Therapist    SULY Workman, PT Student PT Student           Time Calculation:         PT Charges     Row Name 06/07/18 1133             Time Calculation     Start Time 1055  -KR      PT Received On 06/07/18  -KR      PT Goal Re-Cert Due Date 06/15/18  -KR         Time Calculation- PT    Total Timed Code Minutes- PT 23 minute(s)  -KR        User Key  (r) = Recorded By, (t) = Taken By, (c) = Cosigned By    Initials Name Provider Type    KR Yennifer Patel, PT Physical Therapist          Therapy Charges for Today     Code Description Service Date Service Provider Modifiers Qty    34029244023 HC PT THER PROC EA 15 MIN 6/6/2018 Yennifer Patel, PT GP 2    51456978815 HC PT THER SUPP EA 15 MIN 6/6/2018 Yennifer Patel, PT GP 2    68541889012 HC PT THER PROC EA 15 MIN 6/7/2018 Yennifer Patel, PT GP 2    75587602235 HC PT THER SUPP EA 15 MIN 6/7/2018 Yennifer Patel, PT GP 2          PT G-Codes  Outcome Measure Options: AM-PAC 6 Clicks Basic Mobility (PT)    Garima Patel PT  6/7/2018

## 2018-06-07 NOTE — PROGRESS NOTES
"[facility]  IP Progress Note   LOS: 9 days   Patient Care Team:  Ceasr Neri MD as PCP - General  Cesar Neri MD as PCP - Family Medicine  Cesar Neri MD as PCP - Claims Attributed  Ernestine Barajas, RN as Care Coordinator (Population Health)    Chief Complaint: Follow up for Coronary Artery Disease/acute MI/dyslipidemia.    Subjective    Stable, sitting up in chair, eating breakfast.  Continues to complain of ongoing sternal pain and demands more pain medications.  Started on levo fed last night due to low blood pressure, blood pressure stable now, to be weaned off.      Tele: Sinus Rythym    Vitals:  Blood pressure 110/58, pulse 84, temperature 98.9 °F (37.2 °C), temperature source Oral, resp. rate 20, height 182.9 cm (72.01\"), weight 135 kg (297 lb 9.9 oz), SpO2 100 %.     Intake/Output Summary (Last 24 hours) at 06/07/18 0807  Last data filed at 06/07/18 0600   Gross per 24 hour   Intake              862 ml   Output              915 ml   Net              -53 ml       Physical Exam:  General: No apparent distress  Neck: no JVD.  Chest:No respiratory distress, breath sounds are normal. No wheezes,  rhonchi or rales.  Cardiovascular: Normal S1 and S2, no murmer, gallop or rub.    Extremities: No edema.    Results Review:     I reviewed the patient's new clinical results.      Results from last 7 days  Lab Units 06/07/18  0351   WBC 10*3/mm3 11.18*   HEMOGLOBIN g/dL 7.5*   HEMATOCRIT % 24.0*   PLATELETS 10*3/mm3 154       Results from last 7 days  Lab Units 06/07/18  0350  06/04/18  0511   SODIUM mmol/L 135  < > 135   POTASSIUM mmol/L 4.2  < > 5.1   CHLORIDE mmol/L 94*  < > 105   CO2 mmol/L 25.0  < > 22.0   BUN mg/dL 27*  < > 22   CREATININE mg/dL 1.62*  < > 1.40*   CALCIUM mg/dL 8.3*  < > 8.3*   BILIRUBIN mg/dL  --   --  0.4   ALK PHOS U/L  --   --  120*   ALT (SGPT) U/L  --   --  37   AST (SGOT) U/L  --   --  35*   GLUCOSE mg/dL 102*  < > 98   < > = values in " this interval not displayed.    Scheduled Meds:  aspirin 325 mg Oral Daily   atorvastatin 80 mg Oral Nightly   buPROPion  mg Oral Daily   citalopram 40 mg Oral Daily   febuxostat 40 mg Oral Daily   heparin (porcine) 5,000 Units Subcutaneous Q8H   insulin lispro 0-9 Units Subcutaneous 4x Daily With Meals & Nightly   levothyroxine 50 mcg Oral Q AM   metoprolol tartrate 12.5 mg Oral Q12H   Morphine 30 mg Oral Q12H   pantoprazole 40 mg Oral Q AM   pharmacy consult - MTM  Does not apply Daily   polyethylene glycol 17 g Oral Daily   sennosides-docusate sodium 2 tablet Oral BID       Continuous Infusions:  norepinephrine 0.02-0.3 mcg/kg/min Last Rate: 0.02 mcg/kg/min (06/07/18 0600)         Assessment and Plan:   1. CAD with recurrent NSTEMI  - Echo EF 40%  - s/p 2 vessel CABG today 6/4/18  per Dr. Leggett   - hemodynamically stable, currently NSR, vasopressors restarted last night, to be DC'd this morning  - continue routine post-op care   2. HLD  3. Tobacco abuse, Cessation discussed.  4. KENNETH, worsening, continue to monitor.  5. Chronic pain, acute exacerbation post CABG.  Further pain management per ICU team/CT surgery     Blood pressure too low for beta blockers/ACE inhibitor/ARB/Entresto.  Wean off/discontinue levofed.  Transfer to telemetry okay with us.  Needs comprehensive physical therapy and probable discharge to a rehabilitation facility.    ROBBIN Talavera  06/07/18  8:07 AM    I have seen and examined the patient, case was discussed with the physician extender, reviewed the above note, necessary changes were made and I agree with the final note.   Devorah Hightower MD, FACC, [facility]

## 2018-06-07 NOTE — NURSING NOTE
Pt. Vitals stable, up with lift, max assist,APRN consulted for pt. Pain management, Pt. On high flow nasal canula, refused to wear BIPAP, on levophed titrating per protocol. Will continue to monitor.

## 2018-06-07 NOTE — PROGRESS NOTES
CTS Progress Note      POD 3 s/p CABGX2       LOS: 9 days   Patient Care Team:  Cesar Neri MD as PCP - General  Cesar Neri MD as PCP - Family Medicine  Cesar Neri MD as PCP - Claims Attributed  Ernestine Barajas, RN as Care Coordinator (Population Health)    Subjective  Usual pain  Low dose levophed for support    CC: CABGX2    Objective    Vital Signs  Temp:  [98.1 °F (36.7 °C)-98.9 °F (37.2 °C)] 98.9 °F (37.2 °C)  Heart Rate:  [66-81] 75  Resp:  [18-22] 20  BP: ()/(46-77) 96/54    Physical Exam:   General Appearance: alert, appears stated age and cooperative   Lungs: clear to auscultation, respirations regular, respirations even and respirations unlabored   Heart: regular rhythm & normal rate, normal S1, S2, no murmur, no tor, no rub and no click   Skin:  Incision c/d/i     Results     Results from last 7 days  Lab Units 06/07/18  0351   WBC 10*3/mm3 11.18*   HEMOGLOBIN g/dL 7.5*   HEMATOCRIT % 24.0*   PLATELETS 10*3/mm3 154       Results from last 7 days  Lab Units 06/07/18  0350   SODIUM mmol/L 135   POTASSIUM mmol/L 4.2   CHLORIDE mmol/L 94*   CO2 mmol/L 25.0   BUN mg/dL 27*   CREATININE mg/dL 1.62*   GLUCOSE mg/dL 102*   CALCIUM mg/dL 8.3*           Imaging Results (last 24 hours)     Procedure Component Value Units Date/Time    XR Chest 1 View [[id number]] Collected:  06/06/18 0926     Updated:  06/06/18 0932    Narrative:       EXAMINATION: XR CHEST 1 VW-      INDICATION: Postop heart surgery; I25.119-Atherosclerotic heart disease  of native coronary artery with unspecified angina pectoris; I21.4-Non-ST  elevation (NSTEMI) myocardial infarction; Z74.09-Other reduced mobility.        COMPARISON: 06/05/2018.     FINDINGS: There is right basilar atelectasis. Cardiac silhouette is  normal.  A [place]-Madisyn catheter has been removed since the previous  examination.       Impression:       Right basilar airspace disease, largely reflecting  atelectasis. There is also a probable small effusion.  There has been no  significant change when compared with 06/05/2018.     D:  06/06/2018  E:  06/06/2018     This report was finalized on 6/6/2018 9:30 AM by Dr. Wilfredo Mcrae MD.             Assessment    Principal Problem:    CAD with previous history of stents. Most recent stent 3/16/18  Active Problems:    Hypertension    Dyslipidemia    History of leukemia    Neuropathy due to chemotherapeutic drug    Tobacco abuse    Obesity    NSTEMI 5/29/18 s/p thrombectomy and PTCA    KENNETH (acute kidney injury)    Wheelchair bound    S/P CABG x 2 on 6/4/18  CV stable  Pain is chronic problem S/P radiation      Plan   Wean levophed then to tele    ROBBIN Pfeiffer  06/07/18  7:15 AM     Assessment noted above.  Pain remains a major issue.  Levophed is almost off.  Hopefully to telemetry in the a.m.   I have reviewed, verified, and confirmed the above history and current status.  I have examined the patient and confirmed the above physical findings.    Zbigniew Coleman MD  CTSurgery  06/07/18   3:57 PM

## 2018-06-07 NOTE — PROGRESS NOTES
"                  Clinical Nutrition     Nutrition Assessment  Reason for Visit:   MDR, Follow-up protocol    Patient Name: Paulie Jordan  YOB: 1980  MRN: [id number]  Date of Encounter: 06/07/18 1:29 PM  Admission date: 5/29/2018    Nutrition Assessment     Hospital Problem List  Principal Problem:    CAD with previous history of stents. Most recent stent 3/16/18  Active Problems:    Hypertension    Dyslipidemia    History of leukemia    Neuropathy due to chemotherapeutic drug    Tobacco abuse    Obesity    NSTEMI 5/29/18 s/p thrombectomy and PTCA    KENNETH (acute kidney injury)    Wheelchair bound    S/P CABG x 2 on 6/4/18    Reported/Observed/Food/Nutrition Related History:     Not eating much.  Willing to try a nutritional supplement with lunch and dinner.      Continues to have an issue with pain control per RN.     Anthropometrics     Height: 182.9 cm (72.01\")  Last filed wt: Weight: 135 kg (297 lb 9.9 oz) (06/04/18 1205)  Weight Method: Stated    Labs reviewed       Results from last 7 days  Lab Units 06/07/18  0350  06/04/18  0511   SODIUM mmol/L 135  < > 135   POTASSIUM mmol/L 4.2  < > 5.1   CHLORIDE mmol/L 94*  < > 105   CO2 mmol/L 25.0  < > 22.0   BUN mg/dL 27*  < > 22   CREATININE mg/dL 1.62*  < > 1.40*   CALCIUM mg/dL 8.3*  < > 8.3*   BILIRUBIN mg/dL  --   --  0.4   ALK PHOS U/L  --   --  120*   ALT (SGPT) U/L  --   --  37   AST (SGOT) U/L  --   --  35*   GLUCOSE mg/dL 102*  < > 98   < > = values in this interval not displayed.      Results from last 7 days  Lab Units 06/07/18  1131 06/07/18  1049 06/07/18  0716 06/06/18  2126 06/06/18  1601 06/06/18  1125   GLUCOSE mg/dL 83 64* 84 114 113 90     Medications reviewed   Pertinent:  Senokot, Protonix, Synthroid,     Current Nutrition Prescription     PO: Diet Regular; Cardiac    Intake/Delivery:  PO Evaluation    Number of Days PO Intake Evaluated:  2 days    Number of Meals:  3 meals    % of PO Intake: 25%       Nutrition Diagnosis "     Problem  Nutrition Diagnoses Problem 1   Problem 1:            Inadequate oral intake      Etiology (related to): Clinical status: PO intake    Signs/Symptoms: Poor appetite after surgery    (evidence by)    Nutrition Intervention     Interventions Goal    General: Nutrition support treatment    Nutrition Interventions   1.  Follow treatment progress, Care plan reviewed, Interview for preferences, Supplement provided    Provide Boost H.P. With meals until PO intake improves.     Monitor/ Evaluation    Per protocol, PO intake, Supplement intake, Pertinent labs      Will Continue to follow per protocol      Felisha Aranda RD  Time Spent: 30min

## 2018-06-08 PROBLEM — F11.90 CHRONIC NARCOTIC USE: Status: ACTIVE | Noted: 2018-01-01

## 2018-06-08 NOTE — PROGRESS NOTES
"[facility]  IP Progress Note   LOS: 10 days   Patient Care Team:  Cesar Neri MD as PCP - General  Cesar Neri MD as PCP - Family Medicine  Cesar Neri MD as PCP - Claims Attributed  Ernestine Barajas, RN as Care Coordinator (Population Health)    Chief Complaint: Follow up for Coronary Artery Disease/non-STEMI    Subjective    Sitting up in chair, receiving a breathing treatment.  Still has soreness in chest, unable to ambulate.  He has been refusing physical therapy.  Of all vasopressors.  Awaiting transfer to telemetry.      Tele: Sinus Rythym    Vitals:  Blood pressure 111/58, pulse 87, temperature 99.1 °F (37.3 °C), temperature source Oral, resp. rate 18, height 182.9 cm (72.01\"), weight 135 kg (297 lb 9.9 oz), SpO2 (!) 75 %.     Intake/Output Summary (Last 24 hours) at 06/08/18 0752  Last data filed at 06/08/18 0600   Gross per 24 hour   Intake             1492 ml   Output              830 ml   Net              662 ml       Physical Exam:    General: alert, no acute distress, acyanotic, well developed, well nourished   Chest: Clear Auscultation and No Wheezes   CV: Heart sounds are normal.  Regular rate and rhythm without murmur, gallop or rub.   Extremities: negative, No edema    Results Review:     I reviewed the patient's new clinical results.      Results from last 7 days  Lab Units 06/08/18  0518   WBC 10*3/mm3 8.49   HEMOGLOBIN g/dL 7.4*   HEMATOCRIT % 23.8*   PLATELETS 10*3/mm3 183       Results from last 7 days  Lab Units 06/08/18  0518  06/04/18  0511   SODIUM mmol/L 137  < > 135   POTASSIUM mmol/L 4.3  < > 5.1   CHLORIDE mmol/L 105  < > 105   CO2 mmol/L 26.0  < > 22.0   BUN mg/dL 28*  < > 22   CREATININE mg/dL 1.48*  < > 1.40*   CALCIUM mg/dL 8.2*  < > 8.3*   BILIRUBIN mg/dL  --   --  0.4   ALK PHOS U/L  --   --  120*   ALT (SGPT) U/L  --   --  37   AST (SGOT) U/L  --   --  35*   GLUCOSE mg/dL 108*  < > 98   < > = values in this interval not " displayed.    Scheduled Meds:  albuterol 2.5 mg Nebulization 4x Daily - RT   aspirin 325 mg Oral Daily   atorvastatin 80 mg Oral Nightly   buPROPion  mg Oral Daily   citalopram 40 mg Oral Daily   febuxostat 40 mg Oral Daily   heparin (porcine) 5,000 Units Subcutaneous Q8H   levothyroxine 50 mcg Oral Q AM   metoprolol tartrate 12.5 mg Oral Q12H   Morphine 45 mg Oral Q12H   pantoprazole 40 mg Oral Q AM   pharmacy consult - MTM  Does not apply Daily   polyethylene glycol 17 g Oral Daily   sennosides-docusate sodium 2 tablet Oral BID         Assessment and Plan:   1. CAD with recurrent NSTEMI  - Echo EF 40%  - s/p 2 vessel CABG today 6/4/18  per Dr. Leggett   - hemodynamically stable, currently NSR, off all vasopressors.  - Continue current management.  2. HLD  3. Tobacco abuse, Cessation discussed.  4. KENNETH, worsening, continue to monitor.  5. Chronic pain, acute exacerbation post CABG.  Further pain management per ICU team/CT surgery     Blood pressure too low for ACE ihibitor/ARB/Entresto.  Started on low-dose metoprolol.  Transfer to telemetry today.  Needs comprehensive physical therapy and probable discharge to a rehabilitation facility.  Nothing more to add from cardiology standpoint, we will revisit on Monday, June 11, 2018 if still here, please call if needed sooner.    ROBBIN Talavera  06/08/18  7:52 AM    I have seen and examined the patient, case was discussed with the physician extender, reviewed the above note, necessary changes were made and I agree with the final note.   Devorah Hightower MD, FACC, [facility]

## 2018-06-08 NOTE — THERAPY TREATMENT NOTE
Acute Care - Physical Therapy Treatment Note  [facility]     Patient Name: Paulie Jordan  : 1980  MRN: [id number]  Today's Date: 2018  Onset of Illness/Injury or Date of Surgery: 18  Date of Referral to PT: 18  Referring Physician: ROBBIN Escalante    Admit Date: 2018    Visit Dx:    ICD-10-CM ICD-9-CM   1. Coronary artery disease involving native coronary artery of native heart with angina pectoris I25.119 414.01     413.9   2. NSTEMI (non-ST elevated myocardial infarction) I21.4 410.70   3. Impaired functional mobility, balance, gait, and endurance Z74.09 V49.89     Patient Active Problem List   Diagnosis   • Hypertension   • CAD with previous history of stents. Most recent stent 3/16/18   • Dyslipidemia   • History of leukemia   • Neuropathy due to chemotherapeutic drug. Wheelchair bound with chronic pain   • Tobacco abuse   • Obesity   • NSTEMI 18 s/p thrombectomy and PTCA   • KENNETH (acute kidney injury)   • Wheelchair bound   • S/P CABG x 2 on 18   • Chronic narcotic use       Therapy Treatment          Rehabilitation Treatment Summary     Row Name 18 0900             Treatment Time/Intention    Discipline (P)  physical therapist  -CM      Document Type (P)  therapy note (daily note)  -CM      Subjective Information (P)  complains of;fatigue;pain;weakness  -CM      Mode of Treatment (P)  physical therapy  -CM      Care Plan Review (P)  care plan/treatment goals reviewed;current/potential barriers reviewed;evaluation/treatment results reviewed;patient/other agree to care plan  -CM      Patient Effort (P)  good  -CM      Comment (P)  Pt req'd encouragment to participate initially; agreed to therapy and gave good effort after explanation  -CM      Existing Precautions/Restrictions (P)  fall;cardiac;sternal  -CM      Recorded by [CM] Heaven Ayala, PT Student 18 0956      Row Name 18 0900             Vital Signs    Pre Systolic BP Rehab (P)  100  -CM      Pre  Treatment Diastolic BP (P)  76  -CM      Pretreatment Heart Rate (beats/min) (P)  93  -CM      Intratreatment Heart Rate (beats/min) (P)  106  -CM      Posttreatment Heart Rate (beats/min) (P)  101  -CM      Pre SpO2 (%) (P)  94  -CM      O2 Delivery Pre Treatment (P)  room air  -CM      O2 Delivery Intra Treatment (P)  room air  -CM      O2 Delivery Post Treatment (P)  room air  -CM      Pre Patient Position (P)  Sitting  -CM      Intra Patient Position (P)  Standing  -CM      Post Patient Position (P)  Sitting  -CM      Recorded by [CM] Heaven Ayala, PT Student 06/08/18 0956      Row Name 06/08/18 0900             Cognitive Assessment/Intervention    Additional Documentation (P)  Cognitive Assessment/Intervention (Group)  -CM      Recorded by [CM] Heaven Ayala PT Student 06/08/18 0956      Row Name 06/08/18 0900             Cognitive Assessment/Intervention- PT/OT    Affect/Mental Status (Cognitive) (P)  WFL  -CM      Orientation Status (Cognition) (P)  oriented x 4  -CM      Follows Commands (Cognition) (P)  follows two step commands;75-90% accuracy;verbal cues/prompting required  -CM      Cognitive Function (Cognitive) (P)  safety deficit  -CM      Safety Deficit (Cognitive) (P)  mild deficit;safety precautions awareness;at risk behavior observed  -CM      Personal Safety Interventions (P)  fall prevention program maintained;gait belt;nonskid shoes/slippers when out of bed  -CM2      Recorded by [CM] Heaven Ayala, PT Student 06/08/18 0956  [CM2] Heaven Ayala, PT Student 06/08/18 1040      Row Name 06/08/18 0900             Safety Issues, Functional Mobility    Safety Issues Affecting Function (Mobility) (P)  ability to follow commands;safety precaution awareness;problem solving  -CM      Impairments Affecting Function (Mobility) (P)  balance;coordination;endurance/activity tolerance;pain;shortness of breath;strength  -CM      Recorded by [CM] Heaven Ayala PT Student 06/08/18 0956      Row  Name 06/08/18 0900             Bed Mobility Assessment/Treatment    Comment (Bed Mobility) (P)  UIC  -CM      Recorded by [CM] Heaven Ayala, PT Student 06/08/18 0956      Row Name 06/08/18 0900             Transfer Assessment/Treatment    Transfer Assessment/Treatment (P)  squat pivot transfer;sit-stand transfer;stand-sit transfer  -CM      Sit-Stand [place] (Transfers) (P)  moderate assist (50% patient effort);verbal cues;2 person assist  -CM2      Stand-Sit [place] (Transfers) (P)  moderate assist (50% patient effort);2 person assist  -CM2      Recorded by [CM] Heaven Ayala, PT Student 06/08/18 1000  [CM2] Heaven Ayala, PT Student 06/08/18 0956      Row Name 06/08/18 0900             Sit-Stand Transfer    Assistive Device (Sit-Stand Transfers) (P)  --   BUE support   -CM      Recorded by [CM] Heaven Ayala, PT Student 06/08/18 0956      Row Name 06/08/18 0900             Stand-Sit Transfer    Assistive Device (Stand-Sit Transfers) (P)  --   BUE support  -CM      Recorded by [CM] Heaven Ayala, PT Student 06/08/18 0956      Row Name 06/08/18 0900             Squat Pivot Transfer    Assistive Device (Squat Pivot Transfers) (P)  --   BUE  -CM      Squat Pivot [place] (Transfers) (P)  moderate assist (50% patient effort);other (see comments)   mod A x3; stand-pivot from recliner to w/c   -CM      Recorded by [CM] Heaven Ayala, PT Student 06/08/18 1000      Row Name 06/08/18 0900             Gait/Stairs Assessment/Training    Gait/Stairs Assessment/Training (P)  other (see comments)   DNT; Pt was non-ambulatory prior  -CM      Recorded by [CM] Heaven Ayala, PT Student 06/08/18 0956      Row Name 06/08/18 0900             Lower Extremity Seated Therapeutic Exercise    Performed, Seated Lower Extremity (Therapeutic Exercise) (P)  other (see comments)   quad sets; reclined in chair  -CM      Exercise Type, Seated Lower Extremity (Therapeutic Exercise) (P)  isometric contraction,  static  -CM      Sets/Reps Detail, Seated Lower Extremity (Therapeutic Exercise) (P)  1/10  -CM      Recorded by [CM] Heaven Ayala, PT Student 06/08/18 0956      Row Name 06/08/18 0900             Balance    Balance (P)  static sitting balance;static standing balance  -CM      Recorded by [CM] Heaven Ayala, PT Student 06/08/18 0956      Row Name 06/08/18 0900             Static Sitting Balance    Level of [place] (Unsupported Sitting, Static Balance) (P)  contact guard assist  -CM      Sitting Position (Unsupported Sitting, Static Balance) (P)  sitting in chair  -CM      Time Able to Maintain Position (Unsupported Sitting, Static Balance) (P)  45 to 60 seconds  -CM      Recorded by [CM] Heaven Ayala, PT Student 06/08/18 0956      Row Name 06/08/18 0900             Static Standing Balance    Level of [place] (Supported Standing, Static Balance) (P)  moderate assist, 50 to 74% patient effort  -CM      Time Able to Maintain Position (Supported Standing, Static Balance) (P)  less than 15 seconds  -CM      Assistive Device Utilized (Supported Standing, Static Balance) (P)  --   BUE support  -CM      Comment (Supported Standing, Static Balance) (P)  VC's req'd for pt to stand upright;   -CM      Recorded by [CM] Heaven Ayala, PT Student 06/08/18 0956      Row Name 06/08/18 0900             Positioning and Restraints    Pre-Treatment Position (P)  sitting in chair/recliner  -CM      Post Treatment Position (P)  wheelchair  -CM      In Wheelchair (P)  notified nsg;sitting;call light within reach   BLE's on w/c foot rests   -CM2      Recorded by [CM] Heaven Ayala, PT Student 06/08/18 0956  [CM2] Heaven Ayala, PT Student 06/08/18 0957      Row Name 06/08/18 0900             Pain Assessment    Additional Documentation (P)  Pain Scale: Numbers Pre/Post-Treatment (Group)  -CM      Recorded by [CM] Heaven Ayala, PT Student 06/08/18 1000      Row Name 06/08/18 0900             Pain Scale:  Numbers Pre/Post-Treatment    Pain Scale: Numbers, Pretreatment (P)  7/10  -CM      Pain Scale: Numbers, Post-Treatment (P)  7/10  -CM      Pain Location - Orientation (P)  generalized  -CM      Pain Location (P)  chest   generalized pain to multiple areas of body   -CM      Pain Intervention(s) (P)  Repositioned;Ambulation/increased activity;Splinting  -CM      Recorded by [CM] Heaven Ayala, PT Student 06/08/18 1000      Row Name                Wound 06/04/18 0826 chest incision    Wound - Properties Group Date first assessed: 06/04/18 [SH] Time first assessed: 0826 [SH] Location: chest [SH] Type: incision [SH] Recorded by:  [SH] Sherri L Haase, RN 06/04/18 0826    Row Name                Wound 06/04/18 0917 Right leg incision    Wound - Properties Group Date first assessed: 06/04/18 [SH] Time first assessed: 0917 [SH] Side: Right [SH] Location: leg [SH] Type: incision [SH] Recorded by:  [SH] Sherri L Haase, RN 06/04/18 0917    Row Name                Wound 06/04/18 1454 Left surgical    Wound - Properties Group Date first assessed: 06/04/18 [JOHNNY] Time first assessed: 1454 [JOHNNY] Side: Left [JOHNNY] Type: surgical [JOHNNY] Recorded by:  [JOHNNY] Rajiv Bryson RN 06/04/18 1454    Row Name 06/08/18 0900             Coping    Observed Emotional State (P)  agitated;apprehensive;cooperative  -CM      Verbalized Emotional State (P)  acceptance  -CM      Recorded by [CM] Heaven Ayala, PT Student 06/08/18 1000      Row Name 06/08/18 0900             Plan of Care Review    Plan of Care Reviewed With (P)  patient  -CM      Recorded by [CM] Heaven Ayala, PT Student 06/08/18 1000      Row Name 06/08/18 0900             Outcome Summary/Treatment Plan (PT)    Daily Summary of Progress (PT) (P)  progress toward functional goals is good  -CM      Recorded by [CM] Heaven Ayala, PT Student 06/08/18 1000        User Key  (r) = Recorded By, (t) = Taken By, (c) = Cosigned By    Initials Name Effective Dates Discipline    SH  Sherri L Haase, RN 06/16/16 -  Nurse    JOHNNY Bryson, HONEY 06/16/16 -  Nurse    OMAR Ayala, PT Student 06/07/18 -  PT          Wound 06/04/18 0826 chest incision (Active)   Dressing Appearance open to air 6/8/2018 10:33 AM   Closure Staples;Liquid skin adhesive 6/8/2018 10:33 AM   Periwound intact;dry 6/7/2018  6:00 PM   Periwound Temperature warm 6/7/2018  6:00 PM   Drainage Amount none 6/7/2018  6:00 PM   Care, Wound cleansed with;wound cleanser 6/7/2018  8:00 PM   Dressing Care, Wound open to air 6/8/2018  8:00 AM       Wound 06/04/18 0917 Right leg incision (Active)   Dressing Appearance open to air 6/8/2018 10:33 AM   Closure Staples 6/8/2018 10:33 AM   Periwound dry;intact 6/7/2018  6:00 PM   Drainage Amount scant 6/7/2018  6:00 PM   Care, Wound cleansed with;wound cleanser 6/7/2018  8:00 PM   Dressing Care, Wound open to air 6/8/2018  8:00 AM       Wound 06/04/18 1454 Left surgical (Active)   Dressing Appearance open to air 6/8/2018 10:33 AM   Closure Staples 6/8/2018 10:33 AM   Drainage Amount none 6/7/2018  6:00 PM   Care, Wound cleansed with;wound cleanser 6/7/2018  8:00 PM   Dressing Care, Wound open to air 6/8/2018  8:00 AM             Physical Therapy Education     Title: PT OT SLP Therapies (Active)     Topic: Physical Therapy (Active)     Point: Mobility training (Active)    Learning Progress Summary     Learner Status Readiness Method Response Comment Documented by    Patient Active Acceptance E,D NR  CM 06/08/18 1040     Active Acceptance E NR  KR 06/07/18 1128     Active Acceptance E NR  KR 06/06/18 1138     Active Acceptance E,D NR  JOHNNY 06/05/18 1013    Father Active Acceptance E,D NR  JOHNNY 06/05/18 1013          Point: Home exercise program (Active)    Learning Progress Summary     Learner Status Readiness Method Response Comment Documented by    Patient Active Acceptance E,D NR  CM 06/08/18 1040     Active Acceptance E NR  KR 06/07/18 1128     Active Acceptance E NR  RODERICK 06/06/18 1134      Active Acceptance E,D NR  JOHNNY 06/05/18 1013    Father Active Acceptance E,D NR  JOHNNY 06/05/18 1013          Point: Body mechanics (Active)    Learning Progress Summary     Learner Status Readiness Method Response Comment Documented by    Patient Active Acceptance E,D NR  CM 06/08/18 1040     Active Acceptance E NR  KR 06/07/18 1128     Active Acceptance E NR  KR 06/06/18 1138     Active Acceptance E,D NR  JOHNNY 06/05/18 1013    Father Active Acceptance E,D NR  JOHNNY 06/05/18 1013          Point: Precautions (Active)    Learning Progress Summary     Learner Status Readiness Method Response Comment Documented by    Patient Active Acceptance E,D NR  CM 06/08/18 1040     Active Acceptance E NR  KR 06/07/18 1128     Active Acceptance E NR  KR 06/06/18 1138     Active Acceptance E,D NR  JOHNNY 06/05/18 1013    Father Active Acceptance E,D NR   06/05/18 1013                      User Key     Initials Effective Dates Name Provider Type Discipline     04/03/18 -  Yennifer Patel, PT Physical Therapist PT     05/15/18 -  Ki Workman, PT Student PT Student PT     06/07/18 -  Heaven Ayala, PT Student PT Student PT                    PT Recommendation and Plan     Outcome Summary/Treatment Plan (PT)  Daily Summary of Progress (PT): (P) progress toward functional goals is good  Plan of Care Reviewed With: (P) patient  Progress: (P) improving  Outcome Summary: (P) Pt progressing to increased ability to perform functional transfers w/ less assistance. Pt req'd encouragement to participate in therapy but demonstrated good effort after visual demonstration of tasks and VCing.           Outcome Measures     Row Name 06/08/18 0900 06/07/18 1055 06/06/18 0925       How much help from another person do you currently need...    Turning from your back to your side while in flat bed without using bedrails? (P)  2  -CM 2  -KR 2  -KR    Moving from lying on back to sitting on the side of a flat bed without bedrails? (P)  1  -CM 1  -KR 1   -KR    Moving to and from a bed to a chair (including a wheelchair)? (P)  2  -CM 1  -KR 1  -KR    Standing up from a chair using your arms (e.g., wheelchair, bedside chair)? (P)  2  -CM 2  -KR 2  -KR    Climbing 3-5 steps with a railing? (P)  1  -CM 1  -KR 1  -KR    To walk in hospital room? (P)  1  -CM 1  -KR 1  -KR    AM-PAC 6 Clicks Score (P)  9  -CM 8  -KR 8  -KR       Functional Assessment    Outcome Measure Options (P)  AM-PAC 6 Clicks Basic Mobility (PT)  -CM AM-PAC 6 Clicks Basic Mobility (PT)  -KR AM-PAC 6 Clicks Basic Mobility (PT)  -KR      User Key  (r) = Recorded By, (t) = Taken By, (c) = Cosigned By    Initials Name Provider Type    KR Yenniferlandon Patel, PT Physical Therapist    CM Heaven Ayala, PT Student PT Student           Time Calculation:         PT Charges     Row Name 06/08/18 1048             Time Calculation    Start Time (P)  0900  -CM      PT Received On (P)  06/08/18  -CM         Time Calculation- PT    Total Timed Code Minutes- PT (P)  25 minute(s)  -CM        User Key  (r) = Recorded By, (t) = Taken By, (c) = Cosigned By    Initials Name Provider Type    OMAR Ayala, PT Student PT Student          Therapy Charges for Today     Code Description Service Date Service Provider Modifiers Qty    [id number]  PT THERAPEUTIC ACT EA 15 MIN 6/8/2018 Heaven Ayala, PT Student GP 2          PT G-Codes  Outcome Measure Options: (P) AM-PAC 6 Clicks Basic Mobility (PT)    Heaven Ayala PT Student  6/8/2018

## 2018-06-08 NOTE — PROGRESS NOTES
Continued Stay Note  [facility]     Patient Name: Paulie Jordan  MRN: [id number]  Today's Date: 6/8/2018    Admit Date: 5/29/2018          Discharge Plan     Row Name 06/08/18 1122       Plan    Plan Social work spoke with Mr. Jordan and he said that his family will come pick him up from the hospital when he is discharged from the hospital. He lives next door to his parents and he is wheelchair dependent at home.  His parents assist him at home.  Casemanagement will continue to follow.    Patient/Family in Agreement with Plan yes              Discharge Codes    No documentation.       Expected Discharge Date and Time     Expected Discharge Date Expected Discharge Time    Jun 5, 2018             JAVIER Long

## 2018-06-08 NOTE — NURSING NOTE
"Patient Name:  Paulie Jordan  :  1980  DOS:  18    Hospital Problem List     * (Principal)CAD with previous history of stents. Most recent stent 3/16/18    Overview Addendum 2018  7:47 AM by ROBBIN Kunz     1.   NSTEMI 2016   2. Cardiac catheterization 2016: Single vessel- CAD with total ostial occlusion of LAD. PTCA/stent of LAD with 3.5 x 33 mm ALISHA reducing 100% stenosis to 0%. EF 40-45%   3. Echocardiogram 2016: EF 55%. Trace MR. Trace TR.  4. NSTEMI 2017: LHC demonstrating normal EF, widely patent LAD stent, no obstructive disease   5. Recurrent NSTEMI 2018: treated medically due to recent LHC demonstrating no obstructive disease, felt to be related to small vessel disease vs coronary spasm   6. Echo 2/3/2018: EF 60%  7. NSTEMI 2018 (with transient 1mm FRITZ inferior leads prior to [facility] arrival)   · Cardiac catheterrization  3/16/18 Dr Munguia:  Anteroapical hypokinesia and left ventricular ejection fraction of 54%,  three-vessel nonobstructive coronary disease and a widely patent proximal LAD stent. Despite a \"borderline normal\" iFR, a \"culprit\" lesion in the ostium of his Ramus Intermedians artery as defined by an in lesion MLD of 1.3 mm and a large burden not only of plaque but of \"red\" thrombus status post PTCA and stenting with a 3 x 18 mm Xience Alpine stent.  40% ostial narrowing of a distal branch of the EDYTA with iFR of 0.90   8. NSTEMI, 18:  · LHC   severe 1 vessel coronary artery disease involving a 100% thrombotic ostial LAD stenosis.  The lesion is now status post aspiration thrombectomy and angioplasty.  There is at least a residual 30-40% ostial segment stenosis.  · There is a patent previously placed stent in the ostial ramus intermedius artery with at most a 20% ostial segment narrowing.    9. Coronary artery bypass graft ×2 with EVH.   saphenous vein graft to circumflex and left internal mammary artery to LAD, 18         Hypertension    " Dyslipidemia    History of leukemia    Neuropathy due to chemotherapeutic drug. Wheelchair bound with chronic pain    Tobacco abuse    Obesity    NSTEMI 5/29/18 s/p thrombectomy and PTCA    KENNETH (acute kidney injury)    Wheelchair bound    S/P CABG x 2 on 6/4/18    Chronic narcotic use            Medical records have been reviewed.  Patient is a good candidate for the [facility].  Education provided.  Education time 10 minutes.  Patient to be scheduled follow up one-two weeks post discharge.    · Echo Results:Left ventricular systolic function is mildly decreased. Estimated EF = 40%.  The following left ventricular wall segments are hypokinetic: mid anterior, apical anterior, apical inferior, apical septal, apex hypokinetic and mid anteroseptal.        Heart Failure Quality Measures    ACE I, ARB, ARNI (if EF <40%)     If no contraindications:  Hypotension    Evidence-based Beta Blocker (EF<40%)    (Bisoprolol, Carvedilol, Metoprolol Succinate)  If no contraindications:  Hypotension    Aldosterone Antagonist (EF <40%)  If no contraindications:  KENNETH / CKD / Renal Stenosis    Heart Failure Education    Medications management and adherance, Low Na diet, Signs / symptoms, Daily weight monitoring and Importance of keeping follow up office visits

## 2018-06-08 NOTE — PLAN OF CARE
Problem: Patient Care Overview  Goal: Plan of Care Review  Outcome: Ongoing (interventions implemented as appropriate)   06/08/18 1041   Coping/Psychosocial   Plan of Care Reviewed With patient   Plan of Care Review   Progress improving   OTHER   Outcome Summary Pt progressing to increased ability to perform functional transfers w/ less assistance. Pt req'd encouragement to participate in therapy but demonstrated good effort after visual demonstration of tasks and VCing.

## 2018-06-08 NOTE — PROGRESS NOTES
"Paulie Jordan  [id number]  1980     LOS: 10 days   Patient Care Team:  Cesar Neri MD as PCP - General  Cesar Neri MD as PCP - Family Medicine  Cesar Neri MD as PCP - Claims Attributed  Ernestine Barajas RN as Care Coordinator (Population Health)    Chief Complaint: Coronary artery disease      Subjective: Incisional soreness    Objective:     Vital Sign Min/Max for last 24 hours  Temp  Min: 98.1 °F (36.7 °C)  Max: 99.1 °F (37.3 °C)   BP  Min: 85/47  Max: 115/71   Pulse  Min: 74  Max: 90   Resp  Min: 16  Max: 20   SpO2  Min: 75 %  Max: 100 %   No Data Recorded   No Data Recorded     Flowsheet Rows      First Filed Value   Admission Height  182.9 cm (72\") Documented at 05/29/2018 0100   Admission Weight  129 kg (284 lb) Documented at 05/29/2018 0100          Physical Exam:    Wound:Satisfactory    Pulses:     Mediastinal and Chest Tube Drainage:       Results Review:     Results from last 7 days  Lab Units 06/08/18  0518   WBC 10*3/mm3 8.49   HEMOGLOBIN g/dL 7.4*   HEMATOCRIT % 23.8*   PLATELETS 10*3/mm3 183       Results from last 7 days  Lab Units 06/08/18  0518   SODIUM mmol/L 137   POTASSIUM mmol/L 4.3   CHLORIDE mmol/L 105   CO2 mmol/L 26.0   BUN mg/dL 28*   CREATININE mg/dL 1.48*   GLUCOSE mg/dL 108*   CALCIUM mg/dL 8.2*       Results from last 7 days  Lab Units 06/04/18 2007   PH, ARTERIAL pH units 7.343*   PO2 ART mm Hg 93.3   PCO2, ARTERIAL mm Hg 44.2   HCO3 ART mmol/L 24.0         Assessment    Principal Problem:    CAD with previous history of stents. Most recent stent 3/16/18  Active Problems:    Hypertension    Dyslipidemia    History of leukemia    Neuropathy due to chemotherapeutic drug    Tobacco abuse    Obesity    NSTEMI 5/29/18 s/p thrombectomy and PTCA    KENNETH (acute kidney injury)    Wheelchair bound    S/P CABG x 2 on 6/4/18      Transfer to telemetry        Ramesh Leggett MD  06/08/18  6:35 AM      Please note that portions of this note were completed with a voice " recognition program. Efforts were made to edit the dictations, but words may be mistranscribed

## 2018-06-08 NOTE — PROGRESS NOTES
Intensivist Note     6/8/2018  Hospital Day: 10  4 Days Post-Op      Mr. Paulie Jordan, 38 y.o. male is followed for:  Principal Problem:    CAD with previous history of stents. Most recent stent 3/16/18  Active Problems:    NSTEMI 5/29/18 s/p thrombectomy and PTCA    S/P CABG x 2 on 6/4/18    KENNETH (acute kidney injury)    Hypertension    Dyslipidemia    History of leukemia    Neuropathy due to chemotherapeutic drug. Wheelchair bound with chronic pain    Wheelchair bound    Chronic narcotic use    Tobacco abuse    Obesity       SUBJECTIVE     37-year-old debilitated wheelchair bound white male with a history of CAD, hypertension, hyperlipidemia, IBS, hypothyroidism on replacement, and leukemia initially diagnosed in 1998 with recurrence 2000. He is status post chemotherapy and radiation then bone marrow transplant 2000. This left him with a significant neuropathy and inability to ambulate.     He suffered a NSTEMI requiring cardiac catheterization and LAD stent placement 8/2016. Had a recurrent  NSTEMI 9/2017 for NSTEMI, but catheterization revealed only nonobstructive disease and a patent LAD stent. He then suffered a recurrent NSTEMI 2/2018 which was treated medically. His last NSTEMI occurred 3/2018 and repeat catheterization was performed. This suggested a culprit lesion for which he underwent PTCA and stent to the Ramus intermedius artery. Did well until 5/25/18 when he developed severe chest pain which responded to nitroglycerin.Pain recurred 5/28/18 and he presented to [facility] where he was again diagnosed with NSTEMI. Was transferred to [facility] 5/29/18 and underwent catheterization revealing occluded ostial LAD for which he underwent thrombectomy and angioplasty. His previous stent in the ostial ramus intermedius artery apparently remained patent. Because of residual disease 6/4/18 he underwent uneventful two-vessel CABG and was able to be weaned and extubated same day. Note that preoperative LVEF was  "40% and postop cardiac index was 2.0.      Patient came off all pressors 6/5/18 and MT tubes were removed 6/6/18. Says his pain is improved since we increased his home baseline dose of MS Contin 30 mg twice daily to 45 mg twice daily. Despite that fact continues to incessantly asked the nurses for additional pain medications whether it be oral or IV. Is upset that we are going to stop his IV morphine today. Also not very cooperative with respect to mobilization. We realize he has difficulty at home due to his chronic neuropathy, and is wheelchair-bound, but he still needs to work with physical therapy (which he refuses at times). He understands excessive sedation is going to worsen his ventilation. This has finally improved with significant improvement in his atelectasis on chest x-ray and improvement in gas exchange.    The patient's relevant past medical, surgical and social history were reviewed and updated in Epic as appropriate.    OBJECTIVE     /65 (BP Location: Right arm, Patient Position: Lying)   Pulse 82   Temp 99.4 °F (37.4 °C) (Oral)   Resp 20   Ht 182.9 cm (72.01\")   Wt 135 kg (297 lb 9.9 oz)   SpO2 91%   BMI 40.36 kg/m²   Oxygen Concentration (%): 40  Flow (L/min): 2    Flowsheet Rows      First Filed Value   Admission Height  182.9 cm (72\") Documented at 05/29/2018 0100   Admission Weight  129 kg (284 lb) Documented at 05/29/2018 0100        Intake & Output (last day)       06/07 0701 - 06/08 0700 06/08 0701 - 06/09 0700    P.O. 1390     I.V. (mL/kg) 102 (0.8)     Total Intake(mL/kg) 1492 (11.1)     Urine (mL/kg/hr) 830 (0.3) 200 (0.4)    Total Output 830 200    Net +662 -200          Unmeasured Urine Occurrence 2 x           Exam:  General Exam:  Middle-aged white male in no acute distress  HEENT: Pupils equal and reactive. Nose and throat clear.  Neck:                          Supple, no JVD, thyromegaly, or adenopathy. Right IJ line in place  Lungs: Much improved breath sounds in the " bases  Cardiovascular: RRR without murmurs or gallops.  Abdomen: Soft nontender without organomegaly or masses. Obese   and rectal: Deferred.  Extremities: No cyanosis or clubbing but still trace to 1+ lower extremity edema.  Neurologic:                 Symmetric strength, but diffusely weak. No focal deficits.    Chest X-Ray 6/8/18: Significant improvement in atelectasis only a minimal amount remaining in the right base      Results from last 7 days  Lab Units 06/08/18  0518 06/07/18  0351 06/06/18  0350   WBC 10*3/mm3 8.49 11.18* 10.45   HEMOGLOBIN g/dL 7.4* 7.5* 8.2*   HEMATOCRIT % 23.8* 24.0* 26.1*   PLATELETS 10*3/mm3 183 154 138*       Results from last 7 days  Lab Units 06/08/18  0518 06/07/18  0350   SODIUM mmol/L 137 135   POTASSIUM mmol/L 4.3 4.2   CHLORIDE mmol/L 105 94*   CO2 mmol/L 26.0 25.0   BUN mg/dL 28* 27*   CREATININE mg/dL 1.48* 1.62*   GLUCOSE mg/dL 108* 102*   CALCIUM mg/dL 8.2* 8.3*       Results from last 7 days  Lab Units 06/07/18  0350 06/06/18  0350 06/05/18  0342   MAGNESIUM mg/dL 2.4 2.4 2.4   PHOSPHORUS mg/dL 3.1 3.7 3.0       Results from last 7 days  Lab Units 06/04/18  0511   ALK PHOS U/L 120*   BILIRUBIN mg/dL 0.4   ALT (SGPT) U/L 37   AST (SGOT) U/L 35*       Lab Results   Component Value Date    SEDRATE 43 (H) 06/08/2018     Lab Results   Component Value Date    BNP 42.0 05/29/2018     Lab Results   Component Value Date    CKTOTAL 132 03/16/2018    CKMB 11.03 (C) 03/16/2018    CKMBINDEX 8.4 (H) 03/16/2018    TROPONINI 18.261 (C) 05/29/2018     Lab Results   Component Value Date    TSH 4.607 02/03/2018     No results found for: LACTATE  No results found for: CORTISOL      Results from last 7 days  Lab Units 06/04/18 2007 06/04/18  1926 06/04/18  1848 06/04/18  1739 06/04/18  1650   PH, ARTERIAL pH units 7.343* 7.347* 7.331* 7.274* 7.248*   PCO2, ARTERIAL mm Hg 44.2 43.3 45.0 49.1* 51.7*   PO2 ART mm Hg 93.3 102.0 69.0* 68.2* 81.4*   HCO3 ART mmol/L 24.0 23.8 23.7 22.8 22.5    FIO2 % 40 21 40 40 40         I reviewed the patient's results, images and medication.    Assessment/Plan   ASSESSMENT      Principal Problem:    CAD with previous history of stents. Most recent stent 3/16/18  Active Problems:    NSTEMI 5/29/18 s/p thrombectomy and PTCA    S/P CABG x 2 on 6/4/18    KENNETH (acute kidney injury)    Hypertension    Dyslipidemia    History of leukemia    Neuropathy due to chemotherapeutic drug. Wheelchair bound with chronic pain    Wheelchair bound    Chronic narcotic use    Tobacco abuse    Obesity      DISCUSSION: Is definitely improved today and we need to avoid giving into his demands for more and more pain medications. Would normally use NSAID's but with his renal insufficiency cannot do so at this time. I think he is going to need to accept that we cannot get him completely pain-free without causing him harm.    PLAN     1. Transfer to the floor  2. Discontinue IV morphine  3. Avoid over sedation   4. Needs to work with physical therapy  5. We will see prn    Plan of care and goals reviewed with mulitdisciplinary team at daily rounds.    I discussed the patient's findings and my recommendations with patient and nursing staff    Time spent Critical care 20 min (It does not include procedure time).    Reji Villar MD  Intensive Care Medicine  06/08/18 10:48 AM

## 2018-06-08 NOTE — PLAN OF CARE
Problem: Patient Care Overview  Goal: Plan of Care Review  Outcome: Ongoing (interventions implemented as appropriate)   06/08/18 0600   Coping/Psychosocial   Plan of Care Reviewed With patient   Plan of Care Review   Progress improving   OTHER   Outcome Summary cont. complaints of pain throughout night. stayed in chair all night refused to go to bed . educated on importance of shifting in chair     Goal: Individualization and Mutuality  Outcome: Ongoing (interventions implemented as appropriate)   06/08/18 0600   Individualization   Patient Specific Preferences none noted   Patient Specific Goals (Include Timeframe) none noted   Patient Specific Interventions attempts to follow plan of care.   Mutuality/Individual Preferences   What Anxieties, Fears, Concerns, or Questions Do You Have About Your Care? none noted   What Information Would Help Us Give You More Personalized Care? none noted   How Would You and/or Your Support Person Like to Participate in Your Care? none noted   Mutuality/Individual Preferences   How to Address Anxieties/Fears none noted       Problem: Fall Risk (Adult)  Goal: Identify Related Risk Factors and Signs and Symptoms  Outcome: Ongoing (interventions implemented as appropriate)   06/08/18 0600   Fall Risk (Adult)   Related Risk Factors (Fall Risk) gait/mobility problems;polypharmacy;fatigue/slow reaction   Signs and Symptoms (Fall Risk) presence of risk factors     Goal: Absence of Fall  Outcome: Ongoing (interventions implemented as appropriate)   06/08/18 0600   Fall Risk (Adult)   Absence of Fall making progress toward outcome       Problem: Skin Injury Risk (Adult)  Goal: Identify Related Risk Factors and Signs and Symptoms  Outcome: Ongoing (interventions implemented as appropriate)   06/08/18 0600   Skin Injury Risk (Adult)   Related Risk Factors (Skin Injury Risk) body weight extremes;critical care admission;edema;medication;mobility impaired     Goal: Skin Health and  Integrity  Outcome: Ongoing (interventions implemented as appropriate)   06/08/18 0600   Skin Injury Risk (Adult)   Skin Health and Integrity making progress toward outcome       Problem: Cardiac Surgery (Adult)  Goal: Signs and Symptoms of Listed Potential Problems Will be Absent, Minimized or Managed (Cardiac Surgery)  Outcome: Ongoing (interventions implemented as appropriate)   06/08/18 0600   Goal/Outcome Evaluation   Problems Assessed (Cardiac Surgery) all   Problems Present (Cardiac Surgery) bowel motility decreased;pain

## 2018-06-08 NOTE — PROGRESS NOTES
Multidisciplinary Rounds    Time: 20min  Patient Name: Paulie Jordan  Date of Encounter: 06/08/18 10:21 AM  MRN: [id number]  Admission date: 5/29/2018 BRITTNEY PALAFOX to continue to follow per protocol.     Current diet: Diet Regular; Cardiac  Boost + 2x/day    Intervention:  Follow treatment plan  Care plan reviewed    Follow up:   Per protocol      Leatha Joyce RD  10:21 AM

## 2018-06-09 NOTE — PROGRESS NOTES
"    [facility] Medicine Services  PROGRESS NOTE    Patient Name: Paulie Jordan  : 1980  MRN: [id number]    Date of Admission: 2018  Length of Stay: 11  Primary Care Physician: Cesar Neri MD    Subjective     CC: medical management     HPI:  Laying in bed, father at bedside. Notes ongoing post-operative pain and asks for more pain medications. Dad mouthed to me at one point during exam, \"no more meds.\" Otherwise, no new issues. Interested in rehab at discharge. At baseline, he is able to ambulate 5-10 feet but is dependent on his arms to lift out of the chair and he is unable to use his arms like he needs to since surgery.     Review of Systems   Gen- No fevers, chills  CV- No chest pain, palpitations  Resp- No cough, dyspnea  GI- No N/V/D, abd pain    Otherwise ROS is negative except as mentioned in the HPI.    Objective     Vital Signs:   Temp:  [98.1 °F (36.7 °C)-99.8 °F (37.7 °C)] 98.1 °F (36.7 °C)  Heart Rate:  [73-88] 78  Resp:  [18-32] 20  BP: ()/(61-72) 112/70        Physical Exam:  Constitutional: No acute distress, awake, alert and conversant.   HENT: NCAT, mucous membranes moist  Respiratory: Clear to auscultation bilaterally, breath sounds diminished bilaterally without wheezes, rales or rhonchi. Normal respiratory effort.   Cardiovascular: RRR, no murmurs, rubs, or gallops, palpable pedal pulses bilaterally. Sternal incision and EVH sites c/d/i without erythema, induration or drainage, multiple incisions on legs with staples intact  Gastrointestinal: Positive bowel sounds, soft, nontender, nondistended  Musculoskeletal: 2+ pitting BLE edema   Psychiatric: Appropriate affect, cooperative  Neurologic: Oriented x 3, strength symmetric in all extremities, Cranial Nerves grossly intact to confrontation, speech clear  Skin: No rashes    Results Reviewed:  I have personally reviewed current lab, radiology, and data and agree.      Results from last 7 days  Lab " Units 06/09/18  0506 06/08/18  0518 06/07/18  0351 06/06/18  0350   WBC 10*3/mm3  --  8.49 11.18* 10.45   HEMOGLOBIN g/dL 7.3* 7.4* 7.5* 8.2*   HEMATOCRIT % 23.9* 23.8* 24.0* 26.1*   PLATELETS 10*3/mm3  --  183 154 138*       Results from last 7 days  Lab Units 06/09/18  0506 06/08/18  0518 06/07/18  0350   SODIUM mmol/L 134 137 135   POTASSIUM mmol/L 4.7 4.3 4.2   CHLORIDE mmol/L 103 105 94*   CO2 mmol/L 25.0 26.0 25.0   BUN mg/dL 31* 28* 27*   CREATININE mg/dL 1.58* 1.48* 1.62*   GLUCOSE mg/dL 84 108* 102*   CALCIUM mg/dL 8.3* 8.2* 8.3*     Estimated Creatinine Clearance: 88.8 mL/min (A) (by C-G formula based on SCr of 1.58 mg/dL (H)).     No results found for: BNP  No results found for: PHART    Microbiology Results Abnormal     None        Imaging Results (last 24 hours)     Procedure Component Value Units Date/Time    XR Chest 1 View [[id number]] Collected:  06/08/18 0916     Updated:  06/08/18 2258    Narrative:       EXAMINATION: XR CHEST 1 VW-06/08/2018:      INDICATION: Followup atelectasis; I25.119-Atherosclerotic heart disease  of native coronary artery with unspecified angina pectoris; I21.4-Non-ST  elevation (NSTEMI) myocardial infarction; Z74.09-Other reduced mobility.        COMPARISON: 06/06/2018.     FINDINGS: Right IJ sheath is seen in the region of the distal right  internal jugular vein. There is no evidence of pneumothorax. The lung  volumes remain low. There is minimal bibasilar atelectasis. No  pneumothorax is seen.           Impression:       Minimal remaining bibasilar atelectasis. No new chest  disease is seen.     D:  06/08/2018  E:  06/08/2018     This report was finalized on 6/8/2018 10:56 PM by DR. Kerwin Aguilar MD.           Results for orders placed during the hospital encounter of 05/29/18   Adult Transthoracic Echo Complete W/ Cont if Necessary Per Protocol    Narrative · The study is technically difficult for diagnosis.  · Left ventricular systolic function is mildly decreased.  "Estimated EF =   40%.  · The following left ventricular wall segments are hypokinetic: mid   anterior, apical anterior, apical inferior, apical septal, apex   hypokinetic and mid anteroseptal.        I have reviewed the medications.    Assessment / Plan     Hospital Problem List     * (Principal)CAD with previous history of stents. Most recent stent 3/16/18    Overview Addendum 6/5/2018  7:47 AM by ROBBIN Kunz     1.   NSTEMI 8/22/2016   2. Cardiac catheterization 8/22/2016: Single vessel- CAD with total ostial occlusion of LAD. PTCA/stent of LAD with 3.5 x 33 mm ALISHA reducing 100% stenosis to 0%. EF 40-45%   3. Echocardiogram 8/23/2016: EF 55%. Trace MR. Trace TR.  4. NSTEMI 09/2017: LHC demonstrating normal EF, widely patent LAD stent, no obstructive disease   5. Recurrent NSTEMI 02/2018: treated medically due to recent LHC demonstrating no obstructive disease, felt to be related to small vessel disease vs coronary spasm   6. Echo 2/3/2018: EF 60%  7. NSTEMI 03/2018 (with transient 1mm FRITZ inferior leads prior to [facility] arrival)   · Cardiac catheterrization  3/16/18 Dr Munguia:  Anteroapical hypokinesia and left ventricular ejection fraction of 54%,  three-vessel nonobstructive coronary disease and a widely patent proximal LAD stent. Despite a \"borderline normal\" iFR, a \"culprit\" lesion in the ostium of his Ramus Intermedians artery as defined by an in lesion MLD of 1.3 mm and a large burden not only of plaque but of \"red\" thrombus status post PTCA and stenting with a 3 x 18 mm Xience Alpine stent.  40% ostial narrowing of a distal branch of the EDYTA with iFR of 0.90   8. NSTEMI, 5-29-18:  · LHC   severe 1 vessel coronary artery disease involving a 100% thrombotic ostial LAD stenosis.  The lesion is now status post aspiration thrombectomy and angioplasty.  There is at least a residual 30-40% ostial segment stenosis.  · There is a patent previously placed stent in the ostial ramus intermedius artery with at most " a 20% ostial segment narrowing.    9. Coronary artery bypass graft ×2 with EVH.   saphenous vein graft to circumflex and left internal mammary artery to LAD, 6-4-18         Hypertension    Dyslipidemia    History of leukemia    Neuropathy due to chemotherapeutic drug. Wheelchair bound with chronic pain    Tobacco abuse    Obesity    NSTEMI 5/29/18 s/p thrombectomy and PTCA    KENNETH (acute kidney injury)    Wheelchair bound    S/P CABG x 2 on 6/4/18    Chronic narcotic use        Brief Hospital Course to date:  Paulie Jordan is a 38 y.o. male debilitated wheelchair-bound male with a history of CAD, HTN, hyperlipidemia, IBS, hypothyroidism and leukemia (initially diagnosed in 1998 with recurrence in 2000). He is s/p chemotherapy, radiation and bone marrow transplant (2000). This left him with significant neuropathy and inability to ambulate. History of multiple NSTEMIs, most recently 3/2018 for which he underwent PCTA and stent and 5/25/2018. He was transferred to [facility], underwent LHC revealing occluded distal LAD and underwent thrombectomy and angioplasty. Because of residual disease, he underwent CABG x 2 on 6/4/2018.     Home MS Contin has been increased to 45mg PO BID. Despite this, he continues to ask for pain medications (PO or IV) frequently. He intermittently refuses to work with physical therapy. Did have significant residual atelectasis which has nearly resolved. Transferred to the floor 6/8 and hospital medicine is following for medical management.     Coronary artery disease s/p CABG x 2 on 6/4/18 by Dr. Leggett  - Post-operative management per CT surgery   - No further increases in narcotic pain medications. Unfortunately, cannot use NSAIDS due to his renal insufficiency. Explained to him that we will not be able to get him completely pain free and that our goal is to keep his pain manageable     KENNETH  - Creatinine remains elevated. No offending meds to be stopped. Encourage hydration. Recheck labs in AM.       HTN, BP controlled  Hyperlipidemia, statin  Chronic systolic heart failure, cardiology following. Hypotension limiting use of ACEI/ARB or Entresto    Tobacco abuse, continue to encourage cessation. Dip tobacco in his bed during exam today.   Obesity, complicated all aspects of care    Chemotherapy-induced neuropathy  Chronic pain with chronic narcotic use   Wheelchair bound state   - Continue PT/OT, patient/family requesting rehab at discharge - notified CM who will discuss with family on Monday   - Chronic pain issues make his pain control more challenging - further discussion as above    DVT Prophylaxis: Heparin   CODE STATUS: Full Code    Disposition: I expect the patient to be discharged to rehab in a few days.    Electronically signed by Krys Aparicio PA-C, 06/09/18, 12:45 PM.

## 2018-06-09 NOTE — PLAN OF CARE
Problem: Patient Care Overview  Goal: Plan of Care Review  Outcome: Ongoing (interventions implemented as appropriate)   06/09/18 8609   Coping/Psychosocial   Plan of Care Reviewed With patient   Plan of Care Review   Progress no change   OTHER   Outcome Summary pt stable but requesting additional pain meds. advised he needs to get up and work with PT and sit in chair. IV ABX given, ice to lt knee. continue to monitor       Problem: Fall Risk (Adult)  Goal: Identify Related Risk Factors and Signs and Symptoms  Outcome: Ongoing (interventions implemented as appropriate)    Goal: Absence of Fall  Outcome: Ongoing (interventions implemented as appropriate)      Problem: Skin Injury Risk (Adult)  Goal: Identify Related Risk Factors and Signs and Symptoms  Outcome: Ongoing (interventions implemented as appropriate)    Goal: Skin Health and Integrity  Outcome: Ongoing (interventions implemented as appropriate)

## 2018-06-09 NOTE — PROGRESS NOTES
CTS Progress Note      POD5 s/p CABGX2       LOS: 11 days   Patient Care Team:  Cesar Neri MD as PCP - General  Cesar Neri MD as PCP - Family Medicine  Cesar Neri MD as PCP - Claims Attributed  Ernestine Barajas, RN as Care Coordinator (Population Health)    Subjective  Usual pain  Still with moderate pain, chronic    CC: CABGX2    Objective    Vital Signs  Temp:  [98.5 °F (36.9 °C)-99.8 °F (37.7 °C)] 98.5 °F (36.9 °C)  Heart Rate:  [73-88] 77  Resp:  [18-32] 20  BP: ()/(60-72) 111/72    Physical Exam:   General Appearance: alert, appears stated age and cooperative   Lungs: clear to auscultation, respirations regular, respirations even and respirations unlabored   Heart: regular rhythm & normal rate, normal S1, S2, no murmur, no tor, no rub and no click   Skin:  Incision c/d/i     Results     Results from last 7 days  Lab Units 06/09/18  0506 06/08/18  0518   WBC 10*3/mm3  --  8.49   HEMOGLOBIN g/dL 7.3* 7.4*   HEMATOCRIT % 23.9* 23.8*   PLATELETS 10*3/mm3  --  183       Results from last 7 days  Lab Units 06/09/18  0506   SODIUM mmol/L 134   POTASSIUM mmol/L 4.7   CHLORIDE mmol/L 103   CO2 mmol/L 25.0   BUN mg/dL 31*   CREATININE mg/dL 1.58*   GLUCOSE mg/dL 84   CALCIUM mg/dL 8.3*           Imaging Results (last 24 hours)     Procedure Component Value Units Date/Time    XR Chest 1 View [[id number]] Collected:  06/08/18 0916     Updated:  06/08/18 2258    Narrative:       EXAMINATION: XR CHEST 1 VW-06/08/2018:      INDICATION: Followup atelectasis; I25.119-Atherosclerotic heart disease  of native coronary artery with unspecified angina pectoris; I21.4-Non-ST  elevation (NSTEMI) myocardial infarction; Z74.09-Other reduced mobility.        COMPARISON: 06/06/2018.     FINDINGS: Right IJ sheath is seen in the region of the distal right  internal jugular vein. There is no evidence of pneumothorax. The lung  volumes remain low. There is minimal bibasilar atelectasis. No  pneumothorax is seen.            Impression:       Minimal remaining bibasilar atelectasis. No new chest  disease is seen.     D:  06/08/2018  E:  06/08/2018     This report was finalized on 6/8/2018 10:56 PM by DR. Kerwin Aguilar MD.             Assessment    Principal Problem:    CAD with previous history of stents. Most recent stent 3/16/18  Active Problems:    Hypertension    Dyslipidemia    History of leukemia    Neuropathy due to chemotherapeutic drug. Wheelchair bound with chronic pain    Tobacco abuse    Obesity    NSTEMI 5/29/18 s/p thrombectomy and PTCA    KENNETH (acute kidney injury)    Wheelchair bound    S/P CABG x 2 on 6/4/18    Chronic narcotic use  CV stable  Pain is chronic problem S/P radiation      Plan   Begin to ambulate    ROBBIN Pfeiffer  06/09/18  7:37 AM

## 2018-06-09 NOTE — PLAN OF CARE
Problem: Patient Care Overview  Goal: Plan of Care Review  Outcome: Ongoing (interventions implemented as appropriate)   06/09/18 5752   Coping/Psychosocial   Plan of Care Reviewed With patient   Plan of Care Review   Progress no change   OTHER   Outcome Summary pt still requesting additional pain meds. advised he needs to get up and work with PT and sit in chair. pt refused to get up several times. pt requesting rehab at d/c. will continue to monitor

## 2018-06-10 NOTE — PROGRESS NOTES
[facility] Medicine Services  PROGRESS NOTE    Patient Name: Paulie Jordan  : 1980  MRN: [id number]    Date of Admission: 2018  Length of Stay: 12  Primary Care Physician: Cesar Neri MD    Subjective     CC: medical management     HPI:  Laying in bed, no family at bedside. He notes ongoing post-operative pain and asks for more pain medications. Spoke with patient's father yesterday afternoon, he noted a history of opiate abuse following bone marrow transplant. PCP agrees to prescribe them now but only if dad dispenses. Patient lives next door to parents and will call/text dad frequently asking for pain medications. Dad asking about addiction/pain clinic resources.     Review of Systems   Gen- No fevers, chills  CV- No chest pain, palpitations  Resp- No cough, dyspnea  GI- No N/V/D, abd pain    Otherwise ROS is negative except as mentioned in the HPI.    Objective     Vital Signs:   Temp:  [98.3 °F (36.8 °C)-99.3 °F (37.4 °C)] 99 °F (37.2 °C)  Heart Rate:  [69-86] 86  Resp:  [19-20] 19  BP: ()/(53-85) 137/81        Physical Exam:  Constitutional: No acute distress, awake, alert and conversant.   HENT: NCAT, mucous membranes moist  Respiratory: Clear to auscultation bilaterally, breath sounds diminished bilaterally without wheezes, rales or rhonchi. Normal respiratory effort.   Cardiovascular: RRR, no murmurs, rubs, or gallops, palpable pedal pulses bilaterally. Sternal incision and EVH sites c/d/i without erythema, induration or drainage, multiple incisions on legs with staples intact  Gastrointestinal: Positive bowel sounds, soft, nontender, nondistended  Musculoskeletal: 2+ pitting BLE edema   Psychiatric: Appropriate affect, cooperative  Neurologic: Oriented x 3, strength symmetric in all extremities, Cranial Nerves grossly intact to confrontation, speech clear  Skin: No rashes    Results Reviewed:  I have personally reviewed current lab, radiology, and data  and agree.      Results from last 7 days  Lab Units 06/10/18  0612 06/09/18  0506 06/08/18  0518 06/07/18  0351 06/06/18  0350   WBC 10*3/mm3  --   --  8.49 11.18* 10.45   HEMOGLOBIN g/dL 7.6* 7.3* 7.4* 7.5* 8.2*   HEMATOCRIT % 24.6* 23.9* 23.8* 24.0* 26.1*   PLATELETS 10*3/mm3  --   --  183 154 138*       Results from last 7 days  Lab Units 06/10/18  0612 06/09/18  0506 06/08/18  0518   SODIUM mmol/L 136 134 137   POTASSIUM mmol/L 5.3 4.7 4.3   CHLORIDE mmol/L 105 103 105   CO2 mmol/L 23.0 25.0 26.0   BUN mg/dL 29* 31* 28*   CREATININE mg/dL 1.46* 1.58* 1.48*   GLUCOSE mg/dL 92 84 108*   CALCIUM mg/dL 8.6* 8.3* 8.2*     Estimated Creatinine Clearance: 96.1 mL/min (A) (by C-G formula based on SCr of 1.46 mg/dL (H)).     No results found for: BNP  No results found for: PHART    Microbiology Results Abnormal     None        Imaging Results (last 24 hours)     ** No results found for the last 24 hours. **        Results for orders placed during the hospital encounter of 05/29/18   Adult Transthoracic Echo Complete W/ Cont if Necessary Per Protocol    Narrative · The study is technically difficult for diagnosis.  · Left ventricular systolic function is mildly decreased. Estimated EF =   40%.  · The following left ventricular wall segments are hypokinetic: mid   anterior, apical anterior, apical inferior, apical septal, apex   hypokinetic and mid anteroseptal.        I have reviewed the medications.    Assessment / Plan     Hospital Problem List     * (Principal)CAD with previous history of stents. Most recent stent 3/16/18    Overview Addendum 6/5/2018  7:47 AM by ROBBIN Kunz     1.   NSTEMI 8/22/2016   2. Cardiac catheterization 8/22/2016: Single vessel- CAD with total ostial occlusion of LAD. PTCA/stent of LAD with 3.5 x 33 mm ALISHA reducing 100% stenosis to 0%. EF 40-45%   3. Echocardiogram 8/23/2016: EF 55%. Trace MR. Trace TR.  4. NSTEMI 09/2017: [facility] demonstrating normal EF, widely patent LAD stent, no  "obstructive disease   5. Recurrent NSTEMI 02/2018: treated medically due to recent LHC demonstrating no obstructive disease, felt to be related to small vessel disease vs coronary spasm   6. Echo 2/3/2018: EF 60%  7. NSTEMI 03/2018 (with transient 1mm FRITZ inferior leads prior to [facility] arrival)   · Cardiac catheterrization  3/16/18 Dr Munguia:  Anteroapical hypokinesia and left ventricular ejection fraction of 54%,  three-vessel nonobstructive coronary disease and a widely patent proximal LAD stent. Despite a \"borderline normal\" iFR, a \"culprit\" lesion in the ostium of his Ramus Intermedians artery as defined by an in lesion MLD of 1.3 mm and a large burden not only of plaque but of \"red\" thrombus status post PTCA and stenting with a 3 x 18 mm Xience Alpine stent.  40% ostial narrowing of a distal branch of the EDYTA with iFR of 0.90   8. NSTEMI, 5-29-18:  · C   severe 1 vessel coronary artery disease involving a 100% thrombotic ostial LAD stenosis.  The lesion is now status post aspiration thrombectomy and angioplasty.  There is at least a residual 30-40% ostial segment stenosis.  · There is a patent previously placed stent in the ostial ramus intermedius artery with at most a 20% ostial segment narrowing.    9. Coronary artery bypass graft ×2 with EVH.   saphenous vein graft to circumflex and left internal mammary artery to LAD, 6-4-18         Hypertension    Dyslipidemia    History of leukemia    Neuropathy due to chemotherapeutic drug. Wheelchair bound with chronic pain    Tobacco abuse    Obesity    NSTEMI 5/29/18 s/p thrombectomy and PTCA    KENNETH (acute kidney injury)    Wheelchair bound    S/P CABG x 2 on 6/4/18    Chronic narcotic use        Brief Hospital Course to date:  Paulie Jordan is a 38 y.o. male debilitated wheelchair-bound male with a history of CAD, HTN, hyperlipidemia, IBS, hypothyroidism and leukemia (initially diagnosed in 1998 with recurrence in 2000). He is s/p chemotherapy, radiation and bone " marrow transplant (2000). This left him with significant neuropathy and inability to ambulate. History of multiple NSTEMIs, most recently 3/2018 for which he underwent PCTA and stent and 5/25/2018. He was transferred to [facility], underwent LHC revealing occluded distal LAD and underwent thrombectomy and angioplasty. Because of residual disease, he underwent CABG x 2 on 6/4/2018.     Home MS Contin has been increased to 45mg PO BID. Despite this, he continues to ask for pain medications (PO or IV) frequently. He intermittently refuses to work with physical therapy. Did have significant residual atelectasis which has nearly resolved. Transferred to the floor 6/8 and hospital medicine is following for medical management.     Coronary artery disease s/p CABG x 2 on 6/4/18 by Dr. Leggett  - Post-operative management per CT surgery   - No further increases in narcotic pain medications. Unfortunately, cannot use NSAIDS due to his renal insufficiency. Explained to him that we will not be able to get him completely pain free and that our goal is to keep his pain manageable     KENNETH, Creatinine improved today, continue to encourage hydration     HTN, BP controlled  Hyperlipidemia, statin  Chronic systolic heart failure, cardiology following. Hypotension limits use of ACEI/ARB or Entresto    Tobacco abuse, continue to encourage cessation. Dip tobacco in his bed during exam today.   Obesity, complicated all aspects of care    Chemotherapy-induced neuropathy  Chronic pain with chronic narcotic use, he would benefit from a suboxone clinic   Wheelchair bound state   - Continue PT/OT, patient/family requesting rehab at discharge - notified CM who will discuss with family on Monday   - Chronic pain issues make his pain control more challenging - further discussion as above    DVT Prophylaxis: Heparin   CODE STATUS: Full Code    Disposition: I expect the patient to be discharged to rehab in a few days. CM to see today.     Electronically  signed by Krys Aparicio PA-C, 06/10/18, 11:00 AM.

## 2018-06-10 NOTE — PLAN OF CARE
Problem: Patient Care Overview  Goal: Plan of Care Review  Outcome: Ongoing (interventions implemented as appropriate)   06/10/18 1537   Coping/Psychosocial   Plan of Care Reviewed With patient   Plan of Care Review   Progress no change   OTHER   Outcome Summary pt still asking for additional pain meds. did get up to chair and sit. plan for rehab at d/c       Problem: Fall Risk (Adult)  Goal: Identify Related Risk Factors and Signs and Symptoms  Outcome: Outcome(s) achieved Date Met: 06/10/18    Goal: Absence of Fall  Outcome: Ongoing (interventions implemented as appropriate)      Problem: Skin Injury Risk (Adult)  Goal: Identify Related Risk Factors and Signs and Symptoms  Outcome: Outcome(s) achieved Date Met: 06/10/18    Goal: Skin Health and Integrity  Outcome: Ongoing (interventions implemented as appropriate)      Problem: Cardiac Surgery (Adult)  Goal: Signs and Symptoms of Listed Potential Problems Will be Absent, Minimized or Managed (Cardiac Surgery)  Outcome: Ongoing (interventions implemented as appropriate)

## 2018-06-10 NOTE — PROGRESS NOTES
CTS Progress Note      POD 6 s/p CABGX2  Chief complaint  Coronary disease    Subjective  In bed. Uncomfortable. Weak      Objective    Physical Exam:   Vital Signs   Temp:  [98.1 °F (36.7 °C)-99.3 °F (37.4 °C)] 99.3 °F (37.4 °C)  Heart Rate:  [69-78] 76  Resp:  [20] 20  BP: ()/(53-85) 126/85   GEN: NAD   CV: Regular rate and rhythm    RESP: Clear distant   EXT: Warm 2+ edema   Incision: Clean dry and intact skin staples present     Results       Results from last 7 days  Lab Units 06/10/18  0612  06/08/18  0518   WBC 10*3/mm3  --   --  8.49   HEMOGLOBIN g/dL 7.6*  < > 7.4*   HEMATOCRIT % 24.6*  < > 23.8*   PLATELETS 10*3/mm3  --   --  183   < > = values in this interval not displayed.    Results from last 7 days  Lab Units 06/10/18  0612   SODIUM mmol/L 136   POTASSIUM mmol/L 5.3   CHLORIDE mmol/L 105   CO2 mmol/L 23.0   BUN mg/dL 29*   CREATININE mg/dL 1.46*   GLUCOSE mg/dL 92   CALCIUM mg/dL 8.6*       Results from last 7 days  Lab Units 06/05/18  0342 06/04/18  1203   INR  1.02 1.05   APTT seconds  --  29.7         Assessment/Plan     Principal Problem:    CAD with previous history of stents. Most recent stent 3/16/18  Active Problems:    Hypertension    Dyslipidemia    History of leukemia    Neuropathy due to chemotherapeutic drug. Wheelchair bound with chronic pain    Tobacco abuse    Obesity    NSTEMI 5/29/18 s/p thrombectomy and PTCA    KENNETH (acute kidney injury)    Wheelchair bound    S/P CABG x 2 on 6/4/18    Chronic narcotic use        Plan   Continue to watch H&H low but stable  Watch creatinine  Increase activity level    ROBBIN Inman  06/10/18  7:48 AM

## 2018-06-11 NOTE — THERAPY EVALUATION
Acute Care - Occupational Therapy Initial Evaluation  [facility]     Patient Name: Paulie Jordan  : 1980  MRN: [id number]  Today's Date: 2018  Onset of Illness/Injury or Date of Surgery: 18  Date of Referral to OT: 06/10/18  Referring Physician: MD Oleksandr    Admit Date: 2018       ICD-10-CM ICD-9-CM   1. Coronary artery disease involving native coronary artery of native heart with angina pectoris I25.119 414.01     413.9   2. NSTEMI (non-ST elevated myocardial infarction) I21.4 410.70   3. Impaired functional mobility, balance, gait, and endurance Z74.09 V49.89   4. Impaired mobility and ADLs Z74.09 799.89     Patient Active Problem List   Diagnosis   • Hypertension   • CAD with previous history of stents. Most recent stent 3/16/18   • Dyslipidemia   • History of leukemia   • Neuropathy due to chemotherapeutic drug. 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     Past Surgical History:   Procedure Laterality Date   • BONE MARROW TRANSPLANT     • CARDIAC CATHETERIZATION N/A 2016    Procedure: Left Heart Cath;  Surgeon: Devorah Hightower MD;  Location:  [facility];  Service:    • CARDIAC CATHETERIZATION N/A 2017    Procedure: Left Heart Cath;  Surgeon: Devorah Hightower MD;  Location:  [facility];  Service:    • CARDIAC CATHETERIZATION N/A 3/16/2018    Procedure: Left Heart Cath;  Surgeon: Bryon Munguia MD;  Location:  [facility];  Service: Cardiology   • CARDIAC CATHETERIZATION N/A 2018    Procedure: Left Heart Cath;   Surgeon: Bryon Triana MD;  Location:  [email or web address] CATH INVASIVE LOCATION;  Service: Cardiovascular   • CORONARY ARTERY BYPASS GRAFT N/A 6/4/2018    Procedure: MEDIAN STERNOTOMY, CORONARY ARTERY BYPASS X  2 WITH INTERNAL MAMMARY ARTERY GRAFT, EVH OF THE RIGHT GREATER SAPHENOUS VEIN AND EXPLORATION OF THE LEFT LEG;  Surgeon: Ramesh Leggett MD;  Location:  [email or web address] OR;  Service: Cardiothoracic   • CORONARY STENT PLACEMENT            OT ASSESSMENT FLOWSHEET (last 72 hours)      Occupational Therapy Evaluation     Row Name 06/11/18 1300 06/11/18 1115                OT Evaluation Time/Intention    Subjective Information  -- complains of;weakness;fatigue;pain  -HK       Document Type  -- evaluation  -HK       Mode of Treatment  -- individual therapy;occupational therapy  -HK       Patient Effort  -- adequate  -HK       Symptoms Noted During/After Treatment  -- fatigue  -HK          General Information    Patient Profile Reviewed?  -- yes  -HK       Onset of Illness/Injury or Date of Surgery  -- 05/29/18  -HK       Referring Physician  -- MD Oleksandr  -HK       Patient Observations  -- alert;cooperative;agree to therapy  -HK       Patient/Family Observations  -- family at bedside  -HK       General Observations of Patient  -- Pt received sitting upright in chair with all pads underneath wet with urine.   -HK       Prior Level of Function  -- independent:;transfer;w/c or scooter;bed mobility;bathing;dressing  -HK       Equipment Currently Used at Home  -- wheelchair;wheelchair, motorized;rollator;cane, quad  -HK       Pertinent History of Current Functional Problem  -- Pt is a 38 YOM who presented to the ED on 5/29/18 with complaints of chest and BUE pain. Pt is s/p CABG x2 with EVH 6/4/18. Pt has [facility]HG of previous MI x2 and leukemia with secondary LE neuropathy.   -HK       Existing Precautions/Restrictions  -- fall;sternal;cardiac  -HK       Risks Reviewed  -- patient and family:;LOB;nausea/vomiting;dizziness;increased  discomfort;change in vital signs;lines disloged;increased drainage  -HK       Benefits Reviewed  -- patient and family:;increase independence;improve function;increase strength;increase balance;decrease pain;decrease risk of DVT;improve skin integrity;increase knowledge  -HK       Barriers to Rehab  -- medically complex;previous functional deficit  -HK          Relationship/Environment    Lives With  -- alone  -HK          Resource/Environmental Concerns    Current Living Arrangements  -- home/apartment/condo  -          Home Main Entrance    Number of Stairs, Main Entrance  -- two  -HK       Stair Railings, Main Entrance  -- railings on both sides of stairs  -HK          Cognitive Assessment/Interventions    Additional Documentation  -- Cognitive Assessment/Intervention (Group)  -HK          Cognitive Assessment/Intervention- PT/OT    Affect/Mental Status (Cognitive)  -- [facility]  -HK       Orientation Status (Cognition)  -- oriented x 4  -HK       Follows Commands (Cognition)  -- [facility]  -HK       Cognitive Function (Cognitive)  -- safety deficit  -HK       Safety Deficit (Cognitive)  -- safety precautions follow-through/compliance;safety precautions awareness;insight into deficits/self awareness;awareness of need for assistance  -HK       Personal Safety Interventions  -- fall prevention program maintained;gait belt;nonskid shoes/slippers when out of bed  -HK          Bed Mobility Assessment/Treatment    Bed Mobility Assessment/Treatment  -- rolling left;rolling right  -HK       Rolling Left [place] (Bed Mobility)  -- maximum assist (25% patient effort);2 person assist;verbal cues  -HK       Rolling Right [place] (Bed Mobility)  -- maximum assist (25% patient effort);2 person assist;verbal cues  -HK       Comment (Bed Mobility)  -- Pt received UIC and transferred to bed via mechanical lift. Pt rolled L to R in chair for sling placement and removal of saturated ijeoma pads.    -HK          Functional Mobility     Functional Mobility- Ind. Level  -- unable to perform  -HK       Functional Mobility- Comment  -- pt declined any mobility or transfers complaining of fatigue after PT had come in.   -HK          Transfer Assessment/Treatment    Transfer Assessment/Treatment  -- bed-chair transfer  -HK       Comment (Transfers)  -- pt declined any mobility or transfers complaining of fatigue after PT had come in. Pt transferred from chair to bed. Pt rolled L to R in chair to place sling due to pt refusal to attempt transfer. Pt educated on sternal precautions and how to maintain.    -HK       Bed-Chair [place] (Transfers)  -- dependent (less than 25% patient effort);2 person assist  -HK       Assistive Device (Bed-Chair Transfers)  -- mechanical lift/aid  -          ADL Assessment/Intervention    BADL Assessment/Intervention  -- bathing;upper body dressing;toileting  -          Bathing Assessment/Intervention    Bathing [place] Level  -- supervision;chest/trunk;upper extremities;perineal area;maximum assist (25% patient effort);distal lower extremities/feet;proximal lower extremities;other (see comments)   back   -HK       Bathing Position  -- unsupported sitting  -       Comment (Bathing)  -- Pt washed upper body and sofi areas with supervision. Pt required maxA to wash legs and back.   -          Upper Body Dressing Assessment/Training    Upper Body Dressing Gr[place] Level  -- doff;don;other (see comments);maximum assist (25% patient effort)   gown  -       Upper Body Dressing Position  -- unsupported sitting  -       Comment (Upper Body Dressing)  -- OT assisted pt to dof/don gown  -          Toileting Assessment/Training    Comment (Toileting)  -- ijeoma pads saturated with urine on OT entry and pt did not know. Pt is incontinent with urine.   -HK          BADL Safety/Performance    Impairments, BADL Safety/Performance  -- balance;strength;pain;trunk/postural control;endurance/activity  tolerance;range of motion  -          General ROM    RT Upper Ext  -- Rt Shoulder Flexion  -HK       LT Upper Ext  -- Lt Shoulder Flexion  -HK          Right Upper Ext    Rt Upper Extremity Comments   -- not assessed due to sternal precautions. Pt reports arms are very weak.   -HK          Left Upper Ext    Lt Upper Extremity Comments   -- not assessed due to sternal precautions. Pt reports arms are very weak.   -HK          MMT (Manual Muscle Testing)    Additional Documentation  -- General Assessment (Manual Muscle Testing) (Group)  -HK          General Assessment (Manual Muscle Testing)    General Manual Muscle Testing (MMT) Assessment  -- upper extremity strength deficits identified  -HK       Comment, General Manual Muscle Testing (MMT) Assessment  -- not assessed due to sternal precautions. Pt reports arms are very weak.   -HK          Upper Extremity (Manual Muscle Testing)    Comment, MMT: Upper Extremity  -- not assessed due to sternal precautions. Pt reports arms are very weak.   -HK          Motor Assessment/Interventions    Additional Documentation  -- Balance (Group)  -HK          Balance    Balance  -- static sitting balance;dynamic sitting balance  -          Static Sitting Balance    Level of [place] (Unsupported Sitting, Static Balance)  -- independent  -       Sitting Position (Unsupported Sitting, Static Balance)  -- sitting in chair  -HK       Time Able to Maintain Position (Unsupported Sitting, Static Balance)  -- more than 5 minutes  -HK          Dynamic Sitting Balance    Level of [place], Reaches Outside Midline (Sitting, Dynamic Balance)  -- supervision  -HK       Sitting Position, Reaches Outside Midline (Sitting, Dynamic Balance)  -- sitting in chair  -HK       Comment, Reaches Outside Midline (Sitting, Dynamic Balance)  -- bathing  -          Sensory Assessment/Intervention    Sensory General Assessment  -- no sensation deficits identified  -HK          Positioning and  Restraints    Pre-Treatment Position  -- sitting in chair/recliner  -HK       Post Treatment Position  -- bed  -HK       In Bed  -- notified nsg;supine;call light within reach;encouraged to call for assist;exit alarm on;with family/caregiver   OT completed meal set up  -HK          Pain Assessment    Additional Documentation  -- Pain Scale: FACES Pre/Post-Treatment (Group)  -HK          Pain Scale: FACES Pre/Post-Treatment    Pain: FACES Scale, Pretreatment  -- 0-->no hurt  -HK       Pain: FACES Scale, Post-Treatment  -- 0-->no hurt  -HK          Wound 06/04/18 0826 chest incision    Wound - Properties Group Date first assessed: 06/04/18  -SH Time first assessed: 0826  -SH Location: chest  -SH Type: incision  -SH       Wound 06/04/18 0917 Right leg incision    Wound - Properties Group Date first assessed: 06/04/18  -SH Time first assessed: 0917  -SH Side: Right  -SH Location: leg  -SH Type: incision  -SH       Wound 06/04/18 1454 Left surgical    Wound - Properties Group Date first assessed: 06/04/18  -JOHNNY Time first assessed: 1454  -JOHNNY Side: Left  -JOHNNY Type: surgical  -JOHNNY       Plan of Care Review    Plan of Care Reviewed With  -- patient;family  -HK          Clinical Impression (OT)    Date of Referral to OT 06/10/18  -HK  --       OT Diagnosis Decreased independence with ADLS and Mobility   -HK  --       Patient/Family Goals Statement (OT Eval) Pt would like to improve and go to rehab prior to returning home  -HK  --       Criteria for Skilled Therapeutic Interventions Met (OT Eval) yes;treatment indicated  -HK  --       Rehab Potential (OT Eval) good, to achieve stated therapy goals  -HK  --       Therapy Frequency (OT Eval) daily  -HK  --       Care Plan Review (OT) evaluation/treatment results reviewed;care plan/treatment goals reviewed;patient/other agree to care plan  -HK  --       Care Plan Review, Other Participant (OT Eval) family  -HK  --       Anticipated Discharge Disposition (OT) inpatient  rehabilitation facility  -HK  --          Vital Signs    Pre Systolic BP Rehab --   RN cleared for tx.   -HK  --       Pre Patient Position Sitting  -HK  --       Intra Patient Position Supine  -HK  --       Post Patient Position Supine  -HK  --          OT Goals    Bed Mobility Goal Selection (OT) bed mobility, OT goal 1  -HK  --       Transfer Goal Selection (OT) transfer, OT goal 1  -HK  --       Dressing Goal Selection (OT) dressing, OT goal 1  -HK  --       Toileting Goal Selection (OT) toileting, OT goal 1  -HK  --          Bed Mobility Goal 1 (OT)    Activity/Assistive Device (Bed Mobility Goal 1, OT) scooting;sit to supine/supine to sit  -HK  --       [place] Level/Cues Needed (Bed Mobility Goal 1, OT) moderate assist (50-74% patient effort);verbal cues required  -HK  --       Time Frame (Bed Mobility Goal 1, OT) 5 days  -HK  --       Progress/Outcomes (Bed Mobility Goal 1, OT) goal ongoing  -HK  --          Transfer Goal 1 (OT)    Activity/Assistive Device (Transfer Goal 1, OT) bed-to-chair/chair-to-bed  -HK  --       [place] Level/Cues Needed (Transfer Goal 1, OT) moderate assist (50-74% patient effort);verbal cues required  -HK  --       Time Frame (Transfer Goal 1, OT) 5 days  -HK  --       Progress/Outcome (Transfer Goal 1, OT) goal ongoing  -HK  --          Dressing Goal 1 (OT)    Activity/Assistive Device (Dressing Goal 1, OT) lower body dressing  -HK  --       [place]/Cues Needed (Dressing Goal 1, OT) moderate assist (50-74% patient effort);verbal cues required  -HK  --       Time Frame (Dressing Goal 1, OT) 5 days  -HK  --       Progress/Outcome (Dressing Goal 1, OT) goal ongoing  -HK  --          Toileting Goal 1 (OT)    Activity/Device (Toileting Goal 1, OT) commode, bedside without drop arms;toileting skills, all  -HK  --       [place] Level/Cues Needed (Toileting Goal 1, OT) moderate assist (50-74% patient effort);verbal cues required  -HK  --       Time Frame (Toileting Goal  1, OT) 5 days  -HK  --       Progress/Outcome (Toileting Goal 1, OT) goal ongoing  -  --          Living Environment    Home Accessibility  -- tub/shower is not walk in;stairs to enter home  -         User Key  (r) = Recorded By, (t) = Taken By, (c) = Cosigned By    Initials Name Effective Dates     Sherri L Haase, RN 06/16/16 -     JOHNNY Bryson RN 06/16/16 -     HK Darby Whipple OT 03/07/18 -            Occupational Therapy Education     Title: PT OT SLP Therapies (Active)     Topic: Occupational Therapy (Active)     Point: ADL training (Done)     Description: Instruct learner(s) on proper safety adaptation and remediation techniques during self care or transfers.   Instruct in proper use of assistive devices.   Learning Progress Summary     Learner Status Readiness Method Response Comment Documented by    Patient Done Acceptance E VU pt educated on ADL retraining with bathing, safety precautions, and appropriate body mechanics.  06/11/18 1354          Point: Precautions (Done)     Description: Instruct learner(s) on prescribed precautions during self-care and functional transfers.   Learning Progress Summary     Learner Status Readiness Method Response Comment Documented by    Patient Done Acceptance E VU pt educated on ADL retraining with bathing, safety precautions, and appropriate body mechanics. HK 06/11/18 1354          Point: Body mechanics (Done)     Description: Instruct learner(s) on proper positioning and spine alignment during self-care, functional mobility activities and/or exercises.   Learning Progress Summary     Learner Status Readiness Method Response Comment Documented by    Patient Done Acceptance E VU pt educated on ADL retraining with bathing, safety precautions, and appropriate body mechanics.  06/11/18 1354                      User Key     Initials Effective Dates Name Provider Type Discipline     03/07/18 -  Darby Whipple OT Occupational Therapist OT                   OT Recommendation and Plan  Outcome Summary/Treatment Plan (OT)  Anticipated Discharge Disposition (OT): inpatient rehabilitation facility  Therapy Frequency (OT Eval): daily  Plan of Care Review  Plan of Care Reviewed With: patient  Plan of Care Reviewed With: patient  Outcome Summary: OT eval complete. Pt declined to compelte sit to stand or chair to bed transfer without lift. Pt completed bath sitting in chair with maxA to wash back and legs. Pt rolled L to R with maxA to place sling and transferred back to bed via mechanical lift. Recommend SNF rehab.           Outcome Measures     Row Name 06/11/18 1115 06/11/18 1012          How much help from another person do you currently need...    Turning from your back to your side while in flat bed without using bedrails?  -- 2  -LM     Moving from lying on back to sitting on the side of a flat bed without bedrails?  -- 1  -LM     Moving to and from a bed to a chair (including a wheelchair)?  -- 2  -LM     Standing up from a chair using your arms (e.g., wheelchair, bedside chair)?  -- 2  -LM     Climbing 3-5 steps with a railing?  -- 1  -LM     To walk in hospital room?  -- 1  -LM     AM-PAC 6 Clicks Score  -- 9  -LM        How much help from another is currently needed...    Putting on and taking off regular lower body clothing? 1  -HK  --     Bathing (including washing, rinsing, and drying) 2  -HK  --     Toileting (which includes using toilet bed pan or urinal) 1  -HK  --     Putting on and taking off regular upper body clothing 2  -HK  --     Taking care of personal grooming (such as brushing teeth) 3  -HK  --     Eating meals 4  -HK  --     Score 13  -HK  --        Functional Assessment    Outcome Measure Options AM-PAC 6 Clicks Daily Activity (OT)  -HK AM-PAC 6 Clicks Basic Mobility (PT)  -LM       User Key  (r) = Recorded By, (t) = Taken By, (c) = Cosigned By    Initials Name Provider Type    AMIRA Chang, PT Physical Therapist    HK Darby Whipple,  OT Occupational Therapist          Time Calculation:   OT Start Time: 1115    Therapy Charges for Today     Code Description Service Date Service Provider Modifiers Qty    [id number]  OT EVAL MOD COMPLEXITY 4 6/11/2018 Darby Whipple, OT GO 1    [id number]  OT THER SUPP EA 15 MIN 6/11/2018 Darby Whipple OT GO 2               Darby Whipple OT  6/11/2018

## 2018-06-11 NOTE — PLAN OF CARE
Problem: Patient Care Overview  Goal: Plan of Care Review  Outcome: Ongoing (interventions implemented as appropriate)   06/11/18 1052   Coping/Psychosocial   Plan of Care Reviewed With patient;father   OTHER   Outcome Summary Pt transferred sit<>stand x3 reps; gradually improving with each repetition to reach partial stand on 3rd rep. Pt required maxAx2 and blocking of knees to complete stand. Will continue to progress with mobility as able.

## 2018-06-11 NOTE — NURSING NOTE
"Patient calls out frequently asking for pain meds. Keep reminding him that his meds are scheduled. He asks other staff members to change the times of his medication, \"so I can get them earlier.\" Have explained to him medication can not be changed daily with different times given. He refuses to move about, states \"I cant move my arms and I am tired of telling you people this. My legs dont work because of Leukemia, so I cant stand and my arms dont move because I will tear my chest opened,\" Tried to explain to him that he can move his arms, but he gets upset and keeps saying \"you people dont know a dam thing here.\" Tried to explain to him the ways of getting him up to the chair and back to bed, but he refuses to listen. I have saw the patient raise up out of bed and reach across the table to get his snacks, but then when he sees someone at the door, he lays back down and ask for help \"getting his food.\" Tonight he has sat in the chair and refuses to get back in bed, says \"I cant stand so I dont know how you people are going to move me, I cant move my legs or arms.\" Tried again to explain getting him up with a lift, and also two other men here to help. He still refuses. He refuses labs to be drawn, wants to argue with lab staff about \"getting blood out of his IV.\" It has been explained to him many times that blood cant be drawn from the IV. He is very hateful with staff trying to help him. He is very hateful and tries to talk over staff that is trying to explain anything that will help him.   "

## 2018-06-11 NOTE — PROGRESS NOTES
"                  Clinical Nutrition     Nutrition Assessment  Reason for Visit:   Follow-up protocol      Patient Name: Paulie Jordan  YOB: 1980  MRN: [id number]  Date of Encounter: 06/11/18 1:56 PM  Admission date: 5/29/2018      Nutrition Assessment   Assessment       Hospital Problem List  Principal Problem:    CAD with previous history of stents. Most recent stent 3/16/18  Active Problems:    Hypertension    Dyslipidemia    History of leukemia    Neuropathy due to chemotherapeutic drug. Wheelchair bound with chronic pain    Tobacco abuse    Obesity    NSTEMI 5/29/18 s/p thrombectomy and PTCA    KENNETH (acute kidney injury)    Wheelchair bound    S/P CABG x 2 on 6/4/18    Chronic narcotic use      PMH: He  has a past medical history of Chronic pain; Gout; Hypercholesterolemia; Hypertension; Leukemia; Leukemia; MI, old; and Nerve damage.   PSxH: He  has a past surgical history that includes Bone marrow transplant; Cardiac catheterization (N/A, 8/22/2016); Coronary stent placement; Cardiac catheterization (N/A, 9/23/2017); Cardiac catheterization (N/A, 3/16/2018); Cardiac catheterization (N/A, 5/29/2018); and Coronary artery bypass graft (N/A, 6/4/2018).     Reported/Observed/Food/Nutrition Related History:     Pt reported appetite to be just ok and does not seem to be improving. Pt reported drinking some of the nutritional supplements but would really prefer strawberry flavor. Pt reported  taking great care of him and bringing him food off of the alternate menu.       Anthropometrics     Height: 182.9 cm (72.01\")  Last filed wt: Weight:  (patient refused weighing) (06/11/18 2224)  Weight Method: Stated    BMI: BMI (Calculated): 40.4  Obese Class III extreme obesity: > or equal to 40kg/m2    Ideal Body Weight (IBW) (kg): 82.09      Labs reviewed     )  Results from last 7 days  Lab Units 06/10/18  0612   SODIUM mmol/L 136   POTASSIUM mmol/L 5.3   CHLORIDE mmol/L 105   CO2 mmol/L 23.0 "   BUN mg/dL 29*   CREATININE mg/dL 1.46*   CALCIUM mg/dL 8.6*   GLUCOSE mg/dL 92         Results from last 7 days  Lab Units 06/07/18  1610 06/07/18  1131 06/07/18  1049 06/07/18  0716 06/06/18  2126 06/06/18  1601   GLUCOSE mg/dL 122 83 64* 84 114 113       Lab Results  Lab Value Date/Time   HGBA1C 5.70 (H) 05/29/2018 0434   HGBA1C 6.10 (H) 03/16/2018 0345       Intake/Ouptut 24 hrs (7:00AM - 6:59 AM)     Intake & Output (last day)       06/10 0701 - 06/11 0700 06/11 0701 - 06/12 0700    P.O. 1520 2500    Total Intake(mL/kg) 1520 (11.3) 2500 (18.5)    Urine (mL/kg/hr) 600 (0.2) 300 (0.3)    Total Output 600 300    Net +920 +2200          Unmeasured Urine Occurrence  1 x          Current Nutrition Prescription     PO: Diet Regular; Cardiac    Active Supplement Orders   Dietary Nutrition Supplements Boost HP      Intake:    Per charting pt consuming 30% of 5 meals       Nutrition Diagnosis       Problem Inadequate oral intake   Etiology Clinical condition    Signs/Symptoms PO intake 30% of 5 meals       Nutrition Intervention   1.  Follow treatment progress, Care plan reviewed, Advise alternate selection, Advised available snacks, Menu provided, Adjusted supplement, Encourage intake    Will change supplement to strawberry premier protein BID per pt flavor request.     Goal:   General: Nutrition support treatment  PO: Increase intake      Monitoring/Evaluation:   Per protocol, PO intake, Supplement intake      Will Continue to follow per protocol      Madelyn ARMANDO Waits  Time Spent: 20 minutes

## 2018-06-11 NOTE — PROGRESS NOTES
"[facility] at [facility]  IP Progress Note      Chief Complaint: Follow-up of CAD/non-STEMI/CM    Subjective:  Continues to complain of sternal pain and asks for more pain medications.  No shortness of breath.  Has not worked much with physical therapy.  Objective:  Blood pressure 111/73, pulse 80, temperature 98.5 °F (36.9 °C), temperature source Oral, resp. rate 18, height 182.9 cm (72.01\"), weight 135 kg (297 lb 9.9 oz), SpO2 (!) 83 %.     Intake/Output Summary (Last 24 hours) at 06/11/18 0724  Last data filed at 06/11/18 0559   Gross per 24 hour   Intake             1320 ml   Output              600 ml   Net              720 ml     Physical Exam:  General: No acute distress.   Neck: no JVD.  Chest:No respiratory distress, breath sounds are normal. No wheezes,  rhonchi or rales.  Cardiovascular: Normal S1 and S2, no murmer, gallop or rub.    Extremities: No edema.    Results Review:     I reviewed the patient's new clinical results.      Results from last 7 days  Lab Units 06/10/18  0612  06/08/18  0518   WBC 10*3/mm3  --   --  8.49   HEMOGLOBIN g/dL 7.6*  < > 7.4*   HEMATOCRIT % 24.6*  < > 23.8*   PLATELETS 10*3/mm3  --   --  183   < > = values in this interval not displayed.    Results from last 7 days  Lab Units 06/10/18  0612   SODIUM mmol/L 136   POTASSIUM mmol/L 5.3   CHLORIDE mmol/L 105   CO2 mmol/L 23.0   BUN mg/dL 29*   CREATININE mg/dL 1.46*   CALCIUM mg/dL 8.6*   GLUCOSE mg/dL 92       Results from last 7 days  Lab Units 06/10/18  0612   SODIUM mmol/L 136   POTASSIUM mmol/L 5.3   CHLORIDE mmol/L 105   CO2 mmol/L 23.0   BUN mg/dL 29*   CREATININE mg/dL 1.46*   GLUCOSE mg/dL 92   CALCIUM mg/dL 8.6*       Results from last 7 days  Lab Units 06/05/18  0342 06/04/18  1203   INR  1.02 1.05     Lab Results   Component Value Date    CKTOTAL 132 03/16/2018    CKMB 11.03 (C) 03/16/2018    CKMBINDEX 8.4 (H) 03/16/2018    TROPONINI 18.261 (C) 05/29/2018                   Tele: Sinus " [place]          Assessment and Plan:   1. CAD with recurrent NSTEMI  - Echo EF 40%  - s/p 2 vessel CABG today 6/4/18  per Dr. Leggett   - hemodynamically stable, currently NSR, off all vasopressors.  - Continue current management.  2. HLD  3. Tobacco abuse, Cessation discussed.  4. KENNETH, stable, continue to monitor.  5. Chronic pain, acute exacerbation post CABG.  Further pain management per ICU team/CT surgery     Blood pressure too low for ACE ihibitor/ARB/Entresto.  Started on low-dose metoprolol, tolerating well..  Needs comprehensive physical therapy, probable discharge to a rehabilitation facility soon, okay any time from cardiology standpoint.    Devorah Hightower MD, FACC, [facility]

## 2018-06-11 NOTE — THERAPY TREATMENT NOTE
Acute Care - Physical Therapy Treatment Note  [facility]     Patient Name: Paulie Jordan  : 1980  MRN: [id number]  Today's Date: 2018  Onset of Illness/Injury or Date of Surgery: 18  Date of Referral to PT: 18  Referring Physician: ROBBIN Escalante    Admit Date: 2018    Visit Dx:    ICD-10-CM ICD-9-CM   1. Coronary artery disease involving native coronary artery of native heart with angina pectoris I25.119 414.01     413.9   2. NSTEMI (non-ST elevated myocardial infarction) I21.4 410.70   3. Impaired functional mobility, balance, gait, and endurance Z74.09 V49.89     Patient Active Problem List   Diagnosis   • Hypertension   • CAD with previous history of stents. Most recent stent 3/16/18   • Dyslipidemia   • History of leukemia   • Neuropathy due to chemotherapeutic drug. Wheelchair bound with chronic pain   • Tobacco abuse   • Obesity   • NSTEMI 18 s/p thrombectomy and PTCA   • KENNETH (acute kidney injury)   • Wheelchair bound   • S/P CABG x 2 on 18   • Chronic narcotic use       Therapy Treatment          Rehabilitation Treatment Summary     Row Name 18 1012             Treatment Time/Intention    Discipline physical therapist  -LM      Document Type therapy note (daily note)  -LM      Subjective Information complains of;fatigue;pain;weakness  -LM      Mode of Treatment physical therapy;individual therapy  -LM      Patient/Family Observations Father present.  -LM      Care Plan Review patient/other agree to care plan  -LM      Therapy Frequency (PT Clinical Impression) daily  -LM      Patient Effort good  -LM      Existing Precautions/Restrictions fall;sternal;cardiac  -LM      Recorded by [LM] Darlene Chang, PT 18 1053      Row Name 18 1012             Vital Signs    Pre SpO2 (%) 100  -LM      O2 Delivery Pre Treatment room air  -LM      O2 Delivery Intra Treatment room air  -LM      Post SpO2 (%) 98  -LM      O2 Delivery Post Treatment room air  -LM       Pre Patient Position Sitting  -LM      Intra Patient Position Standing  -LM      Post Patient Position Sitting  -LM      Recorded by [LM] Darlene Chang, PT 06/11/18 1053      Row Name 06/11/18 1012             Cognitive Assessment/Intervention    Additional Documentation Cognitive Assessment/Intervention (Group)  -LM      Recorded by [LM] Darlene Chang, PT 06/11/18 1053      Row Name 06/11/18 1012             Cognitive Assessment/Intervention- PT/OT    Affect/Mental Status (Cognitive) WFL  -LM      Orientation Status (Cognition) oriented x 4  -LM      Follows Commands (Cognition) over 90% accuracy;follows multi-step commands  -LM      Safety Deficit (Cognitive) mild deficit  -LM      Recorded by [LM] Darlene Chang, PT 06/11/18 1053      Row Name 06/11/18 1012             Safety Issues, Functional Mobility    Safety Issues Affecting Function (Mobility) awareness of need for assistance;safety precaution awareness;safety precautions follow-through/compliance  -LM      Impairments Affecting Function (Mobility) balance;coordination;endurance/activity tolerance;pain;shortness of breath;strength  -LM      Recorded by [LM] Darlene Chang, PT 06/11/18 1053      Row Name 06/11/18 1012             Bed Mobility Assessment/Treatment    Comment (Bed Mobility) Pt received and left UIC  -LM      Recorded by [LM] Darlene Chang, PT 06/11/18 1053      Row Name 06/11/18 1012             Transfer Assessment/Treatment    Transfer Assessment/Treatment sit-stand transfer;stand-sit transfer  -LM      Comment (Transfers) STSx3. With first rep, unable to clear bottom from chair. Second rep, able to minimally clear bottom from surface. With third rep, improved to partial stand. Required blocking of bilateral feet and knees during stand to prevent buckling and sliding. Pt limited by weakness and fatigue.  -LM      Sit-Stand [place] (Transfers) maximum assist (25% patient effort);2 person assist;verbal cues  -LM      Stand-Sit  [place] (Transfers) maximum assist (25% patient effort);2 person assist;verbal cues  -LM      Recorded by [LM] Darlene Chang, PT 06/11/18 1053      Row Name 06/11/18 1012             Sit-Stand Transfer    Assistive Device (Sit-Stand Transfers) other (see comments)   none  -LM      Recorded by [LM] Darlene TRIPP Charlene, PT 06/11/18 1053      Row Name 06/11/18 1012             Stand-Sit Transfer    Assistive Device (Stand-Sit Transfers) other (see comments)   none  -LM      Recorded by [LM] Darlene K Charlene, PT 06/11/18 1053      Row Name 06/11/18 1012             Gait/Stairs Assessment/Training    [place] Level (Gait) unable to assess  -LM      Comment (Gait/Stairs) Pt non-ambulatory at baseline.  -LM      Recorded by [LM] Darlene TRIPP Charlene, PT 06/11/18 1053      Row Name 06/11/18 1012             Therapeutic Exercise    Lower Extremity (Therapeutic Exercise) gluteal sets;SLR (straight leg raise), bilateral;quad sets, bilateral  -LM      Lower Extremity Range of Motion (Therapeutic Exercise) ankle dorsiflexion/plantar flexion, bilateral  -LM      Position (Therapeutic Exercise) seated  -LM      Sets/Reps (Therapeutic Exercise) x10 reps each  -LM      Comment (Therapeutic Exercise) Samantha ankle pumps (bilateral foot drop at baseline)  -LM      Recorded by [LM] Darlene TRIPP Charlene, PT 06/11/18 1053      Row Name 06/11/18 1012             Positioning and Restraints    Pre-Treatment Position sitting in chair/recliner  -LM      Post Treatment Position chair  -LM      In Chair notified nsg;reclined;sitting;call light within reach;encouraged to call for assist;with family/caregiver;legs elevated  -LM      Recorded by [LM] Darlene K Charlene, PT 06/11/18 1053      Row Name 06/11/18 1012             Pain Assessment    Additional Documentation Pain Scale: FACES Pre/Post-Treatment (Group)  -LM      Recorded by [LM] Darlene QASIM Chang, PT 06/11/18 1053      Row Name 06/11/18 1012             Pain Scale: Numbers Pre/Post-Treatment     Pain Location - Orientation generalized  -LM      Pain Location chest   incision  -LM      Recorded by [LM] Darlene Chang, PT 06/11/18 1053      Row Name 06/11/18 1012             Pain Scale: FACES Pre/Post-Treatment    Pain: FACES Scale, Pretreatment 4-->hurts little more  -LM      Pain: FACES Scale, Post-Treatment 4-->hurts little more  -LM      Recorded by [LM] Darlene Chang, PT 06/11/18 1053      Row Name                Wound 06/04/18 0826 chest incision    Wound - Properties Group Date first assessed: 06/04/18 [SH] Time first assessed: 0826 [SH] Location: chest [SH] Type: incision [SH] Recorded by:  [SH] Sherri L Haase, RN 06/04/18 0826    Row Name                Wound 06/04/18 0917 Right leg incision    Wound - Properties Group Date first assessed: 06/04/18 [SH] Time first assessed: 0917 [SH] Side: Right [SH] Location: leg [SH] Type: incision [SH] Recorded by:  [SH] Sherri L Haase, RN 06/04/18 0917    Row Name                Wound 06/04/18 1454 Left surgical    Wound - Properties Group Date first assessed: 06/04/18 [JOHNNY] Time first assessed: 1454 [JOHNNY] Side: Left [JOHNNY] Type: surgical [JOHNNY] Recorded by:  [JOHNNY] Rajiv Bryson RN 06/04/18 1454    Row Name 06/11/18 1012             Plan of Care Review    Plan of Care Reviewed With patient;father  -LM      Recorded by [LM] Darlene Chang, PT 06/11/18 1053      Row Name 06/11/18 1012             Outcome Summary/Treatment Plan (PT)    Daily Summary of Progress (PT) progress toward functional goals is good  -LM      Recorded by [LM] Darlene Chang, PT 06/11/18 1053        User Key  (r) = Recorded By, (t) = Taken By, (c) = Cosigned By    Initials Name Effective Dates Discipline     Sherri L Haase, RN 06/16/16 -  Nurse    JOHNNY Bryson RN 06/16/16 -  Nurse    AMIRA Chang, PT 04/03/18 -  PT          Wound 06/04/18 0826 chest incision (Active)   Dressing Appearance open to air 6/10/2018  8:00 PM   Closure Liquid skin adhesive 6/10/2018  8:00 PM    Periwound intact;dry 6/10/2018  8:00 PM   Periwound Temperature warm 6/10/2018  8:00 PM   Drainage Amount none 6/10/2018  8:00 PM       Wound 06/04/18 0917 Right leg incision (Active)   Dressing Appearance open to air 6/10/2018  8:00 PM   Closure Staples 6/10/2018  8:00 PM   Periwound dry;intact 6/10/2018  8:00 PM   Drainage Amount none 6/10/2018  8:00 PM       Wound 06/04/18 1454 Left surgical (Active)   Dressing Appearance open to air 6/10/2018  8:00 PM   Closure Staples 6/10/2018  8:00 PM   Drainage Amount none 6/10/2018  8:00 PM             Physical Therapy Education     Title: PT OT SLP Therapies (Active)     Topic: Physical Therapy (Active)     Point: Mobility training (Active)    Learning Progress Summary     Learner Status Readiness Method Response Comment Documented by    Patient Done Acceptance E,D VU,NR Reviewed sternal precautions, benefits of activity, HEP.  06/11/18 1053     Active Acceptance E,D NR   06/08/18 1040     Active Acceptance E NR   06/07/18 1128     Active Acceptance E NR   06/06/18 1138     Active Acceptance E,D NR   06/05/18 1013    Family Done Acceptance E,D VU,NR Reviewed sternal precautions, benefits of activity, HEP.  06/11/18 1053    Father Active Acceptance E,D NR   06/05/18 1013          Point: Home exercise program (Active)    Learning Progress Summary     Learner Status Readiness Method Response Comment Documented by    Patient Done Acceptance E,D VU,NR Reviewed sternal precautions, benefits of activity, HEP.  06/11/18 1053     Active Acceptance E,D NR   06/08/18 1040     Active Acceptance E NR  KR 06/07/18 1128     Active Acceptance E NR   06/06/18 1138     Active Acceptance E,D NR   06/05/18 1013    Family Done Acceptance E,D VU,NR Reviewed sternal precautions, benefits of activity, HEP.  06/11/18 1053    Father Active Acceptance E,D NR   06/05/18 1013          Point: Body mechanics (Active)    Learning Progress Summary     Learner Status Readiness  Method Response Comment Documented by    Patient Done Acceptance E,D VU,NR Reviewed sternal precautions, benefits of activity, HEP.  06/11/18 1053     Active Acceptance E,D NR   06/08/18 1040     Active Acceptance E NR   06/07/18 1128     Active Acceptance E NR   06/06/18 1138     Active Acceptance E,D NR   06/05/18 1013    Family Done Acceptance E,D VU,NR Reviewed sternal precautions, benefits of activity, HEP.  06/11/18 1053    Father Active Acceptance E,D NR   06/05/18 1013          Point: Precautions (Active)    Learning Progress Summary     Learner Status Readiness Method Response Comment Documented by    Patient Done Acceptance E,D VU,NR Reviewed sternal precautions, benefits of activity, HEP.  06/11/18 1053     Active Acceptance E,D NR   06/08/18 1040     Active Acceptance E NR   06/07/18 1128     Active Acceptance E NR   06/06/18 1138     Active Acceptance E,D NR   06/05/18 1013    Family Done Acceptance E,D VU,NR Reviewed sternal precautions, benefits of activity, HEP.  06/11/18 1053    Father Active Acceptance E,D NR   06/05/18 1013                      User Key     Initials Effective Dates Name Provider Type Discipline     04/03/18 -  Yennifer Patel, PT Physical Therapist PT     04/03/18 -  Darlene Chnag, PT Physical Therapist PT     05/15/18 -  Ki Workman, PT Student PT Student PT     06/07/18 -  Heaven Ayala, PT Student PT Student PT                    PT Recommendation and Plan  Therapy Frequency (PT Clinical Impression): daily  Outcome Summary/Treatment Plan (PT)  Daily Summary of Progress (PT): progress toward functional goals is good  Plan of Care Reviewed With: patient, father  Outcome Summary: Pt transferred sit<>stand x3 reps; gradually improving with each repetition to reach partial stand on 3rd rep. Pt required maxAx2 and blocking of knees to complete stand. Will continue to progress with mobility as able.           Outcome Measures     Row Name  06/11/18 1012             How much help from another person do you currently need...    Turning from your back to your side while in flat bed without using bedrails? 2  -LM      Moving from lying on back to sitting on the side of a flat bed without bedrails? 1  -LM      Moving to and from a bed to a chair (including a wheelchair)? 2  -LM      Standing up from a chair using your arms (e.g., wheelchair, bedside chair)? 2  -LM      Climbing 3-5 steps with a railing? 1  -LM      To walk in hospital room? 1  -LM      AM-PAC 6 Clicks Score 9  -LM         Functional Assessment    Outcome Measure Options AM-PAC 6 Clicks Basic Mobility (PT)  -LM        User Key  (r) = Recorded By, (t) = Taken By, (c) = Cosigned By    Initials Name Provider Type    AMIRA Chang PT Physical Therapist           Time Calculation:         PT Charges     Row Name 06/11/18 1054             Time Calculation    Start Time 1012  -LM      PT Received On 06/11/18  -LM      PT Goal Re-Cert Due Date 06/15/18  -LM         Time Calculation- PT    Total Timed Code Minutes- PT 17 minute(s)  -LM        User Key  (r) = Recorded By, (t) = Taken By, (c) = Cosigned By    Initials Name Provider Type    AMIRA Chang PT Physical Therapist          Therapy Charges for Today     Code Description Service Date Service Provider Modifiers Qty    08348500722 HC PT THER PROC EA 15 MIN 6/11/2018 Darlnee Chang, PT GP 1    55257748271 HC PT THER SUPP EA 15 MIN 6/11/2018 Darlene Chang, PT GP 1          PT G-Codes  Outcome Measure Options: AM-PAC 6 Clicks Basic Mobility (PT)    Darlene Chang PT  6/11/2018

## 2018-06-11 NOTE — PLAN OF CARE
Problem: Patient Care Overview  Goal: Plan of Care Review  Outcome: Ongoing (interventions implemented as appropriate)   06/11/18 1526   Coping/Psychosocial   Plan of Care Reviewed With patient   Plan of Care Review   Progress improving   OTHER   Outcome Summary OT eval complete. Pt declined to compelte sit to stand or chair to bed transfer without lift. Pt completed bath sitting in chair with maxA to wash back and legs. Pt rolled L to R with maxA to place sling and transferred back to bed via mechanical lift. Recommend SNF rehab.

## 2018-06-11 NOTE — PLAN OF CARE
Problem: Patient Care Overview  Goal: Plan of Care Review  Outcome: Ongoing (interventions implemented as appropriate)   06/11/18 0501   Coping/Psychosocial   Plan of Care Reviewed With patient   Plan of Care Review   Progress improving

## 2018-06-11 NOTE — PROGRESS NOTES
"Paulie Jordan  [id number]  1980     LOS: 13 days   Patient Care Team:  Cesar Neri MD as PCP - General  Cesar Neri MD as PCP - Family Medicine  Cesar Neri MD as PCP - Claims Attributed  Ernestine Barajas, RN as Care Coordinator ([facility])    Chief Complaint: Coronary artery disease      Subjective:     Objective:     Vital Sign Min/Max for last 24 hours  Temp  Min: 98.2 °F (36.8 °C)  Max: 99.3 °F (37.4 °C)   BP  Min: 109/73  Max: 137/81   Pulse  Min: 73  Max: 86   Resp  Min: 18  Max: 19   SpO2  Min: 83 %  Max: 91 %   No Data Recorded   No Data Recorded     Flowsheet Rows      First Filed Value   Admission Height  182.9 cm (72\") Documented at 05/29/2018 0100   Admission Weight  129 kg (284 lb) Documented at 05/29/2018 0100          Physical Exam:    Wound:Satisfactory    Pulses:     Mediastinal and Chest Tube Drainage:       Results Review:     Results from last 7 days  Lab Units 06/10/18  0612  06/08/18  0518   WBC 10*3/mm3  --   --  8.49   HEMOGLOBIN g/dL 7.6*  < > 7.4*   HEMATOCRIT % 24.6*  < > 23.8*   PLATELETS 10*3/mm3  --   --  183   < > = values in this interval not displayed.    Results from last 7 days  Lab Units 06/10/18  0612   SODIUM mmol/L 136   POTASSIUM mmol/L 5.3   CHLORIDE mmol/L 105   CO2 mmol/L 23.0   BUN mg/dL 29*   CREATININE mg/dL 1.46*   GLUCOSE mg/dL 92   CALCIUM mg/dL 8.6*       Results from last 7 days  Lab Units 06/04/18 2007   PH, ARTERIAL pH units 7.343*   PO2 ART mm Hg 93.3   PCO2, ARTERIAL mm Hg 44.2   HCO3 ART mmol/L 24.0         Assessment    Principal Problem:    CAD with previous history of stents. Most recent stent 3/16/18  Active Problems:    Hypertension    Dyslipidemia    History of leukemia    Neuropathy due to chemotherapeutic drug. Wheelchair bound with chronic pain    Tobacco abuse    Obesity    NSTEMI 5/29/18 s/p thrombectomy and PTCA    KENNETH (acute kidney injury)    Wheelchair bound    S/P CABG x 2 on 6/4/18    Chronic narcotic use      Await " placement, discharge planner to see        Ramesh Leggett MD  06/11/18  6:42 AM      Please note that portions of this note were completed with a voice recognition program. Efforts were made to edit the dictations, but words may be mistranscribed

## 2018-06-11 NOTE — PROGRESS NOTES
Continued Stay Note   [place]     Patient Name: Paulie Jordan  MRN: [id number]  Today's Date: 6/11/2018    Admit Date: 5/29/2018          Discharge Plan     Row Name 06/11/18 1036       Plan    Plan [facility] pending bed offer    Patient/Family in Agreement with Plan yes    Plan Comments Met with patient and father at bedside. 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 Currently is requiring max assist of 2 for transfers due to deconditioning from hospitalization. Called referral to [facility] with [facility] per patient's request. Awaiting bed offer. Allie Betts RN [phone]              Discharge Codes    No documentation.       Expected Discharge Date and Time     Expected Discharge Date Expected Discharge Time    Jun 12, 2018             Allie Betts RN

## 2018-06-11 NOTE — NURSING NOTE
"Patient Name:  Paulie Jordan  :  1980  DOS:  18    Hospital Problem List     * (Principal)CAD with previous history of stents. Most recent stent 3/16/18    Overview Addendum 2018  7:47 AM by ROBBIN Kunz     1.   NSTEMI 2016   2. Cardiac catheterization 2016: Single vessel- CAD with total ostial occlusion of LAD. PTCA/stent of LAD with 3.5 x 33 mm ALISHA reducing 100% stenosis to 0%. EF 40-45%   3. Echocardiogram 2016: EF 55%. Trace MR. Trace TR.  4. NSTEMI 2017: LHC demonstrating normal EF, widely patent LAD stent, no obstructive disease   5. Recurrent NSTEMI 2018: treated medically due to recent LHC demonstrating no obstructive disease, felt to be related to small vessel disease vs coronary spasm   6. Echo 2/3/2018: EF 60%  7. NSTEMI 2018 (with transient 1mm FRITZ inferior leads prior to [facility] arrival)   · Cardiac catheterrization  3/16/18 Dr Munguia:  Anteroapical hypokinesia and left ventricular ejection fraction of 54%,  three-vessel nonobstructive coronary disease and a widely patent proximal LAD stent. Despite a \"borderline normal\" iFR, a \"culprit\" lesion in the ostium of his Ramus Intermedians artery as defined by an in lesion MLD of 1.3 mm and a large burden not only of plaque but of \"red\" thrombus status post PTCA and stenting with a 3 x 18 mm Xience Alpine stent.  40% ostial narrowing of a distal branch of the EDYTA with iFR of 0.90   8. NSTEMI, 18:  · LHC   severe 1 vessel coronary artery disease involving a 100% thrombotic ostial LAD stenosis.  The lesion is now status post aspiration thrombectomy and angioplasty.  There is at least a residual 30-40% ostial segment stenosis.  · There is a patent previously placed stent in the ostial ramus intermedius artery with at most a 20% ostial segment narrowing.    9. Coronary artery bypass graft ×2 with EVH.   saphenous vein graft to circumflex and left internal mammary artery to LAD, 18         Hypertension    " Dyslipidemia    History of leukemia    Neuropathy due to chemotherapeutic drug. Wheelchair bound with chronic pain    Tobacco abuse    Obesity    NSTEMI 5/29/18 s/p thrombectomy and PTCA    KENNETH (acute kidney injury)    Wheelchair bound    S/P CABG x 2 on 6/4/18    Chronic narcotic use           Medical records have been reviewed.  Patient admitted with NSTEMI and found to have complete occlusion of LAD. He underwent CABG x2 6/4/18. LVEF 40% Patient is a good candidate for the [facility].  Education provided.  Education time 15 min.  Patient to be transferred to rehab facility and will follow up with us in 2 weeks.    Echo Results:5/29/18  · The study is technically difficult for diagnosis.  · Left ventricular systolic function is mildly decreased. Estimated EF = 40%.  · The following left ventricular wall segments are hypokinetic: mid anterior, apical anterior, apical inferior, apical septal, apex hypokinetic and mid anteroseptal.        Heart Failure Quality Measures    ACE I, ARB, ARNI (if EF <40%)     If no contraindications:  KENNETH / CKD / Renal Stenosis    Evidence-based Beta Blocker (EF<40%)    (Bisoprolol, Carvedilol, Metoprolol Succinate)  On metoprolol tartrate for CAD/rate control. EF 40%    Heart Failure Education    Low Na diet, Role of [facility] and when to call and EF teaching

## 2018-06-11 NOTE — PROGRESS NOTES
Continued Stay Note  [facility]     Patient Name: Paulie Jordan  MRN: [id number]  Today's Date: 6/11/2018    Admit Date: 5/29/2018          Discharge Plan     Row Name 06/11/18 1612       Plan    Plan Awaiting bed offer    Patient/Family in Agreement with Plan yes    Plan Comments Still waiting on bed offer from [facility]Rebecca. Per Rebecca, if they can offer a bed it would likely not be until Wednesday. Also called referral to [facility]/Laura. Laura will have MD review and let me know if they can offer a bed. Updated patient with status at bedside. Family can transport. CM will continue to follow. Allie Betts RN [phone]    Final Discharge Disposition Code 62 - inpatient rehab facility              Discharge Codes    No documentation.       Expected Discharge Date and Time     Expected Discharge Date Expected Discharge Time    Jun 12, 2018             Allie Betts RN

## 2018-06-11 NOTE — PROGRESS NOTES
[facility] Medicine Services  PROGRESS NOTE    Patient Name: Paulie Jordan  : 1980  MRN: [id number]    Date of Admission: 2018  Length of Stay: 13  Primary Care Physician: Cesar Neri MD    Subjective     CC: medical management     HPI:  Patient sitting up in chair. States he is tired today, but otherwise no complaints. Not much appetite, still has significant pain in chest, but current regimen controlling. Nervous about bearing too much weight in upper body for wheelchair transfers after surgery.    Review of Systems   Gen- No fevers, chills  CV- No chest pain, palpitations  Resp- No cough, dyspnea  GI- No N/V/D, abd pain    Otherwise ROS is negative except as mentioned in the HPI.    Objective     Vital Signs:   Temp:  [98.2 °F (36.8 °C)-99.3 °F (37.4 °C)] 98.7 °F (37.1 °C)  Heart Rate:  [73-81] 80  Resp:  [18-20] 20  BP: (105-116)/(65-74) 113/65        Physical Exam:  Constitutional: No acute distress, awake, alert and conversant.   HENT: NCAT, mucous membranes moist  Respiratory: Clear to auscultation bilaterally, breath sounds diminished bilaterally without wheezes, rales or rhonchi. Normal respiratory effort.   Cardiovascular: RRR, no murmurs, rubs, or gallops, palpable pedal pulses bilaterally. Sternal incision and EVH sites c/d/i without erythema, induration or drainage, multiple incisions on legs with staples intact  Gastrointestinal: Positive bowel sounds, soft, nontender, nondistended  Musculoskeletal: 2+ pitting BLE edema   Psychiatric: Appropriate affect, cooperative  Neurologic: Oriented x 3, strength symmetric in all extremities, Cranial Nerves grossly intact to confrontation, speech clear  Skin: No rashes    Results Reviewed:  I have personally reviewed current lab, radiology, and data and agree.      Results from last 7 days  Lab Units 06/10/18  0612 18  0506 18  0518 18  0351 18  0350   WBC 10*3/mm3  --   --  8.49 11.18* 10.45    HEMOGLOBIN g/dL 7.6* 7.3* 7.4* 7.5* 8.2*   HEMATOCRIT % 24.6* 23.9* 23.8* 24.0* 26.1*   PLATELETS 10*3/mm3  --   --  183 154 138*       Results from last 7 days  Lab Units 06/10/18  0612 06/09/18  0506 06/08/18  0518   SODIUM mmol/L 136 134 137   POTASSIUM mmol/L 5.3 4.7 4.3   CHLORIDE mmol/L 105 103 105   CO2 mmol/L 23.0 25.0 26.0   BUN mg/dL 29* 31* 28*   CREATININE mg/dL 1.46* 1.58* 1.48*   GLUCOSE mg/dL 92 84 108*   CALCIUM mg/dL 8.6* 8.3* 8.2*     Estimated Creatinine Clearance: 96.1 mL/min (A) (by C-G formula based on SCr of 1.46 mg/dL (H)).     No results found for: BNP  No results found for: PHART    Microbiology Results Abnormal     None        Imaging Results (last 24 hours)     ** No results found for the last 24 hours. **        Results for orders placed during the hospital encounter of 05/29/18   Adult Transthoracic Echo Complete W/ Cont if Necessary Per Protocol    Narrative · The study is technically difficult for diagnosis.  · Left ventricular systolic function is mildly decreased. Estimated EF =   40%.  · The following left ventricular wall segments are hypokinetic: mid   anterior, apical anterior, apical inferior, apical septal, apex   hypokinetic and mid anteroseptal.        I have reviewed the medications.    Assessment / Plan     Hospital Problem List     * (Principal)CAD with previous history of stents. Most recent stent 3/16/18    Overview Addendum 6/5/2018  7:47 AM by ROBBIN Kunz     1.   NSTEMI 8/22/2016   2. Cardiac catheterization 8/22/2016: Single vessel- CAD with total ostial occlusion of LAD. PTCA/stent of LAD with 3.5 x 33 mm ALISHA reducing 100% stenosis to 0%. EF 40-45%   3. Echocardiogram 8/23/2016: EF 55%. Trace MR. Trace TR.  4. NSTEMI 09/2017: LHC demonstrating normal EF, widely patent LAD stent, no obstructive disease   5. Recurrent NSTEMI 02/2018: treated medically due to recent LHC demonstrating no obstructive disease, felt to be related to small vessel disease vs  "coronary spasm   6. Echo 2/3/2018: EF 60%  7. NSTEMI 03/2018 (with transient 1mm FRITZ inferior leads prior to [facility] arrival)   · Cardiac catheterrization  3/16/18 Dr Munguia:  Anteroapical hypokinesia and left ventricular ejection fraction of 54%,  three-vessel nonobstructive coronary disease and a widely patent proximal LAD stent. Despite a \"borderline normal\" iFR, a \"culprit\" lesion in the ostium of his Ramus Intermedians artery as defined by an in lesion MLD of 1.3 mm and a large burden not only of plaque but of \"red\" thrombus status post PTCA and stenting with a 3 x 18 mm Xience Alpine stent.  40% ostial narrowing of a distal branch of the EDYTA with iFR of 0.90   8. NSTEMI, 5-29-18:  · [facility]   severe 1 vessel coronary artery disease involving a 100% thrombotic ostial LAD stenosis.  The lesion is now status post aspiration thrombectomy and angioplasty.  There is at least a residual 30-40% ostial segment stenosis.  · There is a patent previously placed stent in the ostial ramus intermedius artery with at most a 20% ostial segment narrowing.    9. Coronary artery bypass graft ×2 with EVH.   saphenous vein graft to circumflex and left internal mammary artery to LAD, 6-4-18         Hypertension    Dyslipidemia    History of leukemia    Neuropathy due to chemotherapeutic drug. Wheelchair bound with chronic pain    Tobacco abuse    Obesity    NSTEMI 5/29/18 s/p thrombectomy and PTCA    KENNETH (acute kidney injury)    Wheelchair bound    S/P CABG x 2 on 6/4/18    Chronic narcotic use        Brief Hospital Course to date:  Paulie Jordan is a 38 y.o. male debilitated wheelchair-bound male with a history of CAD, HTN, hyperlipidemia, IBS, hypothyroidism and leukemia (initially diagnosed in 1998 with recurrence in 2000). He is s/p chemotherapy, radiation and bone marrow transplant (2000). This left him with significant neuropathy and inability to ambulate. History of multiple NSTEMIs, most recently 3/2018 for which he underwent PCTA " and stent and 5/25/2018. He was transferred to [facility], underwent LHC revealing occluded distal LAD and underwent thrombectomy and angioplasty. Because of residual disease, he underwent CABG x 2 on 6/4/2018.     Home MS Contin has been increased to 45mg PO BID. Despite this, he continues to ask for pain medications (PO or IV) frequently. He intermittently refuses to work with physical therapy. Did have significant residual atelectasis which has nearly resolved. Transferred to the floor 6/8 and hospital medicine is following for medical management.     Coronary artery disease s/p CABG x 2 on 6/4/18 by Dr. Leggett  - Post-operative management per CT surgery   - No further increases in narcotic pain medications. Unfortunately, cannot use NSAIDS due to his renal insufficiency. Explained to him that we will not be able to get him completely pain free and that our goal is to keep his pain manageable (Pain medication dispensed by father at home with h/o opiate abuse)    KENNETH, Creatinine stable, continue to monitor. Good UO    HTN, BP controlled  Hyperlipidemia, statin  Chronic systolic heart failure, cardiology following. Hypotension limits use of ACEI/ARB or Entresto    Tobacco abuse, continue to encourage cessation- cans of dip on bs table  Obesity, complicated all aspects of care    Chemotherapy-induced neuropathy  Chronic pain with chronic narcotic use, he would benefit from a suboxone clinic   Wheelchair bound state   - Continue PT/OT, patient/family requesting rehab at discharge  - Chronic pain issues make his pain control more challenging - father has requested information about rehab programs    DVT Prophylaxis: Heparin   CODE STATUS: Full Code    Disposition: I expect the patient to be discharged to rehab when bed available    Electronically signed by KRISTOPHER Cuellar, 06/11/18, 9:45 AM.

## 2018-06-12 NOTE — THERAPY TREATMENT NOTE
Acute Care - Occupational Therapy Treatment Note  [facility]     Patient Name: Paulie Jordan  : 1980  MRN: [id number]  Today's Date: 2018  Onset of Illness/Injury or Date of Surgery: 18  Date of Referral to OT: 06/10/18  Referring Physician: MD Oleksandr    Admit Date: 2018       ICD-10-CM ICD-9-CM   1. Coronary artery disease involving native coronary artery of native heart with angina pectoris I25.119 414.01     413.9   2. NSTEMI (non-ST elevated myocardial infarction) I21.4 410.70   3. Impaired functional mobility, balance, gait, and endurance Z74.09 V49.89   4. Impaired mobility and ADLs Z74.09 799.89     Patient Active Problem List   Diagnosis   • Hypertension   • CAD with previous history of stents. Most recent stent 3/16/18   • Dyslipidemia   • History of leukemia   • Neuropathy due to chemotherapeutic drug. 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     Past Surgical History:   Procedure Laterality Date   • BONE MARROW TRANSPLANT     • CARDIAC CATHETERIZATION N/A 2016    Procedure: Left Heart Cath;  Surgeon: Devorah Hightower MD;  Location:  [facility];  Service:    • CARDIAC CATHETERIZATION N/A 2017    Procedure: Left Heart Cath;  Surgeon: Devorah Hightower MD;  Location:  [facility];  Service:    • CARDIAC CATHETERIZATION N/A 3/16/2018    Procedure: Left Heart Cath;  Surgeon: Bryon Munguia MD;  Location:  [facility];  Service: Cardiology   • CARDIAC CATHETERIZATION N/A 2018    Procedure: Left Heart Cath;   Surgeon: Bryon Triana MD;  Location:  [facility];  Service: Cardiovascular   • CORONARY ARTERY BYPASS GRAFT N/A 6/4/2018    Procedure: MEDIAN STERNOTOMY, CORONARY ARTERY BYPASS X  2 WITH INTERNAL MAMMARY ARTERY GRAFT, EVH OF THE RIGHT GREATER SAPHENOUS VEIN AND EXPLORATION OF THE LEFT LEG;  Surgeon: Ramesh Leggett MD;  Location:  [facility] OR;  Service: Cardiothoracic   • CORONARY STENT PLACEMENT         Therapy Treatment          Rehabilitation Treatment Summary     Row Name 06/12/18 1500             Treatment Time/Intention    Discipline occupational therapist  -AN      Document Type therapy note (daily note)  -AN      Subjective Information complains of;fatigue  -AN      Mode of Treatment occupational therapy  -AN      Patient/Family Observations pt in bed, Fowlers position  -AN      Care Plan Review care plan/treatment goals reviewed  -AN      Patient Effort adequate  -AN      Comment pt declined txfrs or ADL's  -AN      Existing Precautions/Restrictions cardiac;sternal;fall  -AN      Recorded by [AN] Angelic Velasco OT 06/12/18 1547      Row Name 06/12/18 1500             Vital Signs    Pre Systolic BP Rehab --   stable  -AN      Recorded by [AN] Angelic Velasco OT 06/12/18 1547      Row Name 06/12/18 1500             Cognitive Assessment/Intervention- PT/OT    Orientation Status (Cognition) oriented x 4  -AN      Follows Commands (Cognition) WFL  -AN      Cognitive Function (Cognitive) safety deficit  -AN      Recorded by [AN] Angelic Velasco OT 06/12/18 1547      Row Name 06/12/18 1500             Bed Mobility Assessment/Treatment    Comment (Bed Mobility) pt declined  -AN      Recorded by [AN] Angelic Velasco OT 06/12/18 1547      Row Name 06/12/18 1500             Transfer Assessment/Treatment    Comment (Transfers) pt declined  -AN      Recorded by [AN] Angelic Velasco OT 06/12/18 1547      Row Name 06/12/18 1500             Bathing Assessment/Intervention    Comment (Bathing) pt declined all  ADL's  -AN      Recorded by [AN] Angelic Velasco OT 06/12/18 1547      Row Name 06/12/18 1500             Motor Skills Assessment/Interventions    Additional Documentation Therapeutic Exercise (Group)  -AN      Recorded by [AN] Angelic Velasco OT 06/12/18 1547      Row Name 06/12/18 1500             Therapeutic Exercise    59011 - OT Therapeutic Exercise Minutes 8  -AN      Recorded by [AN] Angelic Velasco OT 06/12/18 1547      Row Name 06/12/18 1500             Therapeutic Exercise    Upper Extremity Range of Motion (Therapeutic Exercise) shoulder horizontal abduction/adduction, bilateral;shoulder internal/external rotation, bilateral;elbow flexion/extension, bilateral;forearm supination/pronation, bilateral;wrist flexion/extension, bilateral  -AN      Exercise Type (Therapeutic Exercise) AROM (active range of motion)  -AN      Sets/Reps (Therapeutic Exercise) 2 sets of 10  -AN      Comment (Therapeutic Exercise) 2 rest breaks  -AN      Recorded by [AN] Angelic Velasco OT 06/12/18 1547      Row Name 06/12/18 1500             Positioning and Restraints    Pre-Treatment Position in bed  -AN      Post Treatment Position bed  -AN      Recorded by [AN] Angelic Velasco OT 06/12/18 1547      Row Name 06/12/18 1500             Pain Scale: Numbers Pre/Post-Treatment    Pain Scale: Numbers, Pretreatment 0/10 - no pain  -AN      Pain Scale: Numbers, Post-Treatment 0/10 - no pain  -AN      Recorded by [AN] Angelic Velasco OT 06/12/18 1547      Row Name                Wound 06/04/18 0826 chest incision    Wound - Properties Group Date first assessed: 06/04/18 [SH] Time first assessed: 0826 [SH] Location: chest [SH] Type: incision [SH] Recorded by:  [SH] Sherri L Haase, RN 06/04/18 0826    Row Name                Wound 06/04/18 0917 Right leg incision    Wound - Properties Group Date first assessed: 06/04/18 [SH] Time first assessed: 0917 [SH] Side: Right [SH] Location: leg [SH] Type: incision [SH] Recorded by:  [SH] Sherri L Haase,  RN 06/04/18 0917    Row Name                Wound 06/04/18 1454 Left surgical    Wound - Properties Group Date first assessed: 06/04/18 [JOHNNY] Time first assessed: 1454 [JOHNNY] Side: Left [JOHNNY] Type: surgical [JOHNNY] Recorded by:  [JOHNNY] Rajiv Bryson RN 06/04/18 1454    Row Name 06/12/18 1500             Plan of Care Review    Plan of Care Reviewed With patient  -AN      Recorded by [AN] Angelic Velasco OT 06/12/18 1547      Row Name 06/12/18 1500             Outcome Summary/Treatment Plan (OT)    Daily Summary of Progress (OT) progress toward functional goals is gradual  -AN      Anticipated Discharge Disposition (OT) inpatient rehabilitation facility  -AN      Recorded by [PORSCHE] Angelic Velasco OT 06/12/18 1547        User Key  (r) = Recorded By, (t) = Taken By, (c) = Cosigned By    Initials Name Effective Dates Discipline    AN Angelic Velasco OT 06/22/15 -  OT    SH Sherri L Haase, RN 06/16/16 -  Nurse    JOHNNY Bryson RN 06/16/16 -  Nurse        Wound 06/04/18 0826 chest incision (Active)   Dressing Appearance open to air 6/12/2018  8:00 AM   Closure Liquid skin adhesive;Staples 6/12/2018  8:00 AM   Base scab 6/12/2018  8:00 AM   Periwound intact;dry 6/12/2018  8:00 AM   Periwound Temperature warm 6/12/2018  8:00 AM   Drainage Amount none 6/12/2018  8:00 AM   Dressing Care, Wound open to air 6/11/2018  8:56 PM       Wound 06/04/18 0917 Right leg incision (Active)   Dressing Appearance open to air 6/12/2018  8:00 AM   Closure Staples 6/12/2018  8:00 AM   Periwound dry;intact 6/12/2018  8:00 AM   Periwound Temperature warm 6/12/2018  8:00 AM   Dressing Care, Wound open to air 6/11/2018  8:56 PM       Wound 06/04/18 1454 Left surgical (Active)   Dressing Appearance open to air 6/12/2018  8:00 AM   Closure Staples 6/12/2018  8:00 AM   Periwound ecchymotic 6/12/2018  8:00 AM   Periwound Temperature warm 6/12/2018  8:00 AM   Periwound Skin Turgor soft 6/12/2018  8:00 AM   Dressing Care, Wound open to air 6/11/2018   8:56 PM         Occupational Therapy Education     Title: PT OT SLP Therapies (Active)     Topic: Occupational Therapy (Done)     Point: ADL training (Done)     Description: Instruct learner(s) on proper safety adaptation and remediation techniques during self care or transfers.   Instruct in proper use of assistive devices.   Learning Progress Summary     Learner Status Readiness Method Response Comment Documented by    Patient Done Acceptance E VU pt educated on ADL retraining with bathing, safety precautions, and appropriate body mechanics.  06/11/18 8734          Point: Home exercise program (Done)     Description: Instruct learner(s) on appropriate technique for monitoring, assisting and/or progressing therapeutic exercises/activities.   Learning Progress Summary     Learner Status Readiness Method Response Comment Documented by    Patient Done Acceptance E,D,TB VU,DU,NR Educated on daily HEP, restrictions and benefits of getting OOB.  06/12/18 7387          Point: Precautions (Done)     Description: Instruct learner(s) on prescribed precautions during self-care and functional transfers.   Learning Progress Summary     Learner Status Readiness Method Response Comment Documented by    Patient Done Acceptance E VU pt educated on ADL retraining with bathing, safety precautions, and appropriate body mechanics.  06/11/18 1354          Point: Body mechanics (Done)     Description: Instruct learner(s) on proper positioning and spine alignment during self-care, functional mobility activities and/or exercises.   Learning Progress Summary     Learner Status Readiness Method Response Comment Documented by    Patient Done Acceptance E VU pt educated on ADL retraining with bathing, safety precautions, and appropriate body mechanics.  06/11/18 0881                      User Key     Initials Effective Dates Name Provider Type Discipline     06/22/15 -  Angelic Velasco OT Occupational Therapist OT     03/07/18 -   Darby Whipple, OT Occupational Therapist OT                OT Recommendation and Plan  Outcome Summary/Treatment Plan (OT)  Daily Summary of Progress (OT): progress toward functional goals is gradual  Anticipated Discharge Disposition (OT): inpatient rehabilitation facility  Daily Summary of Progress (OT): progress toward functional goals is gradual  Plan of Care Review  Plan of Care Reviewed With: patient  Plan of Care Reviewed With: patient  Outcome Summary: Pt. agreeable to bed ex's, declined txfrs and ADL's despite education on benefits. Pt scheduled for [facility] tomorrow.        Outcome Measures     Row Name 06/12/18 1534 06/11/18 1115 06/11/18 1012       How much help from another person do you currently need...    Turning from your back to your side while in flat bed without using bedrails?  --  -- 2  -LM    Moving from lying on back to sitting on the side of a flat bed without bedrails?  --  -- 1  -LM    Moving to and from a bed to a chair (including a wheelchair)?  --  -- 2  -LM    Standing up from a chair using your arms (e.g., wheelchair, bedside chair)?  --  -- 2  -LM    Climbing 3-5 steps with a railing?  --  -- 1  -LM    To walk in hospital room?  --  -- 1  -LM    AM-PAC 6 Clicks Score  --  -- 9  -LM       How much help from another is currently needed...    Putting on and taking off regular lower body clothing? 1  -AN 1  -HK  --    Bathing (including washing, rinsing, and drying) 2  -AN 2  -HK  --    Toileting (which includes using toilet bed pan or urinal) 1  -AN 1  -HK  --    Putting on and taking off regular upper body clothing 2  -AN 2  -HK  --    Taking care of personal grooming (such as brushing teeth) 3  -AN 3  -HK  --    Eating meals 4  -AN 4  -HK  --    Score 13  -AN 13  -HK  --       Functional Assessment    Outcome Measure Options  -- AM-PAC 6 Clicks Daily Activity (OT)  -HK AM-PAC 6 Clicks Basic Mobility (PT)  -LM      User Key  (r) = Recorded By, (t) = Taken By, (c) = Cosigned By    Initials  Name Provider Type    AN Angelic Velasco OT Occupational Therapist    LM Darlene Chang, PT Physical Therapist    HK Darby Whpiple, OT Occupational Therapist           Time Calculation:         Time Calculation- OT     Row Name 06/12/18 1551 06/12/18 1549 06/12/18 1500       Time Calculation- OT    OT Start Time 1534  -AN 1534  -AN  --    Total Timed Code Minutes- OT 8 minute(s)  -AN  --  --    OT Received On 06/12/18  -AN 06/12/18  -AN  --    OT Goal Re-Cert Due Date 06/21/18  -AN  --  --       Timed Charges    94937 - OT Therapeutic Exercise Minutes  --  -- 8  -AN      User Key  (r) = Recorded By, (t) = Taken By, (c) = Cosigned By    Initials Name Provider Type    AN Angelic Velasco OT Occupational Therapist           Therapy Suggested Charges     Code   Minutes Charges    96176 (CPT®) Hc Ot Neuromusc Re Education Ea 15 Min      65528 (CPT®) Hc Ot Ther Proc Ea 15 Min 8 1    48719 (CPT®) Hc Gait Training Ea 15 Min      33840 (CPT®) Hc Ot Therapeutic Act Ea 15 Min      55305 (CPT®) Hc Ot Manual Therapy Ea 15 Min      80593 (CPT®) Hc Ot Iontophoresis Ea 15 Min      28819 (CPT®) Hc Ot Elec Stim Ea-Per 15 Min      78660 (CPT®) Hc Ot Ultrasound Ea 15 Min      15796 (CPT®) Hc Ot Self Care/Mgmt/Train Ea 15 Min      Total  8 1        Therapy Charges for Today     Code Description Service Date Service Provider Modifiers Qty    [id number] HC OT THER PROC EA 15 MIN 6/12/2018 Angelic Velasco OT GO 1               Angelic Velasco OT  6/12/2018

## 2018-06-12 NOTE — DISCHARGE PLACEMENT REQUEST
"Allie Betts RN  Case Management  P: [phone]    Looking for male short-term rehab bed      Brynn Mckeon (38 y.o. Male)     Date of Birth Social Security Number Address Home Phone MRN    1980  [address] [place] [address]  [place] [address] [phone] [id number]    Amish Marital Status          Confucianism        Admission Date Admission Type Admitting Provider Attending Provider Department, Room/Bed    5/29/18 Elective Ramesh Leggett MD Rogers, Anthony G, MD [facility] 4H, S465/1    Discharge Date Discharge Disposition Discharge Destination                       Attending Provider:  Ramesh Leggett MD    Allergies:  Benadryl [Diphenhydramine]    Isolation:  None   Infection:  None   Code Status:  FULL    Ht:  182.9 cm (72.01\")   Wt:  139 kg (307 lb 8 oz)    Admission Cmt:  None   Principal Problem:  CAD with previous history of stents. Most recent stent 3/16/18 [I25.10] More...                 Active Insurance as of 5/29/2018     Primary Coverage     Payor Plan Insurance Group Employer/Plan Group    MEDICARE MEDICARE A & B      Payor Plan Address Payor Plan Phone Number Effective From Effective To    [address] [phone] 4/1/2001     [facility] [address]       Subscriber Name Subscriber Birth Date Member ID       BRYNN MCKEON 1980 [id number]           Secondary Coverage     Payor Plan Insurance Group Employer/Plan Group    [place]      Payor Plan Address Payor Plan Phone Number Effective From Effective To    [address] [address]  1/1/2014     [facility] [id number]       Subscriber Name Subscriber Birth Date Member ID       BRYNN MCKEON 1980 [id number]                 Emergency Contacts      (Rel.) Home Phone Work Phone Mobile Phone    Gutierrez Mckeon (Father) [phone] -- --    NikkiMarisa (Sister) [phone] -- --               History & Physical      Mary Jane Aguero DO at 5/29/2018  2:11 AM      "         [facility] Medicine Services  HISTORY AND PHYSICAL    Patient Name: Paulie Jordan  : 1980  MRN: [id number]  Primary Care Physician: Cesar Neri MD    Subjective   Subjective     Chief Complaint:  Chest Pain    HPI:  Paulie Jordan is a 37 y.o. male with a past medical history significant for CAD s/p MI x2 with stent placement, HTN, HLD, and leukemia in remission who presents as a transfer from [facility] after initially being evaluated for chest pain. States he had just finished eating and celebrating the holiday when he suddenly had pain in arms bilaterally. Onset around 1pm. States pain migrated to left side of chest then radiated to right side and up jaws bilaterally. Pain is constant and 9/10 at its worst. He endorses associated nausea without vomiting, diaphoresis, and dyspnea. The patient took 3 sublingual NTG and 3 baby ASA without improvement. States he had a similar presentation of symptoms this past Friday. He took 4 sublingual NTG and a pain pill. Symptoms improved. When he didn't get relief today he became concerned, prompting a visit to ED. Currently with no complaints of cough, congestion, fever, or peripheral edema, or syncope. Last cardiac cath was 3/2018 which involved stent placement.    Work up per OSH demonstrated troponin elevated to 6.0. EKG normal sinus. Vitals stable. And chemistry and hematology favorable. Patient was subsequently transferred for higher level of care.    Review of Systems   Constitutional: Negative for chills and fever.   HENT: Negative for congestion and trouble swallowing.    Eyes: Negative for photophobia and visual disturbance.   Respiratory: Positive for shortness of breath. Negative for cough.    Cardiovascular: Positive for chest pain and leg swelling.   Gastrointestinal: Positive for nausea. Negative for abdominal pain, diarrhea and vomiting.   Endocrine: Negative for cold intolerance and heat intolerance.    Genitourinary: Negative for dysuria and flank pain.   Musculoskeletal: Negative for back pain and gait problem.   Skin: Negative for pallor and rash.   Allergic/Immunologic: Negative for immunocompromised state.   Neurological: Negative for dizziness, weakness and headaches.   Hematological: Negative for adenopathy.   Psychiatric/Behavioral: Negative for agitation and confusion.          Otherwise 10-system ROS reviewed and is negative except as mentioned in the HPI.    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       Past Surgical History:   Procedure Laterality Date   • BONE MARROW TRANSPLANT     • CARDIAC CATHETERIZATION N/A 8/22/2016    Procedure: Left Heart Cath;  Surgeon: Devorah Hightower MD;  Location:  [facility];  Service:    • CARDIAC CATHETERIZATION N/A 9/23/2017    Procedure: Left Heart Cath;  Surgeon: Devorah Hightower MD;  Location:  [facility];  Service:    • CARDIAC CATHETERIZATION N/A 3/16/2018    Procedure: Left Heart Cath;  Surgeon: Bryon Munguia MD;  Location:  [facility];  Service: Cardiology   • CORONARY STENT PLACEMENT         Family History: family history includes Arthritis in his mother; Cancer in his father; Heart attack in his maternal grandmother; Hypertension in his father; No Known Problems in his sister; Stroke in his maternal grandmother.     Social History:  reports that he has never smoked. His smokeless tobacco use includes Chew. He reports that he does not drink alcohol or use drugs.  Social History     Social History Narrative   • No narrative on file       Medications:  Prescriptions Prior to Admission   Medication Sig Dispense Refill Last Dose   • aspirin 81 MG tablet Take 1 tablet by mouth Daily. 30 tablet  11 5/28/2018 at Unknown time   • atorvastatin (LIPITOR) 80 MG tablet Take 1 tablet by mouth Every Night.   5/27/2018 at Unknown time   • buPROPion XL (WELLBUTRIN XL) 300 MG 24 hr tablet Take 300 mg by mouth Daily.   5/28/2018 at Unknown time   • carvedilol (COREG) 6.25 MG tablet Take 6.25 mg by mouth 2 (Two) Times a Day.   5/28/2018 at Unknown time   • citalopram (CeleXA) 40 MG tablet Take 40 mg by mouth daily.   5/28/2018 at Unknown time   • diazePAM (VALIUM) 10 MG tablet Take 10 mg by mouth At Night As Needed for Sedation.   5/27/2018 at Unknown time   • levothyroxine (SYNTHROID, LEVOTHROID) 50 MCG tablet Take 50 mcg by mouth daily.   5/28/2018 at Unknown time   • morphine (MS CONTIN) 30 MG 12 hr tablet Take 30 mg by mouth 2 (Two) Times a Day.   5/28/2018 at Unknown time   • nitroglycerin (NITROSTAT) 0.4 MG SL tablet Place 0.4 mg under the tongue Every 5 (Five) Minutes As Needed for Chest Pain. Take no more than 3 doses in 15 minutes.   5/28/2018 at Unknown time   • olopatadine (PATANOL) 0.1 % ophthalmic solution Administer 1 drop to both eyes Daily As Needed for Allergies.   Past Week at Unknown time   • omeprazole (PriLOSEC) 20 MG capsule Take 20 mg by mouth daily.   5/28/2018 at Unknown time   • ticagrelor (BRILINTA) 90 MG tablet tablet Take 1 tablet by mouth 2 (Two) Times a Day. 60 tablet 11 5/28/2018 at Unknown time       Allergies   Allergen Reactions   • Benadryl [Diphenhydramine] Itching     IV benadryl per patient       Objective   Objective     Vital Signs:   Temp:  [98.5 °F (36.9 °C)] 98.5 °F (36.9 °C)  Heart Rate:  [79] 79  Resp:  [16] 16  BP: (112-130)/(82) 112/82        Physical Exam   Constitutional: No acute distress, awake, alert  Eyes: PERRLA, sclerae anicteric, no conjunctival injection  HENT: NCAT, mucous membranes moist  Neck: Supple, no thyromegaly, no lymphadenopathy, trachea midline  Respiratory: Clear to auscultation bilaterally, nonlabored respirations   Cardiovascular: RRR, murmur,murm  rubs, or gallops, palpable pedal pulses bilaterally  Gastrointestinal: Positive bowel sounds, soft, nontender, nondistended  Musculoskeletal: no clubbing or cyanosis to extremities  Trace BLE edema  Psychiatric: Appropriate affect, cooperative  Neurologic: Oriented x 3, strength symmetric in all extremities, Cranial Nerves grossly intact to confrontation, speech clear  Skin: No rashes      Results Reviewed:  I have personally reviewed current lab, radiology, and data and agree.      Results from last 7 days  Lab Units 05/28/18  1505   WBC 10*3/mm3 9.02   HEMOGLOBIN g/dL 15.5   HEMATOCRIT % 45.6   PLATELETS 10*3/mm3 243       Results from last 7 days  Lab Units 05/28/18  2231 05/28/18  1713 05/28/18  1505   SODIUM mmol/L  --   --  133*   POTASSIUM mmol/L  --   --  3.8   CHLORIDE mmol/L  --   --  104   CO2 mmol/L  --   --  22.9*   BUN mg/dL  --   --  12   CREATININE mg/dL  --   --  1.52*   GLUCOSE mg/dL  --   --  100   CALCIUM mg/dL  --   --  8.8   ALT (SGPT) U/L  --   --  34   AST (SGOT) U/L  --   --  33   TROPONIN I ng/mL 6.019* 0.623* 0.486*     Estimated Creatinine Clearance: 90.6 mL/min (A) (by C-G formula based on SCr of 1.52 mg/dL (H)).  Brief Urine Lab Results  (Last result in the past 365 days)      Color   Clarity   Blood   Leuk Est   Nitrite   Protein   CREAT   Urine HCG        03/15/18 1701 Dark Yellow(A) Clear Negative Negative Negative Negative             BNP   Date Value Ref Range Status   05/28/2018 15.0 0.0 - 100.0 pg/mL Final     No results found for: PHART  Imaging Results (last 24 hours)     Procedure Component Value Units Date/Time    XR Chest 1 View [[id number]] Updated:  05/29/18 0144        Results for orders placed during the hospital encounter of 02/03/18   Adult Transthoracic Echo Complete W/ Cont if Necessary Per Protocol    Narrative · Left ventricular systolic function is normal. Estimated EF = 60%.  · Left ventricular wall thickness is consistent with mild concentric   hypertrophy.  ·  Left ventricular diastolic dysfunction (grade I) consistent with   impaired relaxation.          Assessment/Plan   Assessment / Plan     Hospital Problem List     * (Principal)NSTEMI (non-ST elevated myocardial infarction)    KENNETH (acute kidney injury)    Hypertension    Coronary artery disease involving native coronary artery of native heart    Overview Addendum 2018 10:37 AM by ROBBIN Kunz     1.  [facility] 8-22-16  · Single-vessel CAD with total ostial occlusion of the LAD.  · Successful thrombectomy followed by PTCA/stenting of the LAD with 3.5 x 33 mm drug-coated stent reducing the 100% stenosis to 0%.  · No other significant CAD.  · Moderate left ventricular systolic dysfunction, ejection fraction 40-45%.  · Normal hemodynamics.            Hypercholesterolemia    Dyslipidemia    History of leukemia    Neuropathy due to chemotherapeutic drug    Tobacco abuse    Obesity            Assessment & Plan:  1. NSTEMI:  - history of MI x2. Last cardiac cath 3/2018 with stent placement  - troponin elevated to 6. Continue to trend.   - consult to cardiology. Patient is followed by Dr. Hightower but has been discussed with Dr. Bosch  - heparin gtt. NTG gtt. PRN morphine  - continue home Brillenta, ASA, BB, and statin  - lipid panel, TSH  - repeat EKG, A1C (history of prediabetes)  - am labs    2. KENNETH:  - baseline creatinine 1.0  - administer saline 75 cc/hr x 8 hours  - avoid additional nephrotoxins    3. History of leukemia:  - remission. Chronic pain secondary to chemotherapy  - on MS contin    4. Tobacco abuse:  - chews tobacco. Tobacco Cessation Counselin minutes of tobacco cessation counseling provided, including but not limited to, risks of ongoing tobacco use, pertinence to current or future health status and availability/examples of cessation resources.  Patient expresses desire to quit.      DVT prophylaxis: heparin gtt    CODE STATUS:  Full Code    Admission Status:  I believe this patient meets  INPATIENT status due to the need for care which can only be reasonably provided in an hospital setting such as aggressive/expedited ancillary services and/or consultation services, the necessity for IV medications, close physician monitoring and/or the possible need for procedures.  In such, I feel patient’s risk for adverse outcomes and need for care warrant INPATIENT evaluation and predict the patient’s care encounter to likely last beyond 2 midnights.      Electronically signed by Enrique Kuhn PA-C, 05/29/18, 2:11 AM.      Brief Attending Admission Attestation     I have seen and examined the patient, performing an independent face-to-face diagnostic evaluation with plan of care reviewed and developed with the advanced practice clinician (APC).      Brief Summary Statement/HPI:   Paulie Jordan is a 37 y.o. male with h/o CAD, MI, s/p stent placement, HTN, HLD, KENNETH, and chronic pain syndrome, who presents with c/o chest pain. Pt presents to [facility] with c/o chest pain and pain radiating into bilateral upper ext. States the pain was a heavy pressure. Assoc with SOA. Pt pt went to [facility] ED and was found to have trop of 0.486, increased to 0.623 and then to . Trop were trended and increased to 6.019. Pt was admitted. Started n nitro and he pamine gtt    Attending Physical Exam:  Vitals reviewed.   Gen-NAD  HEENT-atraumatic, normocephalic, PERRLA, EOMI, moist mucous membranes   CV-RRR, murmur noted over left sternal border.   Resp-CTAB, no rales, no rhonchi, no wheezing  Abd-normal BS, soft, ND, NT   Ext-trace edema, pedal pulses intact  Skin-warm, dry, no rashes or lesions noted  Neuro-A&Ox3, CN II-XII grossly intact, equal strength in upper and lower extremities  Psych-appropriate mood and behavior      Brief Assessment/Plan :  See above for further detailed assessment and plan developed with APC which I have reviewed and/or edited.      Electronically signed by Mary Jane Aguero DO, 05/29/18, 8:21 AM.            Electronically signed by Mary Jane Aguero DO at 5/29/2018  8:33 AM       Hospital Medications (active)       Dose Frequency Start End    acetaminophen (TYLENOL) tablet 650 mg 650 mg Every 4 Hours PRN 6/4/2018     Sig - Route: Take 2 tablets by mouth Every 4 (Four) Hours As Needed for Mild Pain . - Oral    aspirin EC tablet 325 mg 325 mg Daily 6/5/2018     Sig - Route: Take 1 tablet by mouth Daily. - Oral    atorvastatin (LIPITOR) tablet 80 mg 80 mg Nightly 5/29/2018     Sig - Route: Take 2 tablets by mouth Every Night. - Oral    bisacodyl (DULCOLAX) EC tablet 10 mg 10 mg Daily PRN 6/4/2018     Sig - Route: Take 2 tablets by mouth Daily As Needed for Constipation. - Oral    buPROPion XL (WELLBUTRIN XL) 24 hr tablet 300 mg 300 mg Daily 5/29/2018     Sig - Route: Take 2 tablets by mouth Daily. - Oral    citalopram (CeleXA) tablet 40 mg 40 mg Daily 5/29/2018     Sig - Route: Take 2 tablets by mouth Daily. - Oral    diazePAM (VALIUM) tablet 10 mg 10 mg Nightly PRN 6/4/2018     Sig - Route: Take 2 tablets by mouth At Night As Needed for Anxiety or Sedation. - Oral    docusate sodium (COLACE) capsule 100 mg 100 mg 2 Times Daily PRN 6/4/2018     Sig - Route: Take 1 capsule by mouth 2 (Two) Times a Day As Needed for Constipation. - Oral    febuxostat (ULORIC) tablet 40 mg 40 mg Daily 5/30/2018     Sig - Route: Take 1 tablet by mouth Daily. - Oral    heparin (porcine) 5000 UNIT/ML injection 5,000 Units 5,000 Units Every 8 Hours Scheduled 6/6/2018     Sig - Route: Inject 1 mL under the skin Every 8 (Eight) Hours. - Subcutaneous    levothyroxine (SYNTHROID, LEVOTHROID) tablet 50 mcg 50 mcg Every Early Morning 5/29/2018     Sig - Route: Take 1 tablet by mouth Every Morning. - Oral    metoprolol tartrate (LOPRESSOR) tablet 25 mg 25 mg Every 12 Hours Scheduled 6/12/2018     Sig - Route: Take 1 tablet by mouth Every 12 (Twelve) Hours. - Oral    Morphine (MS CONTIN) 12 hr tablet 45 mg 45 mg Every 12 Hours Scheduled 6/7/2018  6/15/2018    Sig - Route: Take 45 mg by mouth Every 12 (Twelve) Hours. - Oral    nicotine (NICODERM CQ) 21 MG/24HR patch 1 patch 1 patch Every 24 Hours Scheduled 6/12/2018     Sig - Route: Place 1 patch on the skin Daily. - Transdermal    ondansetron (ZOFRAN) injection 4 mg 4 mg Every 6 Hours PRN 6/4/2018     Sig - Route: Infuse 2 mL into a venous catheter Every 6 (Six) Hours As Needed for Nausea or Vomiting. - Intravenous    pantoprazole (PROTONIX) EC tablet 40 mg 40 mg Every Early Morning 6/6/2018     Sig - Route: Take 1 tablet by mouth Every Morning. - Oral    Pharmacy Consult - [facility]  Daily 5/29/2018     Sig - Route: Daily. - Does not apply    polyethylene glycol 3350 powder (packet) 17 g Daily 6/1/2018     Sig - Route: Take 17 g by mouth Daily. - Oral    sennosides-docusate sodium (SENOKOT-S) 8.6-50 MG tablet 2 tablet 2 tablet 2 Times Daily 6/4/2018     Sig - Route: Take 2 tablets by mouth 2 (Two) Times a Day. - Oral    sodium chloride 0.9 % flush 1-10 mL 1-10 mL As Needed 6/11/2018     Sig - Route: Infuse 1-10 mL into a venous catheter As Needed for Line Care. - Intravenous    metoprolol tartrate (LOPRESSOR) half tablet 12.5 mg (Discontinued) 12.5 mg Every 12 Hours Scheduled 6/5/2018 6/12/2018    Sig - Route: Take 1 half tablet by mouth Every 12 (Twelve) Hours. - Oral             Operative/Procedure Notes (most recent note)      Ramesh Leggett MD at 6/4/2018  8:08 AM        Operative Report  Paulie Jordan  [id number]  1980    Preop Diagnosis: Two-vessel coronary artery disease, status post myocardial infarction        Postop Diagnosis same        Procedure: Coronary artery bypass graft ×2 with EVH.   saphenous vein graft to circumflex and left internal mammary artery to LAD        Surgeons: Ramesh Leggett        Assistant: Ki canela        Operative Findings:         Description: Patient was brought to the operating room placed under general anesthesia.  Patient placement of Monroe-Madisyn catheter,  arterial line, Hair catheter.  The patient was sterilely prepped and draped.  Vein was harvested from the right leg from below the knee to groin using EVH technique.  Incision was then made in the left leg as well as open technique at both ankles and were unable to obtained a reasonable vein.  Simultaneously median sternotomy incision was made.  Left internal mammary artery taken down about distally.  The patient was heparinized and activating clotting times confirmed be adequate.  Pericardium was opened stay sutures were placed to Ethilon sutures and placed in ascending aorta and right atrial appendage.  Patient was cannulated arterial venous cannula.  Cardiopulmonary bypass was initiated and the orders crossclamped.  The heart was elevated.  The circumflex vessels opened sharply extended proximally and distally.  An end-to-side anastomosis running several point sutures constructed was tied down.  The LAD is open distally.  Left internal mammary was artery was brought through pericardial tunnel.  It was anastomosed to this with a running 7-0 Prolene suture.  A single aortotomies then made the proximal anastomoses was carried out running 6-0 Prolene suture.  Prior to tying this down hot shot of cardioplegia given.  Aortic cross-clamp was removed.  The patient was warmed and weaned from cardiopulmonary bypass in standard fashion for mucinous Oswaldo heparin decannulation was carried out.  A singly sound tube was placed.  A left chest tube was placed.  Sternum was reapproximated #7 wire.  The linea alba was closed with #1 Ethibond sutures.  Subcutaneous tissues were closed with 0 Vicryl, 2-0 Vicryl, 3-0 Vicryl, 3-0 Monocryl subcuticular stitch.  Cardiopulmonary bypass 62 minutes and cross-clamp 54 minutes.        EBL: 400 cc      Please note that portions of this note were completed with a voice recognition program. Efforts were made to edit the dictations, but words may be mistranscribed      Ramesh Leggett,  "MD  06/04/18 11:12 AM    Electronically signed by Ramesh Leggett MD at 6/4/2018 11:17 AM          Physician Progress Notes (most recent note)      Devorah Hightower MD at 6/12/2018  9:31 AM          [facility]  IP Progress Note      Chief Complaint: Follow-up of CAD/non-STEMI/CM    Subjective:  States that he gets short of breath with activity and heart rate increases, continues to complain of sternal pain.  No nausea or vomiting.  Did work with physical therapy yesterday.    Objective:  Blood pressure 112/68, pulse 72, temperature 98.1 °F (36.7 °C), resp. rate 18, height 182.9 cm (72.01\"), weight (!) 139 kg (307 lb 8 oz), SpO2 96 %.     Intake/Output Summary (Last 24 hours) at 06/12/18 0932  Last data filed at 06/12/18 0822   Gross per 24 hour   Intake             3400 ml   Output              650 ml   Net             2750 ml     Physical Exam:  General: No apparent distress  Neck: no JVD.  Chest:No respiratory distress, breath sounds are normal. No wheezes,  rhonchi or rales.  Cardiovascular: Normal S1 and S2, no murmer, gallop or rub.    Extremities: No edema.    Results Review:     I reviewed the patient's new clinical results.      Results from last 7 days  Lab Units 06/10/18  0612  06/08/18  0518   WBC 10*3/mm3  --   --  8.49   HEMOGLOBIN g/dL 7.6*  < > 7.4*   HEMATOCRIT % 24.6*  < > 23.8*   PLATELETS 10*3/mm3  --   --  183   < > = values in this interval not displayed.    Results from last 7 days  Lab Units 06/10/18  0612   SODIUM mmol/L 136   POTASSIUM mmol/L 5.3   CHLORIDE mmol/L 105   CO2 mmol/L 23.0   BUN mg/dL 29*   CREATININE mg/dL 1.46*   CALCIUM mg/dL 8.6*   GLUCOSE mg/dL 92       Results from last 7 days  Lab Units 06/10/18  0612   SODIUM mmol/L 136   POTASSIUM mmol/L 5.3   CHLORIDE mmol/L 105   CO2 mmol/L 23.0   BUN mg/dL 29*   CREATININE mg/dL 1.46*   GLUCOSE mg/dL 92   CALCIUM mg/dL 8.6*         Lab Results   Component Value Date    CKTOTAL 132 03/16/2018    " CKMB 11.03 (C) 03/16/2018    CKMBINDEX 8.4 (H) 03/16/2018    TROPONINI 18.261 (C) 05/29/2018                   Tele: Sinus Rythym          Assessment and Plan:   1. CAD with recurrent NSTEMI  - Echo EF 40%  - s/p 2 vessel CABG today 6/4/18  per Dr. Leggett   - hemodynamically stable, currently NSR, off all vasopressors.  - Continue current management.  2. HLD  3. Tobacco abuse, Cessation discussed.  4. KENNETH, stable, continue to monitor.  5. Chronic pain, acute exacerbation post CABG.  Further pain management per ICU team/CT surgery  6.  Post CABG anemia.     Blood pressure too low for ACE ihibitor/ARB/Entresto.  Started on low-dose metoprolol, tolerating well, will increase dose to 25 mg every 12 hours.  Needs comprehensive physical therapy, probable discharge to a rehabilitation facility soon, okay any time from cardiology standpoint.   Nothing more to add, I will sign off, please call us if there are any questions or concerns.  We will schedule him for an outpatient cardiology follow-up visit at [facility] in 4-6 weeks.    Devorah Hightower MD, [facility], [facility]                                    Electronically signed by Devorah Hightower MD at 6/12/2018 10:10 AM          Consult Notes (most recent note)      Ramesh Leggett MD at 5/29/2018  2:30 PM          Consult Note  Paulie MOISES Jordan  [id number]  1980    Referring Provider:  Reason for Consultation: Coronary artery disease, myocardial infarction    Patient Care Team:  Cesar Neri MD as PCP - General  Cesar Neri MD as PCP - Family Medicine  Cesar Neri MD as PCP - Claims Attributed  Ernestine Barajas, RN as Care Coordinator (Population Health)    Chief complaint: Chest pain      History of present illness: I have been asked to see this patient at request of Dr. escoto.  The patient has a recent history of the last several days of chest pain and discomfort.  He has presented to the [place] emergency room.  He had an elevated troponin consistent with  myocardial infarction.  He essentially been transferred to [facility] for further evaluation.  He is been seen here before and said placement of ramus circumflex as well as LAD stents.  He underwent cardiac catheterization today by Dr. Lynch and was found to have complete occlusion of his LAD.  After she was successful and then catheter-based intervention to open this up.  I been asked see the patient guards possible coronary bypass surgery.  The patient has been on Brilinta and Plavix.  Patient currently is pain free.    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   , Past Surgical History:   Procedure Laterality Date   • BONE MARROW TRANSPLANT     • CARDIAC CATHETERIZATION N/A 8/22/2016    Procedure: Left Heart Cath;  Surgeon: Devorah Hightower MD;  Location:  [facility];  Service:    • CARDIAC CATHETERIZATION N/A 9/23/2017    Procedure: Left Heart Cath;  Surgeon: Devorah Hightower MD;  Location:  [facility];  Service:    • CARDIAC CATHETERIZATION N/A 3/16/2018    Procedure: Left Heart Cath;  Surgeon: Bryon Munguia MD;  Location:  [facility];  Service: Cardiology   • CORONARY STENT PLACEMENT     , Family History   Problem Relation Age of Onset   • Hypertension Father    • Cancer Father         Colon   • Arthritis Mother    • No Known Problems Sister    • Stroke Maternal Grandmother    • Heart attack Maternal Grandmother    , Social History   Substance Use Topics   • Smoking status: Never  Smoker   • Smokeless tobacco: Current User     Types: Chew   • Alcohol use No   , Prescriptions Prior to Admission   Medication Sig Dispense Refill Last Dose   • rosuvastatin (CRESTOR) 20 MG tablet Take 40 mg by mouth Every Night.      • aspirin 81 MG tablet Take 1 tablet by mouth Daily. 30 tablet 11 5/28/2018 at Unknown time   • buPROPion XL (WELLBUTRIN XL) 300 MG 24 hr tablet Take 300 mg by mouth Daily.   5/28/2018 at Unknown time   • carvedilol (COREG) 6.25 MG tablet Take 6.25 mg by mouth 2 (Two) Times a Day.   5/28/2018 at Unknown time   • citalopram (CeleXA) 40 MG tablet Take 40 mg by mouth daily.   5/28/2018 at Unknown time   • diazePAM (VALIUM) 10 MG tablet Take 10 mg by mouth At Night As Needed for Sedation.   5/27/2018 at Unknown time   • levothyroxine (SYNTHROID, LEVOTHROID) 50 MCG tablet Take 50 mcg by mouth daily.   5/28/2018 at Unknown time   • morphine (MS CONTIN) 30 MG 12 hr tablet Take 30 mg by mouth 2 (Two) Times a Day.   5/28/2018 at Unknown time   • nitroglycerin (NITROSTAT) 0.4 MG SL tablet Place 0.4 mg under the tongue Every 5 (Five) Minutes As Needed for Chest Pain. Take no more than 3 doses in 15 minutes.   5/28/2018 at Unknown time   • olopatadine (PATANOL) 0.1 % ophthalmic solution Administer 1 drop to both eyes Daily As Needed for Allergies.   Past Week at Unknown time   • omeprazole (PriLOSEC) 20 MG capsule Take 20 mg by mouth daily.   5/28/2018 at Unknown time   • ticagrelor (BRILINTA) 90 MG tablet tablet Take 1 tablet by mouth 2 (Two) Times a Day. 60 tablet 11 5/28/2018 at Unknown time      Scheduled Meds:    aspirin 81 mg Oral Daily   atorvastatin 80 mg Oral Nightly   buPROPion  mg Oral Daily   citalopram 40 mg Oral Daily   levothyroxine 50 mcg Oral Q AM   pantoprazole 40 mg Oral Q AM   pharmacy consult - MTM  Does not apply Daily      Continuous Infusions:    eptifibatide 2 mcg/kg/min Last Rate: 2 mcg/kg/min (05/29/18 1222)   heparin (porcine) 5 Units/kg/hr    nitroglycerin 5-200  "mcg/min Last Rate: Stopped (05/29/18 0559)   Pharmacy to Dose Heparin     sodium chloride 100 mL/hr Last Rate: 100 mL/hr (05/29/18 1241)      PRN Meds:  •  acetaminophen  •  diazePAM  •  HYDROmorphone  •  Pharmacy to Dose Heparin  •  sodium chloride and Allergies:  Benadryl [diphenhydramine]    Objective     Vital Sign Min/Max for last 24 hours  Temp  Min: 97.8 °F (36.6 °C)  Max: 98.5 °F (36.9 °C)   BP  Min: 87/61  Max: 130/82   Pulse  Min: 71  Max: 101   Resp  Min: 16  Max: 18   SpO2  Min: 75 %  Max: 100 %   No Data Recorded   Weight  Min: 129 kg (284 lb)  Max: 129 kg (284 lb)     Flowsheet Rows      First Filed Value   Admission Height  182.9 cm (72\") Documented at 05/29/2018 0100   Admission Weight  129 kg (284 lb) Documented at 05/29/2018 0100          Physical Exam:     General Appearance:    Alert, cooperative, in no acute distress   Head:    Normocephalic, without obvious abnormality, atraumatic   Eyes:            Lids and lashes normal, conjunctivae and sclerae normal, no   icterus, no pallor, corneas clear, PERRLA   Ears:    Ears appear intact with no abnormalities noted   Throat:   No oral lesions, no thrush, oral mucosa moist   Neck:   No adenopathy, supple, trachea midline, no thyromegaly, no   carotid bruit, no JVD   Back:     No kyphosis present, no scoliosis present, no skin lesions,      erythema or scars, no tenderness to percussion or                   palpation,   range of motion normal   Lungs:     Clear to auscultation,respirations regular, even and                  unlabored    Heart:    Regular rhythm and normal rate, normal S1 and S2, no            murmur, no gallop, no rub, no click   Chest Wall:    No abnormalities observed   Abdomen:     Normal bowel sounds, no masses, no organomegaly, soft        non-tender, non-distended, no guarding, no rebound                tenderness   Rectal:     Deferred   Extremities:   Moves all extremities well, no edema, no cyanosis, no             redness "   Pulses:   Pulses palpable and equal bilaterally   Skin:   No bleeding, bruising or rash   Lymph nodes:   No palpable adenopathy   Neurologic:   Cranial nerves 2 - 12 grossly intact, sensation intact, DTR       present and equal bilaterally             Assessment/Plan     Principal Problem:    NSTEMI (non-ST elevated myocardial infarction)  Active Problems:    Hypertension    Coronary artery disease involving native coronary artery of native heart    Dyslipidemia    History of leukemia    Neuropathy due to chemotherapeutic drug    Tobacco abuse    Obesity    KENNETH (acute kidney injury)      The patient is a very complex 37-year-old white male whose had a long history of coronary artery disease with previous stenting noted in the past.  The patient is also had a very complex history of leukemia and basically is barely able to stand because of his neuropathy and disability secondary to his leukemia and treatment.  He has a history of tobacco abuse and dyslipidemia and hypertension.  He is also history of obesity and acute kidney injury.  The patient on cardiac catheterization today was noted to have complete occlusion of the LAD.  With catheter-based intervention DR Triana was successful in getting this opened up.  I have been asked to see the patient in regards to possible surgery of the circumflex as well as the LAD bypass surgery.  I have obtain reviewed the cardiac catheterization films.  The patient had occlusion occlusion of the LAD.  The patient with catheter-based intervention and has now opened this up but it certainly is disease.  The circumflex also has significant disease.  After long discussion with Dr. Galvan and careful evaluation and treatment examination of the patient I do believe that coronary bypass surgery while for the patient the best long-term results.  Of discussed the operation, risks, and alternatives in detail.  Of discussed risk which includes rest of his life, bleeding, stroke, infection,  organ failure, and other risk factors walls alternatives.  He appears to understand all this and desires to proceed.  The patient has received both Brilinta as well as Plavix.  We'll certainly need to check a P2 Y 12 regards to the platelet function to see the exact timing of this.  Like thank you for this consultation.    I discussed the patients findings and my recommendations with patient, nursing staff and consulting provider      Please note that portions of this note were completed with a voice recognition program. Efforts were made to edit the dictations, but words may be mistranscribed    Ramesh Leggett MD  18  2:30 PM                Electronically signed by Ramesh Leggett MD at 2018  2:35 PM          Physical Therapy Notes (most recent note)      Darlene Chang PT at 2018 10:55 AM  Version 1 of 1         Acute Care - Physical Therapy Treatment Note   [place]     Patient Name: Paulie Jordan  : 1980  MRN: [id number]  Today's Date: 2018  Onset of Illness/Injury or Date of Surgery: 18  Date of Referral to PT: 18  Referring Physician: ROBBIN Escalante    Admit Date: 2018    Visit Dx:    ICD-10-CM ICD-9-CM   1. Coronary artery disease involving native coronary artery of native heart with angina pectoris I25.119 414.01     413.9   2. NSTEMI (non-ST elevated myocardial infarction) I21.4 410.70   3. Impaired functional mobility, balance, gait, and endurance Z74.09 V49.89     Patient Active Problem List   Diagnosis   • Hypertension   • CAD with previous history of stents. Most recent stent 3/16/18   • Dyslipidemia   • History of leukemia   • Neuropathy due to chemotherapeutic drug. 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  -LM      Care Plan Review patient/other agree to care plan  -LM      Therapy Frequency (PT Clinical Impression) daily  -LM      Patient Effort good  -LM      Existing Precautions/Restrictions fall;sternal;cardiac  -LM      Recorded by [LM] Darlene Chang, PT 06/11/18 1053      Row Name 06/11/18 1012             Vital Signs    Pre SpO2 (%) 100  -LM      O2 Delivery Pre Treatment room air  -LM      O2 Delivery Intra Treatment room air  -LM      Post SpO2 (%) 98  -LM      O2 Delivery Post Treatment room air  -LM      Pre Patient Position Sitting  -LM      Intra Patient Position Standing  -LM      Post Patient Position Sitting  -LM      Recorded by [LM] Darlene Chang, PT 06/11/18 1053      Row Name 06/11/18 1012             Cognitive Assessment/Intervention    Additional Documentation Cognitive Assessment/Intervention (Group)  -LM      Recorded by [LM] Darlene Chang, PT 06/11/18 1053      Row Name 06/11/18 1012             Cognitive Assessment/Intervention- PT/OT    Affect/Mental Status (Cognitive) WFL  -LM      Orientation Status (Cognition) oriented x 4  -LM      Follows Commands (Cognition) over 90% accuracy;follows multi-step commands  -LM      Safety Deficit (Cognitive) mild deficit  -LM      Recorded by [LM] Darlene Chang, PT 06/11/18 1053      Row Name 06/11/18 1012             Safety Issues, Functional Mobility    Safety Issues Affecting Function (Mobility) awareness of need for assistance;safety precaution awareness;safety precautions follow-through/compliance  -LM      Impairments Affecting Function (Mobility) balance;coordination;endurance/activity tolerance;pain;shortness of  breath;strength  -LM      Recorded by [LM] Darlene Chang, PT 06/11/18 1053      Row Name 06/11/18 1012             Bed Mobility Assessment/Treatment    Comment (Bed Mobility) Pt received and left UIC  -LM      Recorded by [LM] Darlene K Charlene, PT 06/11/18 1053      Row Name 06/11/18 1012             Transfer Assessment/Treatment    Transfer Assessment/Treatment sit-stand transfer;stand-sit transfer  -LM      Comment (Transfers) STSx3. With first rep, unable to clear bottom from chair. Second rep, able to minimally clear bottom from surface. With third rep, improved to partial stand. Required blocking of bilateral feet and knees during stand to prevent buckling and sliding. Pt limited by weakness and fatigue.  -LM      Sit-Stand [place] (Transfers) maximum assist (25% patient effort);2 person assist;verbal cues  -LM      Stand-Sit [place] (Transfers) maximum assist (25% patient effort);2 person assist;verbal cues  -LM      Recorded by [LM] Darlene K Charlene, PT 06/11/18 1053      Row Name 06/11/18 1012             Sit-Stand Transfer    Assistive Device (Sit-Stand Transfers) other (see comments)   none  -LM      Recorded by [LM] Darlene TRIPP Charlene, PT 06/11/18 1053      Row Name 06/11/18 1012             Stand-Sit Transfer    Assistive Device (Stand-Sit Transfers) other (see comments)   none  -LM      Recorded by [LM] Darlene K Charlene, PT 06/11/18 1053      Row Name 06/11/18 1012             Gait/Stairs Assessment/Training    [place] Level (Gait) unable to assess  -LM      Comment (Gait/Stairs) Pt non-ambulatory at baseline.  -LM      Recorded by [LM] Darlene K Charlene, PT 06/11/18 1053      Row Name 06/11/18 1012             Therapeutic Exercise    Lower Extremity (Therapeutic Exercise) gluteal sets;SLR (straight leg raise), bilateral;quad sets, bilateral  -LM      Lower Extremity Range of Motion (Therapeutic Exercise) ankle dorsiflexion/plantar flexion, bilateral  -LM      Position (Therapeutic Exercise)  seated  -LM      Sets/Reps (Therapeutic Exercise) x10 reps each  -LM      Comment (Therapeutic Exercise) Samantha ankle pumps (bilateral foot drop at baseline)  -LM      Recorded by [LM] Darlene Chang, PT 06/11/18 1053      Row Name 06/11/18 1012             Positioning and Restraints    Pre-Treatment Position sitting in chair/recliner  -LM      Post Treatment Position chair  -LM      In Chair notified nsg;reclined;sitting;call light within reach;encouraged to call for assist;with family/caregiver;legs elevated  -LM      Recorded by [LM] Darlene Chang, PT 06/11/18 1053      Row Name 06/11/18 1012             Pain Assessment    Additional Documentation Pain Scale: FACES Pre/Post-Treatment (Group)  -LM      Recorded by [LM] Darlene Chang, PT 06/11/18 1053      Row Name 06/11/18 1012             Pain Scale: Numbers Pre/Post-Treatment    Pain Location - Orientation generalized  -LM      Pain Location chest   incision  -LM      Recorded by [LM] Darlene Chang, PT 06/11/18 1053      Row Name 06/11/18 1012             Pain Scale: FACES Pre/Post-Treatment    Pain: FACES Scale, Pretreatment 4-->hurts little more  -LM      Pain: FACES Scale, Post-Treatment 4-->hurts little more  -LM      Recorded by [LM] Darlene Chang, PT 06/11/18 1053      Row Name                Wound 06/04/18 0826 chest incision    Wound - Properties Group Date first assessed: 06/04/18 [SH] Time first assessed: 0826 [SH] Location: chest [SH] Type: incision [SH] Recorded by:  [SH] Sherri L Haase, RN 06/04/18 0826    Row Name                Wound 06/04/18 0917 Right leg incision    Wound - Properties Group Date first assessed: 06/04/18 [SH] Time first assessed: 0917 [SH] Side: Right [SH] Location: leg [SH] Type: incision [SH] Recorded by:  [SH] Sherri L Haase, RN 06/04/18 0917    Row Name                Wound 06/04/18 1454 Left surgical    Wound - Properties Group Date first assessed: 06/04/18 [JOHNNY] Time first assessed: 1454 [JOHNNY] Side: Left [JOHNNY] Type:  surgical [JOHNNY] Recorded by:  [JOHNNY] Rajiv Bryson RN 06/04/18 1454    Row Name 06/11/18 1012             Plan of Care Review    Plan of Care Reviewed With patient;father  -LM      Recorded by [LM] Darlene Chang, PT 06/11/18 1053      Row Name 06/11/18 1012             Outcome Summary/Treatment Plan (PT)    Daily Summary of Progress (PT) progress toward functional goals is good  -LM      Recorded by [LM] Darlene Chang, PT 06/11/18 1053        User Key  (r) = Recorded By, (t) = Taken By, (c) = Cosigned By    Initials Name Effective Dates Discipline     Sherri L Haase, RN 06/16/16 -  Nurse    JOHNNY Bryson RN 06/16/16 -  Nurse    AMIRA Chang, PT 04/03/18 -  PT          Wound 06/04/18 0826 chest incision (Active)   Dressing Appearance open to air 6/10/2018  8:00 PM   Closure Liquid skin adhesive 6/10/2018  8:00 PM   Periwound intact;dry 6/10/2018  8:00 PM   Periwound Temperature warm 6/10/2018  8:00 PM   Drainage Amount none 6/10/2018  8:00 PM       Wound 06/04/18 0917 Right leg incision (Active)   Dressing Appearance open to air 6/10/2018  8:00 PM   Closure Staples 6/10/2018  8:00 PM   Periwound dry;intact 6/10/2018  8:00 PM   Drainage Amount none 6/10/2018  8:00 PM       Wound 06/04/18 1454 Left surgical (Active)   Dressing Appearance open to air 6/10/2018  8:00 PM   Closure Staples 6/10/2018  8:00 PM   Drainage Amount none 6/10/2018  8:00 PM             Physical Therapy Education     Title: PT OT SLP Therapies (Active)     Topic: Physical Therapy (Active)     Point: Mobility training (Active)    Learning Progress Summary     Learner Status Readiness Method Response Comment Documented by    Patient Done Acceptance E,D VU,NR Reviewed sternal precautions, benefits of activity, HEP. LM 06/11/18 1053     Active Acceptance E,D NR  CM 06/08/18 1040     Active Acceptance E NR  KR 06/07/18 1128     Active Acceptance E NR  KR 06/06/18 1138     Active Acceptance E,D NR  JOHNNY 06/05/18 1013    Family Done  Acceptance E,D VU,NR Reviewed sternal precautions, benefits of activity, HEP.  06/11/18 1053    Father Active Acceptance E,D NR   06/05/18 1013          Point: Home exercise program (Active)    Learning Progress Summary     Learner Status Readiness Method Response Comment Documented by    Patient Done Acceptance E,D VU,NR Reviewed sternal precautions, benefits of activity, HEP.  06/11/18 1053     Active Acceptance E,D NR   06/08/18 1040     Active Acceptance E NR   06/07/18 1128     Active Acceptance E NR   06/06/18 1138     Active Acceptance E,D NR   06/05/18 1013    Family Done Acceptance E,D VU,NR Reviewed sternal precautions, benefits of activity, HEP.  06/11/18 1053    Father Active Acceptance E,D NR   06/05/18 1013          Point: Body mechanics (Active)    Learning Progress Summary     Learner Status Readiness Method Response Comment Documented by    Patient Done Acceptance E,D VU,NR Reviewed sternal precautions, benefits of activity, HEP.  06/11/18 1053     Active Acceptance E,D NR   06/08/18 1040     Active Acceptance E NR   06/07/18 1128     Active Acceptance E NR  KR 06/06/18 1138     Active Acceptance E,D NR   06/05/18 1013    Family Done Acceptance E,D VU,NR Reviewed sternal precautions, benefits of activity, HEP.  06/11/18 1053    Father Active Acceptance E,D NR   06/05/18 1013          Point: Precautions (Active)    Learning Progress Summary     Learner Status Readiness Method Response Comment Documented by    Patient Done Acceptance E,D VU,NR Reviewed sternal precautions, benefits of activity, HEP.  06/11/18 1053     Active Acceptance E,D NR   06/08/18 1040     Active Acceptance E NR  KR 06/07/18 1128     Active Acceptance E NR  KR 06/06/18 1138     Active Acceptance E,D NR   06/05/18 1013    Family Done Acceptance E,D VU,NR Reviewed sternal precautions, benefits of activity, HEP.  06/11/18 1053    Father Active Acceptance E,D NR   06/05/18 1013                       User Key     Initials Effective Dates Name Provider Type Discipline    KR 04/03/18 -  Yennifer Patel, PT Physical Therapist PT    LM 04/03/18 -  Darlene Chang, PT Physical Therapist PT    JOHNNY 05/15/18 -  Ki Workman, PT Student PT Student PT    CM 06/07/18 -  Heaven Ayala, PT Student PT Student PT                    PT Recommendation and Plan  Therapy Frequency (PT Clinical Impression): daily  Outcome Summary/Treatment Plan (PT)  Daily Summary of Progress (PT): progress toward functional goals is good  Plan of Care Reviewed With: patient, father  Outcome Summary: Pt transferred sit<>stand x3 reps; gradually improving with each repetition to reach partial stand on 3rd rep. Pt required maxAx2 and blocking of knees to complete stand. Will continue to progress with mobility as able.           Outcome Measures     Row Name 06/11/18 1012             How much help from another person do you currently need...    Turning from your back to your side while in flat bed without using bedrails? 2  -LM      Moving from lying on back to sitting on the side of a flat bed without bedrails? 1  -LM      Moving to and from a bed to a chair (including a wheelchair)? 2  -LM      Standing up from a chair using your arms (e.g., wheelchair, bedside chair)? 2  -LM      Climbing 3-5 steps with a railing? 1  -LM      To walk in hospital room? 1  -LM      AM-PAC 6 Clicks Score 9  -LM         Functional Assessment    Outcome Measure Options AM-PAC 6 Clicks Basic Mobility (PT)  -LM        User Key  (r) = Recorded By, (t) = Taken By, (c) = Cosigned By    Initials Name Provider Type    LM Darlene Chang, PT Physical Therapist           Time Calculation:         PT Charges     Row Name 06/11/18 1054             Time Calculation    Start Time 1012  -LM      PT Received On 06/11/18  -LM      PT Goal Re-Cert Due Date 06/15/18  -LM         Time Calculation- PT    Total Timed Code Minutes- PT 17 minute(s)  -LM        User Key  (r) = Recorded  By, (t) = Taken By, (c) = Cosigned By    Initials Name Provider Type    LM Darlene Chang PT Physical Therapist          Therapy Charges for Today     Code Description Service Date Service Provider Modifiers Qty    29079917539 HC PT THER PROC EA 15 MIN 2018 Darlene Chang PT GP 1    61524926879 HC PT THER SUPP EA 15 MIN 2018 Darlene Chang PT GP 1          PT G-Codes  Outcome Measure Options: AM-PAC 6 Clicks Basic Mobility (PT)    Darlene Chang PT  2018         Electronically signed by Darlene Chang PT at 2018 10:55 AM          Occupational Therapy Notes (most recent note)      Darby Whipple, OT at 2018  1:57 PM          Acute Care - Occupational Therapy Initial Evaluation   [place]     Patient Name: Paulie Jordan  : 1980  MRN: [id number]  Today's Date: 2018  Onset of Illness/Injury or Date of Surgery: 18  Date of Referral to OT: 06/10/18  Referring Physician: MD Oleksandr    Admit Date: 2018       ICD-10-CM ICD-9-CM   1. Coronary artery disease involving native coronary artery of native heart with angina pectoris I25.119 414.01     413.9   2. NSTEMI (non-ST elevated myocardial infarction) I21.4 410.70   3. Impaired functional mobility, balance, gait, and endurance Z74.09 V49.89   4. Impaired mobility and ADLs Z74.09 799.89     Patient Active Problem List   Diagnosis   • Hypertension   • CAD with previous history of stents. Most recent stent 3/16/18   • Dyslipidemia   • History of leukemia   • Neuropathy due to chemotherapeutic drug. Wheelchair bound with chronic pain   • Tobacco abuse   • Obesity   • NSTEMI 18 s/p thrombectomy and PTCA   • KENNETH (acute kidney injury)   • Wheelchair bound   • S/P CABG x 2 on 18   • Chronic narcotic use     Past Medical History:   Diagnosis Date   • Chronic pain    • Gout    • Hypercholesterolemia    • Hypertension    • Leukemia     dx age 18 and relapsed age 19   • Leukemia    • MI, old     2016   • Nerve damage      due to leukemia in spinal fluid     Past Surgical History:   Procedure Laterality Date   • BONE MARROW TRANSPLANT     • CARDIAC CATHETERIZATION N/A 8/22/2016    Procedure: Left Heart Cath;  Surgeon: Devorah Hightower MD;  Location:  [facility];  Service:    • CARDIAC CATHETERIZATION N/A 9/23/2017    Procedure: Left Heart Cath;  Surgeon: Devorah Hightower MD;  Location:  [facility];  Service:    • CARDIAC CATHETERIZATION N/A 3/16/2018    Procedure: Left Heart Cath;  Surgeon: Bryon Munguia MD;  Location:  [facility];  Service: Cardiology   • CARDIAC CATHETERIZATION N/A 5/29/2018    Procedure: Left Heart Cath;  Surgeon: Bryon Triana MD;  Location:  [facility];  Service: Cardiovascular   • CORONARY ARTERY BYPASS GRAFT N/A 6/4/2018    Procedure: MEDIAN STERNOTOMY, CORONARY ARTERY BYPASS X  2 WITH INTERNAL MAMMARY ARTERY GRAFT, EVH OF THE RIGHT GREATER SAPHENOUS VEIN AND EXPLORATION OF THE LEFT LEG;  Surgeon: Ramesh Leggett MD;  Location:  [facility];  Service: Cardiothoracic   • CORONARY STENT PLACEMENT            OT ASSESSMENT FLOWSHEET (last 72 hours)      Occupational Therapy Evaluation     Row Name 06/11/18 1300 06/11/18 1115                OT Evaluation Time/Intention    Subjective Information  -- complains of;weakness;fatigue;pain  -HK       Document Type  -- evaluation  -HK       Mode of Treatment  -- individual therapy;occupational therapy  -       Patient Effort  -- adequate  -HK       Symptoms Noted During/After Treatment  -- fatigue  -HK          General Information    Patient Profile Reviewed?  -- yes  -       Onset of Illness/Injury or Date of Surgery  -- 05/29/18  -       Referring Physician  -- MD Oleksandr  -       Patient Observations  -- alert;cooperative;agree to therapy  -HK       Patient/Family Observations  -- family at bedside  -HK       General Observations of Patient  -- Pt received sitting upright in chair with all pads  underneath wet with urine.   -HK       Prior Level of Function  -- independent:;transfer;w/c or scooter;bed mobility;bathing;dressing  -HK       Equipment Currently Used at Home  -- wheelchair;wheelchair, motorized;rollator;cane, quad  -HK       Pertinent History of Current Functional Problem  -- Pt is a 38 YOM who presented to the ED on 5/29/18 with complaints of chest and BUE pain. Pt is s/p CABG x2 with EVH 6/4/18. Pt has OM[facility] of previous MI x2 and leukemia with secondary LE neuropathy.   -HK       Existing Precautions/Restrictions  -- fall;sternal;cardiac  -HK       Risks Reviewed  -- patient and family:;LOB;nausea/vomiting;dizziness;increased discomfort;change in vital signs;lines disloged;increased drainage  -HK       Benefits Reviewed  -- patient and family:;increase independence;improve function;increase strength;increase balance;decrease pain;decrease risk of DVT;improve skin integrity;increase knowledge  -HK       Barriers to Rehab  -- medically complex;previous functional deficit  -HK          Relationship/Environment    Lives With  -- alone  -HK          Resource/Environmental Concerns    [facility]  -          Home Main Entrance    Number of Stairs, Main Entrance  -- two  -HK       Stair Railings, Main Entrance  -- railings on both sides of stairs  -HK          Cognitive Assessment/Interventions    Additional Documentation  -- Cognitive Assessment/Intervention (Group)  -HK          Cognitive Assessment/Intervention- PT/OT    Affect/Mental Status (Cognitive)  -- [facility]  -       Orientation Status (Cognition)  -- oriented x 4  -HK       Follows Commands (Cognition)  -- [facility]  -       Cognitive Function (Cognitive)  -- safety deficit  -HK       Safety Deficit (Cognitive)  -- safety precautions follow-through/compliance;safety precautions awareness;insight into deficits/self awareness;awareness of need for assistance  -HK       Personal Safety Interventions  --  fall prevention program maintained;gait belt;nonskid shoes/slippers when out of bed  -HK          Bed Mobility Assessment/Treatment    Bed Mobility Assessment/Treatment  -- rolling left;rolling right  -HK       Rolling Left [place] (Bed Mobility)  -- maximum assist (25% patient effort);2 person assist;verbal cues  -HK       Rolling Right [place] (Bed Mobility)  -- maximum assist (25% patient effort);2 person assist;verbal cues  -HK       Comment (Bed Mobility)  -- Pt received UIC and transferred to bed via mechanical lift. Pt rolled L to R in chair for sling placement and removal of saturated ijeoma pads.    -HK          Functional Mobility    Functional Mobility- Ind. Level  -- unable to perform  -HK       Functional Mobility- Comment  -- pt declined any mobility or transfers complaining of fatigue after PT had come in.   -HK          Transfer Assessment/Treatment    Transfer Assessment/Treatment  -- bed-chair transfer  -HK       Comment (Transfers)  -- pt declined any mobility or transfers complaining of fatigue after PT had come in. Pt transferred from chair to bed. Pt rolled L to R in chair to place sling due to pt refusal to attempt transfer. Pt educated on sternal precautions and how to maintain.    -HK       Bed-Chair [place] (Transfers)  -- dependent (less than 25% patient effort);2 person assist  -HK       Assistive Device (Bed-Chair Transfers)  -- mechanical lift/aid  -HK          ADL Assessment/Intervention    BADL Assessment/Intervention  -- bathing;upper body dressing;toileting  -HK          Bathing Assessment/Intervention    Bathing [place] Level  -- supervision;chest/trunk;upper extremities;perineal area;maximum assist (25% patient effort);distal lower extremities/feet;proximal lower extremities;other (see comments)   back   -HK       Bathing Position  -- unsupported sitting  -HK       Comment (Bathing)  -- Pt washed upper body and sofi areas with supervision. Pt required maxA to  wash legs and back.   -          Upper Body Dressing Assessment/Training    Upper Body Dressing [place] Level  -- doff;don;other (see comments);maximum assist (25% patient effort)   gown  -HK       Upper Body Dressing Position  -- unsupported sitting  -       Comment (Upper Body Dressing)  -- OT assisted pt to dof/don gown  -          Toileting Assessment/Training    Comment (Toileting)  -- ijeoma pads saturated with urine on OT entry and pt did not know. Pt is incontinent with urine.   -          BADL Safety/Performance    Impairments, BADL Safety/Performance  -- balance;strength;pain;trunk/postural control;endurance/activity tolerance;range of motion  -          General ROM    RT Upper Ext  -- Rt Shoulder Flexion  -HK       LT Upper Ext  -- Lt Shoulder Flexion  -HK          Right Upper Ext    Rt Upper Extremity Comments   -- not assessed due to sternal precautions. Pt reports arms are very weak.   -HK          Left Upper Ext    Lt Upper Extremity Comments   -- not assessed due to sternal precautions. Pt reports arms are very weak.   -HK          MMT (Manual Muscle Testing)    Additional Documentation  -- General Assessment (Manual Muscle Testing) (Group)  -          General Assessment (Manual Muscle Testing)    General Manual Muscle Testing (MMT) Assessment  -- upper extremity strength deficits identified  -HK       Comment, General Manual Muscle Testing (MMT) Assessment  -- not assessed due to sternal precautions. Pt reports arms are very weak.   -HK          Upper Extremity (Manual Muscle Testing)    Comment, MMT: Upper Extremity  -- not assessed due to sternal precautions. Pt reports arms are very weak.   -          Motor Assessment/Interventions    Additional Documentation  -- Balance (Group)  -          Balance    Balance  -- static sitting balance;dynamic sitting balance  -          Static Sitting Balance    Level of [place] (Unsupported Sitting, Static Balance)  -- independent   -HK       Sitting Position (Unsupported Sitting, Static Balance)  -- sitting in chair  -HK       Time Able to Maintain Position (Unsupported Sitting, Static Balance)  -- more than 5 minutes  -HK          Dynamic Sitting Balance    Level of [place], Reaches Outside Midline (Sitting, Dynamic Balance)  -- supervision  -HK       Sitting Position, Reaches Outside Midline (Sitting, Dynamic Balance)  -- sitting in chair  -HK       Comment, Reaches Outside Midline (Sitting, Dynamic Balance)  -- bathing  -HK          Sensory Assessment/Intervention    Sensory General Assessment  -- no sensation deficits identified  -HK          Positioning and Restraints    Pre-Treatment Position  -- sitting in chair/recliner  -HK       Post Treatment Position  -- bed  -HK       In Bed  -- notified nsg;supine;call light within reach;encouraged to call for assist;exit alarm on;with family/caregiver   OT completed meal set up  -HK          Pain Assessment    Additional Documentation  -- Pain Scale: FACES Pre/Post-Treatment (Group)  -HK          Pain Scale: FACES Pre/Post-Treatment    Pain: FACES Scale, Pretreatment  -- 0-->no hurt  -HK       Pain: FACES Scale, Post-Treatment  -- 0-->no hurt  -HK          Wound 06/04/18 0826 chest incision    Wound - Properties Group Date first assessed: 06/04/18  -SH Time first assessed: 0826  -SH Location: chest  -SH Type: incision  -SH       Wound 06/04/18 0917 Right leg incision    Wound - Properties Group Date first assessed: 06/04/18  -SH Time first assessed: 0917  -SH Side: Right  -SH Location: leg  -SH Type: incision  -SH       Wound 06/04/18 1454 Left surgical    Wound - Properties Group Date first assessed: 06/04/18  -JOHNNY Time first assessed: 1454  -JOHNNY Side: Left  -JOHNNY Type: surgical  -JOHNNY       Plan of Care Review    Plan of Care Reviewed With  -- patient;family  -HK          Clinical Impression (OT)    Date of Referral to OT 06/10/18  -HK  --       OT Diagnosis Decreased independence with ADLS and  Mobility   -HK  --       Patient/Family Goals Statement (OT Eval) Pt would like to improve and go to rehab prior to returning home  -HK  --       Criteria for Skilled Therapeutic Interventions Met (OT Eval) yes;treatment indicated  -HK  --       Rehab Potential (OT Eval) good, to achieve stated therapy goals  -HK  --       Therapy Frequency (OT Eval) daily  -HK  --       Care Plan Review (OT) evaluation/treatment results reviewed;care plan/treatment goals reviewed;patient/other agree to care plan  -HK  --       Care Plan Review, Other Participant (OT Eval) family  -HK  --       Anticipated Discharge Disposition (OT) inpatient rehabilitation facility  -HK  --          Vital Signs    Pre Systolic BP Rehab --   RN cleared for tx.   -HK  --       Pre Patient Position Sitting  -HK  --       Intra Patient Position Supine  -HK  --       Post Patient Position Supine  -HK  --          OT Goals    Bed Mobility Goal Selection (OT) bed mobility, OT goal 1  -HK  --       Transfer Goal Selection (OT) transfer, OT goal 1  -HK  --       Dressing Goal Selection (OT) dressing, OT goal 1  -HK  --       Toileting Goal Selection (OT) toileting, OT goal 1  -HK  --          Bed Mobility Goal 1 (OT)    Activity/Assistive Device (Bed Mobility Goal 1, OT) scooting;sit to supine/supine to sit  -HK  --       [place] Level/Cues Needed (Bed Mobility Goal 1, OT) moderate assist (50-74% patient effort);verbal cues required  -HK  --       Time Frame (Bed Mobility Goal 1, OT) 5 days  -HK  --       Progress/Outcomes (Bed Mobility Goal 1, OT) goal ongoing  -HK  --          Transfer Goal 1 (OT)    Activity/Assistive Device (Transfer Goal 1, OT) bed-to-chair/chair-to-bed  -HK  --       [place] Level/Cues Needed (Transfer Goal 1, OT) moderate assist (50-74% patient effort);verbal cues required  -HK  --       Time Frame (Transfer Goal 1, OT) 5 days  -HK  --       Progress/Outcome (Transfer Goal 1, OT) goal ongoing  -HK  --          Dressing Goal  1 (OT)    Activity/Assistive Device (Dressing Goal 1, OT) lower body dressing  -HK  --       [place]/Cues Needed (Dressing Goal 1, OT) moderate assist (50-74% patient effort);verbal cues required  -HK  --       Time Frame (Dressing Goal 1, OT) 5 days  -HK  --       Progress/Outcome (Dressing Goal 1, OT) goal ongoing  -HK  --          Toileting Goal 1 (OT)    Activity/Device (Toileting Goal 1, OT) commode, bedside without drop arms;toileting skills, all  -HK  --       [place] Level/Cues Needed (Toileting Goal 1, OT) moderate assist (50-74% patient effort);verbal cues required  -HK  --       Time Frame (Toileting Goal 1, OT) 5 days  -HK  --       Progress/Outcome (Toileting Goal 1, OT) goal ongoing  -HK  --          Living Environment    Home Accessibility  -- tub/shower is not walk in;stairs to enter home  -HK         User Key  (r) = Recorded By, (t) = Taken By, (c) = Cosigned By    Initials Name Effective Dates     Sherri L Haase, RN 06/16/16 -     JOHNNY Bryson RN 06/16/16 -     HK Darby Whipple, OT 03/07/18 -            Occupational Therapy Education     Title: PT OT SLP Therapies (Active)     Topic: Occupational Therapy (Active)     Point: ADL training (Done)     Description: Instruct learner(s) on proper safety adaptation and remediation techniques during self care or transfers.   Instruct in proper use of assistive devices.   Learning Progress Summary     Learner Status Readiness Method Response Comment Documented by    Patient Done Acceptance E VU pt educated on ADL retraining with bathing, safety precautions, and appropriate body mechanics.  06/11/18 1354          Point: Precautions (Done)     Description: Instruct learner(s) on prescribed precautions during self-care and functional transfers.   Learning Progress Summary     Learner Status Readiness Method Response Comment Documented by    Patient Done Acceptance E VU pt educated on ADL retraining with bathing, safety precautions, and  appropriate body mechanics.  06/11/18 1354          Point: Body mechanics (Done)     Description: Instruct learner(s) on proper positioning and spine alignment during self-care, functional mobility activities and/or exercises.   Learning Progress Summary     Learner Status Readiness Method Response Comment Documented by    Patient Done Acceptance E VU pt educated on ADL retraining with bathing, safety precautions, and appropriate body mechanics.  06/11/18 1354                      User Key     Initials Effective Dates Name Provider Type Discipline     03/07/18 -  Darby Whipple, OT Occupational Therapist OT                  OT Recommendation and Plan  Outcome Summary/Treatment Plan (OT)  Anticipated Discharge Disposition (OT): inpatient rehabilitation facility  Therapy Frequency (OT Eval): daily  Plan of Care Review  Plan of Care Reviewed With: patient  Plan of Care Reviewed With: patient  Outcome Summary: OT eval complete. Pt declined to compelte sit to stand or chair to bed transfer without lift. Pt completed bath sitting in chair with maxA to wash back and legs. Pt rolled L to R with maxA to place sling and transferred back to bed via mechanical lift. Recommend SNF rehab.           Outcome Measures     Row Name 06/11/18 1115 06/11/18 1012          How much help from another person do you currently need...    Turning from your back to your side while in flat bed without using bedrails?  -- 2  -LM     Moving from lying on back to sitting on the side of a flat bed without bedrails?  -- 1  -LM     Moving to and from a bed to a chair (including a wheelchair)?  -- 2  -LM     Standing up from a chair using your arms (e.g., wheelchair, bedside chair)?  -- 2  -LM     Climbing 3-5 steps with a railing?  -- 1  -LM     To walk in hospital room?  -- 1  -LM     AM-PAC 6 Clicks Score  -- 9  -LM        How much help from another is currently needed...    Putting on and taking off regular lower body clothing? 1  -HK  --      Bathing (including washing, rinsing, and drying) 2  -HK  --     Toileting (which includes using toilet bed pan or urinal) 1  -HK  --     Putting on and taking off regular upper body clothing 2  -HK  --     Taking care of personal grooming (such as brushing teeth) 3  -HK  --     Eating meals 4  -HK  --     Score 13  -HK  --        Functional Assessment    Outcome Measure Options AM-PAC 6 Clicks Daily Activity (OT)  -HK AM-PAC 6 Clicks Basic Mobility (PT)  -       User Key  (r) = Recorded By, (t) = Taken By, (c) = Cosigned By    Initials Name Provider Type    AMIRA Chang, PT Physical Therapist     Darby Whipple OT Occupational Therapist          Time Calculation:   OT Start Time: 1115    Therapy Charges for Today     Code Description Service Date Service Provider Modifiers Qty    39779578040  OT EVAL MOD COMPLEXITY 4 6/11/2018 Darby Whipple OT GO 1    [id number]  OT THER SUPP EA 15 MIN 6/11/2018 Darby Whipple OT GO 2               Darby Whipple OT  6/11/2018    Electronically signed by Darby Whipple OT at 6/11/2018  1:57 PM

## 2018-06-12 NOTE — PROGRESS NOTES
"Paulie Jordan  [id number]  1980     LOS: 14 days   Patient Care Team:  Cesar Neri MD as PCP - General  Cesar Neri MD as PCP - Family Medicine  Cesar Neri MD as PCP - Claims Attributed  Ernestine Barajas, RN as Care Coordinator ([facility])    Chief Complaint: Coronary artery disease      Subjective: Incisional pain    Objective:     Vital Sign Min/Max for last 24 hours  Temp  Min: 98.2 °F (36.8 °C)  Max: 98.7 °F (37.1 °C)   BP  Min: 97/62  Max: 113/65   Pulse  Min: 63  Max: 81   Resp  Min: 15  Max: 20   SpO2  Min: 93 %  Max: 100 %   No Data Recorded   Weight  Min: 139 kg (307 lb 8 oz)  Max: 139 kg (307 lb 8 oz)     Flowsheet Rows      First Filed Value   Admission Height  182.9 cm (72\") Documented at 05/29/2018 0100   Admission Weight  129 kg (284 lb) Documented at 05/29/2018 0100          Physical Exam:    Wound:Satisfactory    Pulses:     Mediastinal and Chest Tube Drainage:       Results Review:     Results from last 7 days  Lab Units 06/10/18  0612  06/08/18  0518   WBC 10*3/mm3  --   --  8.49   HEMOGLOBIN g/dL 7.6*  < > 7.4*   HEMATOCRIT % 24.6*  < > 23.8*   PLATELETS 10*3/mm3  --   --  183   < > = values in this interval not displayed.    Results from last 7 days  Lab Units 06/10/18  0612   SODIUM mmol/L 136   POTASSIUM mmol/L 5.3   CHLORIDE mmol/L 105   CO2 mmol/L 23.0   BUN mg/dL 29*   CREATININE mg/dL 1.46*   GLUCOSE mg/dL 92   CALCIUM mg/dL 8.6*             Assessment    Principal Problem:    CAD with previous history of stents. Most recent stent 3/16/18  Active Problems:    Hypertension    Dyslipidemia    History of leukemia    Neuropathy due to chemotherapeutic drug. Wheelchair bound with chronic pain    Tobacco abuse    Obesity    NSTEMI 5/29/18 s/p thrombectomy and PTCA    KENNETH (acute kidney injury)    Wheelchair bound    S/P CABG x 2 on 6/4/18    Chronic narcotic use      Awaiting placement        Ramesh Leggett MD  06/12/18  7:02 AM      Please note that portions of " this note were completed with a voice recognition program. Efforts were made to edit the dictations, but words may be mistranscribed

## 2018-06-12 NOTE — PROGRESS NOTES
[facility] Medicine Services  PROGRESS NOTE    Patient Name: Paulie Jordan  : 1980  MRN: [id number]    Date of Admission: 2018  Length of Stay: 14  Primary Care Physician: Cesar Neri MD    Subjective   Subjective     CC:  [facility] Med management    HPI:  Mother at bedside. Patient reports being hungry. Refuses to talk to me extensively. Reports chronic pain. Denies cough, dyspnea.     Review of Systems  Gen- No fevers, chills  CV- No chest pain, palpitations  Resp- No cough, dyspnea  GI- No N/V/D, abd pain    Otherwise ROS is negative except as mentioned in the HPI.    Objective   Objective     Vital Signs:   Temp:  [98.1 °F (36.7 °C)-98.5 °F (36.9 °C)] 98.1 °F (36.7 °C)  Heart Rate:  [63-78] 72  Resp:  [16-18] 18  BP: ()/(60-71) 112/68        Physical Exam:  Constitutional: No acute distress, awake, alert on RA  Eyes: PERRLA, sclerae anicteric, no conjunctival injection  HENT: NCAT, mucous membranes moist  Neck: Supple, no thyromegaly, no lymphadenopathy, trachea midline  Respiratory: Clear to auscultation bilaterally, nonlabored respirations   Cardiovascular: RRR, no murmurs, rubs, or gallops, palpable pedal pulses bilaterally  Gastrointestinal: Positive bowel sounds, soft, nontender, nondistended  Musculoskeletal: No bilateral ankle edema, no clubbing or cyanosis to extremities, midline chest incision healing well  Psychiatric: Appropriate affect, cooperative  Neurologic: Oriented x 3, strength symmetric in all extremities, Cranial Nerves grossly intact to confrontation, speech clear  Skin: No rashes    Results Reviewed:  I have personally reviewed current lab, radiology, and data and agree.      Results from last 7 days  Lab Units 06/10/18  0612 18  0506 18  0518 18  0351 18  0350   WBC 10*3/mm3  --   --  8.49 11.18* 10.45   HEMOGLOBIN g/dL 7.6* 7.3* 7.4* 7.5* 8.2*   HEMATOCRIT % 24.6* 23.9* 23.8* 24.0* 26.1*   PLATELETS 10*3/mm3  --   --   183 154 138*       Results from last 7 days  Lab Units 06/10/18  0612 06/09/18  0506 06/08/18  0518   SODIUM mmol/L 136 134 137   POTASSIUM mmol/L 5.3 4.7 4.3   CHLORIDE mmol/L 105 103 105   CO2 mmol/L 23.0 25.0 26.0   BUN mg/dL 29* 31* 28*   CREATININE mg/dL 1.46* 1.58* 1.48*   GLUCOSE mg/dL 92 84 108*   CALCIUM mg/dL 8.6* 8.3* 8.2*     No results found for: BNP  No results found for: PHART    Microbiology Results Abnormal     None          Imaging Results (last 24 hours)     ** No results found for the last 24 hours. **        Results for orders placed during the hospital encounter of 05/29/18   Adult Transthoracic Echo Complete W/ Cont if Necessary Per Protocol    Narrative · The study is technically difficult for diagnosis.  · Left ventricular systolic function is mildly decreased. Estimated EF =   40%.  · The following left ventricular wall segments are hypokinetic: mid   anterior, apical anterior, apical inferior, apical septal, apex   hypokinetic and mid anteroseptal.          I have reviewed the medications.    Assessment/Plan   Assessment / Plan     Hospital Problem List     * (Principal)CAD with previous history of stents. Most recent stent 3/16/18    Overview Addendum 6/5/2018  7:47 AM by ROBBIN Kunz     1.   NSTEMI 8/22/2016   2. Cardiac catheterization 8/22/2016: Single vessel- CAD with total ostial occlusion of LAD. PTCA/stent of LAD with 3.5 x 33 mm ALISHA reducing 100% stenosis to 0%. EF 40-45%   3. Echocardiogram 8/23/2016: EF 55%. Trace MR. Trace TR.  4. NSTEMI 09/2017: LHC demonstrating normal EF, widely patent LAD stent, no obstructive disease   5. Recurrent NSTEMI 02/2018: treated medically due to recent LHC demonstrating no obstructive disease, felt to be related to small vessel disease vs coronary spasm   6. Echo 2/3/2018: EF 60%  7. NSTEMI 03/2018 (with transient 1mm FRITZ inferior leads prior to [facility] arrival)   · Cardiac catheterrization  3/16/18 Dr Munguia:  Anteroapical hypokinesia and  "left ventricular ejection fraction of 54%,  three-vessel nonobstructive coronary disease and a widely patent proximal LAD stent. Despite a \"borderline normal\" iFR, a \"culprit\" lesion in the ostium of his Ramus Intermedians artery as defined by an in lesion MLD of 1.3 mm and a large burden not only of plaque but of \"red\" thrombus status post PTCA and stenting with a 3 x 18 mm Xience Alpine stent.  40% ostial narrowing of a distal branch of the EDYTA with iFR of 0.90   8. NSTEMI, 5-29-18:  · LHC   severe 1 vessel coronary artery disease involving a 100% thrombotic ostial LAD stenosis.  The lesion is now status post aspiration thrombectomy and angioplasty.  There is at least a residual 30-40% ostial segment stenosis.  · There is a patent previously placed stent in the ostial ramus intermedius artery with at most a 20% ostial segment narrowing.    9. Coronary artery bypass graft ×2 with EVH.   saphenous vein graft to circumflex and left internal mammary artery to LAD, 6-4-18         Hypertension    Dyslipidemia    History of leukemia    Neuropathy due to chemotherapeutic drug. 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 male debilitated wheelchair-bound male with a history of CAD, HTN, hyperlipidemia, IBS, hypothyroidism and leukemia (initially diagnosed in 1998 with recurrence in 2000). He is s/p chemotherapy, radiation and bone marrow transplant (2000). This left him with significant neuropathy and inability to ambulate. History of multiple NSTEMIs, most recently 3/2018 for which he underwent PCTA and stent and 5/25/2018. He was transferred to [facility], underwent LHC revealing occluded distal LAD and underwent thrombectomy and angioplasty. Because of residual disease, he underwent CABG x 2 on " 6/4/2018.      Home MS Contin has been increased to 45mg PO BID. Despite this, he continues to ask for pain medications (PO or IV) frequently. He intermittently refuses to work with physical therapy. Transferred to the floor 6/8 and hospital medicine is following for medical management.     Assessment & Plan:    - KENNETH: Baseline Cr 1.0. Stable  - Coronary artery disease s/p CABG x 2 on 6/4/18 by Dr. Leggett: asa, statin, BB  - Chronic systolic CHF: ECHO EF 40%. On BB. BP too low for Entresto or ACEi/ARB  - Hypothyr: Levothyr  - Chronic pain with chronic narcotic use: he would benefit from a suboxone clinic. No further increases in narcotic pain medications. Unfortunately, cannot use NSAIDS due to his renal insufficiency. Explained to him that we will not be able to get him completely pain free and that our goal is to keep his pain manageable (Pain medication dispensed by father at home with h/o opiate abuse)     Okay to DC per Hospitalist Medicine     Sharla Rene MD  06/12/18  3:02 PM

## 2018-06-12 NOTE — PROGRESS NOTES
Continued Stay Note  [facility]     Patient Name: Paulie Jordan  MRN: [id number]  Today's Date: 6/12/2018    Admit Date: 5/29/2018          Discharge Plan     Row Name 06/12/18 1327       Plan    Plan [facility]    Patient/Family in Agreement with Plan yes    Plan Comments Per Rebecca with [facility] physician has signed off on Mr. Jordan and can accept patient for Acute Rehab, no beds available today, anticipate transferring on 6/13 if medically ready. EMS has been arranged to transport on 6/13 @ noon. PCS form completed and copy placed on chart. Updated primary nurse, Evelyn Rankin CTS. Per cardiology's note this morning they have signed off.     Row Name 06/12/18 1050       Plan    Plan Awaiting bed offer    Plan Comments Spoke with Laura at [facility] and they cannot offer a bed to patient. Notified patient and mother at bedside. Per Rebceca with [facility], she will let me know by 12:00 noon today if patient can be transferred to [facility] this afternoon.               Discharge Codes    No documentation.       Expected Discharge Date and Time     Expected Discharge Date Expected Discharge Time    Jun 13, 2018             Naya Rodriguez RN

## 2018-06-12 NOTE — NURSING NOTE
Walked in on pt trying to use chewing tobacco. Had two cans at the bedside that were not previously there. Pt questioned about his knowledge on what this did to his body specifically to the workload of his heart. He was aware and I further educated him as well. Pt requesting nicotine patch.

## 2018-06-12 NOTE — PLAN OF CARE
Problem: Patient Care Overview  Goal: Plan of Care Review   06/12/18 1546   Coping/Psychosocial   Plan of Care Reviewed With patient   OTHER   Outcome Summary Pt. agreeable to bed ex's, declined txfrs and ADL's despite education on benefits. Pt scheduled for [facility] tomorrow.

## 2018-06-12 NOTE — PROGRESS NOTES
"[facility]  IP Progress Note      Chief Complaint: Follow-up of CAD/non-STEMI/CM    Subjective:  States that he gets short of breath with activity and heart rate increases, continues to complain of sternal pain.  No nausea or vomiting.  Did work with physical therapy yesterday.    Objective:  Blood pressure 112/68, pulse 72, temperature 98.1 °F (36.7 °C), resp. rate 18, height 182.9 cm (72.01\"), weight (!) 139 kg (307 lb 8 oz), SpO2 96 %.     Intake/Output Summary (Last 24 hours) at 06/12/18 0932  Last data filed at 06/12/18 0822   Gross per 24 hour   Intake             3400 ml   Output              650 ml   Net             2750 ml     Physical Exam:  General: No apparent distress  Neck: no JVD.  Chest:No respiratory distress, breath sounds are normal. No wheezes,  rhonchi or rales.  Cardiovascular: Normal S1 and S2, no murmer, gallop or rub.    Extremities: No edema.    Results Review:     I reviewed the patient's new clinical results.      Results from last 7 days  Lab Units 06/10/18  0612  06/08/18  0518   WBC 10*3/mm3  --   --  8.49   HEMOGLOBIN g/dL 7.6*  < > 7.4*   HEMATOCRIT % 24.6*  < > 23.8*   PLATELETS 10*3/mm3  --   --  183   < > = values in this interval not displayed.    Results from last 7 days  Lab Units 06/10/18  0612   SODIUM mmol/L 136   POTASSIUM mmol/L 5.3   CHLORIDE mmol/L 105   CO2 mmol/L 23.0   BUN mg/dL 29*   CREATININE mg/dL 1.46*   CALCIUM mg/dL 8.6*   GLUCOSE mg/dL 92       Results from last 7 days  Lab Units 06/10/18  0612   SODIUM mmol/L 136   POTASSIUM mmol/L 5.3   CHLORIDE mmol/L 105   CO2 mmol/L 23.0   BUN mg/dL 29*   CREATININE mg/dL 1.46*   GLUCOSE mg/dL 92   CALCIUM mg/dL 8.6*         Lab Results   Component Value Date    CKTOTAL 132 03/16/2018    CKMB 11.03 (C) 03/16/2018    CKMBINDEX 8.4 (H) 03/16/2018    TROPONINI 18.261 (C) 05/29/2018                   Tele: Sinus Rythym          Assessment and Plan:   1. CAD with recurrent NSTEMI  - Echo " EF 40%  - s/p 2 vessel CABG today 6/4/18  per Dr. Leggett   - hemodynamically stable, currently NSR, off all vasopressors.  - Continue current management.  2. HLD  3. Tobacco abuse, Cessation discussed.  4. KENNETH, stable, continue to monitor.  5. Chronic pain, acute exacerbation post CABG.  Further pain management per ICU team/CT surgery  6.  Post CABG anemia.     Blood pressure too low for ACE ihibitor/ARB/Entresto.  Started on low-dose metoprolol, tolerating well, will increase dose to 25 mg every 12 hours.  Needs comprehensive physical therapy, probable discharge to a rehabilitation facility soon, okay any time from cardiology standpoint.   Nothing more to add, I will sign off, please call us if there are any questions or concerns.  We will schedule him for an outpatient cardiology follow-up visit at [facility] in 4-6 weeks.    Devorah Hightower MD, FACC, [facility]

## 2018-06-13 NOTE — DISCHARGE SUMMARY
CTS Discharge Summary    Patient Care Team:  Cesar Neri MD as PCP - General  Cesar Neri MD as PCP - Family Medicine  Cesar Neri MD as PCP - Claims Attributed  Ernestine Barajas, RN as Care Coordinator (Population Health)  Consults:   Consults     Date and Time Order Name Status Description    6/8/2018 1027 Inpatient Consult to Hospitalist APRN for Medical Management      6/4/2018 1150 Inpatient Consult to Cardiology      6/3/2018 1319 Inpatient Anesthesiology Consult      5/29/2018 0207 Inpatient Cardiology Consult            Date of Admission: 5/29/2018  1:04 AM  Date of Discharge:  6/13/2018    Discharge Diagnosis  Past Medical History:   Diagnosis Date   • Chronic pain    • Gout    • Hypercholesterolemia    • Hypertension    • Leukemia     dx age 18 and relapsed age 19   • Leukemia    • MI, old     2016   • 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     Unstable angina [I20.0]  NSTEMI (non-ST elevated myocardial infarction) [I21.4]  Coronary artery disease involving native coronary artery of native heart with angina pectoris [I25.119]     Procedures Performed  Procedure(s):  MEDIAN STERNOTOMY, CORONARY ARTERY BYPASS X  2 WITH INTERNAL MAMMARY ARTERY GRAFT, EVH OF THE RIGHT GREATER SAPHENOUS VEIN AND EXPLORATION OF THE LEFT LEG       History of Present Illness  Patient is a 38 y.o. 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  He presented to the [place] emergency room.  He had an elevated troponin consistent with myocardial infarction.  He essentially has been transferred to [facility] for further evaluation.  He has been seen here before and has had placement of ramus circumflex as well as LAD stents.  He underwent cardiac catheterization today by Dr. Triana and was found to have complete occlusion of his LAD.   I been asked see the patient guards possible  coronary bypass surgery.  The patient has been on Brilinta and Plavix.  Patient currently is pain free.      Hospital Course  Patient was taken to the operating room on 6/4/2018 for CABG x2 with endoscopic vein harvest. Patient was transported to cardiac ICU intubated and in stable condition.   Patient was extubated per ICU protocol.  POD 1:  Howe Madisyn catheter, arterial line and mohr discontinued.  Pain management.  POD 2:  Levophed weaned off. Chest tubes removed.  Pain management an issue.  On 4-6L NC. Atelectasis.  POD 3:  Pain management.  Low dose Levophed.  POD 4:  Transferred to telemetry.  Not mobilizing.  POD 6:  Watching Creatinine and H&H.  Weak.  Not mobilizing.  POD 7:  Awaiting rehab placement.  POD 8:  Awaiting rehab placement.  Worked with PT.  POD 9:  Patient transferred to [facility]        Discharge Medications     Discharge Medications      New Medications      Instructions Start Date   aspirin 325 MG EC tablet  Replaces:  aspirin 81 MG tablet   Take 1 tablet by mouth Daily.      metoprolol tartrate 25 MG tablet  Commonly known as:  LOPRESSOR   Take 1 tablet by mouth Every 12 (Twelve) Hours.         Changes to Medications      Instructions Start Date   atorvastatin 80 MG tablet  Commonly known as:  LIPITOR  What changed:  Another medication with the same name was removed. Continue taking this medication, and follow the directions you see here.   Take 1 tablet by mouth Every Night.         Continue These Medications      Instructions Start Date   buPROPion  MG 24 hr tablet  Commonly known as:  WELLBUTRIN XL   Take 300 mg by mouth Daily.      citalopram 40 MG tablet  Commonly known as:  CeleXA   Take 40 mg by mouth daily.      diazePAM 10 MG tablet  Commonly known as:  VALIUM   Take 10 mg by mouth At Night As Needed for Sedation.      febuxostat 40 MG tablet  Commonly known as:  ULORIC   Take 40 mg by mouth Daily.      levothyroxine 50 MCG tablet  Commonly known as:  SYNTHROID,  LEVOTHROID   Take 50 mcg by mouth daily.      Morphine 30 MG 12 hr tablet  Commonly known as:  MS CONTIN   Take 30 mg by mouth 2 (Two) Times a Day.      nitroglycerin 0.4 MG SL tablet  Commonly known as:  NITROSTAT   Place 0.4 mg under the tongue Every 5 (Five) Minutes As Needed for Chest Pain. Take no more than 3 doses in 15 minutes.      olopatadine 0.1 % ophthalmic solution  Commonly known as:  PATANOL   Administer 1 drop to both eyes Daily As Needed for Allergies.      omeprazole 20 MG capsule  Commonly known as:  priLOSEC   Take 20 mg by mouth daily.      VASCEPA 1 g capsule capsule  Generic drug:  icosapent ethyl   Take 2 g by mouth 2 (Two) Times a Day With Meals.         Stop These Medications    aspirin 81 MG tablet  Replaced by:  aspirin 325 MG EC tablet     carvedilol 6.25 MG tablet  Commonly known as:  COREG     ticagrelor 90 MG tablet tablet  Commonly known as:  BRILINTA            Discharge Diet:   Diet Instructions     Diet: Cardiac       Discharge Diet:  Cardiac          Activity at Discharge:   Activity Instructions     Discharge Activity Restrictions       1) No driving for 4 weeks and no longer taking narcotics.   2) Do not lift / push / pull more than 10 lbs.          Follow-up Appointments  Future Appointments  Date Time Provider Department Center   6/20/2018 10:15 AM KRISTOPHER Resendez [facility]           ROBBIN Pfeiffer  06/13/18  12:21 PM

## 2018-06-13 NOTE — PROGRESS NOTES
Case Management Discharge Note    Final Note: Per Rebecca with [facility], patient can transfer to Spinal Cord Unit there today to an acute rehab bed. Ambulance has been scheduled through [facility] for 12:00 noon today.     Nurse to call report to P: [phone]. Transfer summary does not need to be faxed as facility liaison has access to [employer]. Thank you. Allie Betts, RN [phone]    Destination - Selection Complete     Service Request Status Selected Specialties Address Phone Number Fax Number    [facility] Selected Rehabilitation [address] , [facility] [id number] [phone]    [facility] Pending - Request Sent N/A [address] , [place] [facility] [address] [phone]    [facility] - Request Sent N/A [address], [place] [facility] [address] [phone]    [facility]ined [facility] [address] [facility] [place] [address] [phone]      Durable Medical Equipment     No service coordination in this encounter.      Dialysis/Infusion     No service coordination in this encounter.      Home Medical Care     No service coordination in this encounter.      Social Care     No service coordination in this encounter.        Ambulance: [facility]    Final Discharge Disposition Code: 62 - inpatient rehab facility

## 2018-06-13 NOTE — PROGRESS NOTES
"Paulie Jordan  [id number]  1980     LOS: 15 days   Patient Care Team:  Cesar Neri MD as PCP - General  Cesar Neri MD as PCP - Family Medicine  Cesar Neri MD as PCP - Claims Attributed  Ernestine Barajas, RN as Care Coordinator (Population Health)    Chief Complaint: Coronary artery disease      Subjective: No complaints    Objective:     Vital Sign Min/Max for last 24 hours  Temp  Min: 97.8 °F (36.6 °C)  Max: 98.3 °F (36.8 °C)   BP  Min: 100/63  Max: 112/68   Pulse  Min: 59  Max: 72   Resp  Min: 18  Max: 18   SpO2  Min: 92 %  Max: 98 %   No Data Recorded   No Data Recorded     Flowsheet Rows      First Filed Value   Admission Height  182.9 cm (72\") Documented at 05/29/2018 0100   Admission Weight  129 kg (284 lb) Documented at 05/29/2018 0100          Physical Exam:    Wound:Satisfactory    Pulses:     Mediastinal and Chest Tube Drainage:       Results Review:     Results from last 7 days  Lab Units 06/10/18  0612  06/08/18  0518   WBC 10*3/mm3  --   --  8.49   HEMOGLOBIN g/dL 7.6*  < > 7.4*   HEMATOCRIT % 24.6*  < > 23.8*   PLATELETS 10*3/mm3  --   --  183   < > = values in this interval not displayed.    Results from last 7 days  Lab Units 06/13/18  0358   SODIUM mmol/L 132   POTASSIUM mmol/L 4.1   CHLORIDE mmol/L 101   CO2 mmol/L 22.0   BUN mg/dL 30*   CREATININE mg/dL 1.63*   GLUCOSE mg/dL 81   CALCIUM mg/dL 8.5*             Assessment    Principal Problem:    CAD with previous history of stents. Most recent stent 3/16/18  Active Problems:    Hypertension    Dyslipidemia    History of leukemia    Neuropathy due to chemotherapeutic drug. Wheelchair bound with chronic pain    Tobacco abuse    Obesity    NSTEMI 5/29/18 s/p thrombectomy and PTCA    KENNETH (acute kidney injury)    Wheelchair bound    S/P CABG x 2 on 6/4/18    Chronic narcotic use       satisfactory, discharge to rehabilitation today        Ramesh Leggett MD  06/13/18  7:55 AM      Please note that portions of this note were " completed with a voice recognition program. Efforts were made to edit the dictations, but words may be mistranscribed

## 2018-06-13 NOTE — NURSING NOTE
Called report to Gifty at [facility], Spinal Cord Unit at [phone].

## 2018-06-20 PROBLEM — I50.22 CHRONIC SYSTOLIC HEART FAILURE (HCC): Status: ACTIVE | Noted: 2018-01-01

## 2018-06-20 NOTE — PROGRESS NOTES
Subjective:     Encounter Date:06/20/2018      Patient ID: Paulie Jordan is a 38 y.o. male.    Chief Complaint: short interval follow up after CABG    History of Present Illness:  [facility]. [facility] for follow up on low LVEF 40% associated w/ CABG on 6/4/2018.  His medications were adjusted appropriately prior to discharge on 6/13/2018.  Patient also has had prolonged QT interval per EKG throughout his hospitalization.    Patient is currently still in Rehab @ [facility].  He states he is making progress and hopes to return home to [place] soon.  No specific complaints today.    Past Medical History:   Diagnosis Date   • Chronic pain    • Gout    • Hypercholesterolemia    • Hypertension    • Leukemia     dx age 18 and relapsed age 19   • Leukemia    • MI, old     2016   • 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       Past Surgical History:   Procedure Laterality Date   • BONE MARROW TRANSPLANT     • CARDIAC CATHETERIZATION N/A 8/22/2016    Procedure: Left Heart Cath;  Surgeon: Devorah Hightower MD;  Location:  [facility];  Service:    • CARDIAC CATHETERIZATION N/A 9/23/2017    Procedure: Left Heart Cath;  Surgeon: Devorah Hightower MD;  Location:  [facility];  Service:    • CARDIAC CATHETERIZATION N/A 3/16/2018    Procedure: Left Heart Cath;  Surgeon: Bryon Munguia MD;  Location:  [facility];  Service: Cardiology   • CARDIAC CATHETERIZATION N/A 5/29/2018    Procedure: Left Heart Cath;  Surgeon: Bryon Triana MD;  Location:  [facility];  Service: Cardiovascular   • CORONARY ARTERY BYPASS GRAFT N/A 6/4/2018    Procedure: MEDIAN STERNOTOMY, CORONARY ARTERY BYPASS X  2 WITH INTERNAL MAMMARY ARTERY GRAFT, EVH OF THE RIGHT GREATER SAPHENOUS VEIN AND EXPLORATION OF THE LEFT LEG;  Surgeon: Ramesh Leggett MD;  Location:  [facility] OR;  Service: Cardiothoracic   • CORONARY STENT PLACEMENT         Social History      Social History   • Marital status:      Spouse name: N/A   • Number of children: N/A   • Years of education: N/A     Occupational History   • DISABLED      Social History Main Topics   • Smoking status: Never Smoker   • Smokeless tobacco: Current User     Types: Chew   • Alcohol use No   • Drug use: No   • Sexual activity: Defer     Other Topics Concern   • Not on file     Social History Narrative    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   HENT: Negative for congestion, hearing loss, hoarse voice and nosebleeds.    Eyes: Negative for blurred vision, visual disturbance and visual halos.   Cardiovascular: Positive for chest pain, claudication, dyspnea on exertion, irregular heartbeat, leg swelling and orthopnea. Negative for cyanosis, near-syncope, palpitations, paroxysmal nocturnal dyspnea and syncope.   Respiratory: Positive for shortness of breath and sleep disturbances due to breathing. Negative for cough, hemoptysis, snoring, sputum production and wheezing.    Hematologic/Lymphatic: Negative for bleeding problem. Does not bruise/bleed easily.   Skin: Negative for dry skin, itching and rash.   Musculoskeletal: Positive for gout. Negative for arthritis, joint pain, joint swelling and myalgias.   Gastrointestinal: Positive for constipation and nausea. Negative for bloating, abdominal pain, diarrhea, flatus, heartburn, hematemesis, hematochezia, melena and vomiting.   Genitourinary: Positive for decreased libido. Negative for dysuria, frequency, hematuria,  nocturia and urgency.   Neurological: Negative for excessive daytime sleepiness, dizziness, headaches, light-headedness and loss of balance.   Psychiatric/Behavioral: Positive for altered mental status (CONFUSION) and memory loss. Negative for depression. The patient has insomnia. The patient is not nervous/anxious.          Objective:     Physical Exam   Constitutional: He is oriented to person, place, and time. He appears well-developed and well-nourished. No distress.   Morbidly obese young adult gentleman in NAD   HENT:   Head: Normocephalic and atraumatic.   Mouth/Throat: No oropharyngeal exudate.   Eyes: Conjunctivae are normal. Pupils are equal, round, and reactive to light. No scleral icterus.   Neck: Neck supple. No JVD present.   Cardiovascular: Normal rate, regular rhythm, normal heart sounds and intact distal pulses.  Exam reveals no gallop and no friction rub.    No murmur heard.  Pulmonary/Chest: Effort normal and breath sounds normal. No respiratory distress. He has no wheezes. He has no rales. He exhibits no tenderness.   Musculoskeletal: Normal range of motion. He exhibits no edema.   Neurological: He is alert and oriented to person, place, and time.   Skin: Skin is warm and dry.   Midsternal incision and MT sites covered with clean occlusive dressing.  Underneath, staples observed.  Incision is well approximated without induration or erythema.  EVH sites on both lower legs also have staples which are intact/ no induration/ no tenderness or exudate.   Psychiatric: He has a normal mood and affect. His behavior is normal.       Lab Review: DC summary, inpatient echo, labs, op note     Assessment/ Plan:          Diagnosis Plan   1. Prolonged Q-T interval on ECG  EKG in clinic has NSR @ 76 bpm.  QT/QTc 444/499 ms.  While presently his QT interval is <500 ms, patient is on max dose Wellbutrin and Celexa which are known to prolong QT.  Advised patient to follow closely with Cardiology after DC from Rehab  and remember to get EKG checked if he gets Rx for antibiotics.       2. Coronary artery disease s/p CABG ASA, stat, BB.  Changed BB to heart failure indicated BB, coreg (he was on coreg prior to most recent admission).      Checked with CT Surgery, ROBBIN Trimble, regarding presence of stables.  OK to leave in place until follow up 7/2/18.  He will follow up with Cardiology in [facility] in July.     3. Class 3 obesity due to excess calories with serious comorbidity and body mass index (BMI) of 40.0 to 44.9 in adult  Continue physical rehabilitation and cardiac diet.   4. Neuropathy due to chemotherapeutic drug. Wheelchair bound with chronic pain  Chronic narcotics

## 2018-09-24 NOTE — TELEPHONE ENCOUNTER
THIS PT WILL NO LONGER NEED TO BE SEEN BY OUR OFFICE, PT HAS N/S ON 7/2, AND CX THREE TIMES ( 7/23, 8/6, 9/17). I WILL START THE LETTER PROCESS AND SEE IF PT CONTACTS OFFICE IF DOES NOT, THEN PT WILL NOT NEED TO BE SEEN FOR THERE FUTURE CARE. PT WAS SEEN IN A POST OP STAND POINT BY YENI TAY ON 6/20/18

## 2018-12-24 NOTE — ED NOTES
Contacted [facility], requested transport to [facility].  Flight accepted, communications advises 20 min ETA.  Dr Cooper made aware, [facility] made aware.      Shantelle Kuhn RN  12/24/18 3743

## 2018-12-24 NOTE — ED NOTES
Dr Cooper to bedside, discusses plan of care with patient and patient's family.  Pt agrees to stay at [facility] for treatment.      Shantelle Kuhn RN  12/24/18 4544

## 2018-12-24 NOTE — ED NOTES
One-Push activated per Dr. Cooper @ 7244  EKG performed @3035  Called Dr. Nolan @3115  Called soumya Jay supervisor @3677       Isabel Mcgowan  12/24/18 2005

## 2018-12-24 NOTE — ED NOTES
Dr Nolan to bedside. [facility] TIFFANIE Ramos, HONEY advises that cath lab staff currently has patient in lab at this time.     Shantelle uKhn, HONEY  12/24/18 2094

## 2018-12-24 NOTE — ED NOTES
Cath lab calls, ready for patient to be transported to lab.     Shantelle Kuhn, HONEY  12/24/18 3778

## 2018-12-24 NOTE — CONSULTS
Consults    Patient Identification:    Name:  Paulie Jordan  Age:  38 y.o.  Sex:  male  :  1980  MRN:  [id number]  Visit Number:  [id number]  Primary care provider:  Cesar Neri MD    Chief complaint:   Chest pain    History of presenting illness:   Pt is a 38 y male with hx of complex CAD , multiple PCI in past, had cardiac cath recently which had ostial LAD thrombus s/p PCI and ended up having CABG LIMA - LAD and SVG- OM, presented with chest pain in ER, according to family he has been having chest pain on and off for past 2 weeks. His cardiologist is Dr Hightower in [facility], they have not seen since CABG. He also has Leuckemia s/p bone marrrow transplant and wheel chair bound due to severe neuropathy.     On presentation her in ER his BP was 70/30 , he was cold and clammy and was in severe chest pain. EKG showed ST elevation in lateral leads.     CODE AMI was activated and he was taken to the cath lab emergently.   ---------------------------------------------------------------------------------------------------------------------  Review of Systems   Not obtained.   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     Past Surgical History:   Procedure Laterality Date   • BONE MARROW TRANSPLANT     • CARDIAC CATHETERIZATION N/A 2016    Procedure: Left Heart Cath;  Surgeon: Devorah Hightower MD;  Location: [facility]  INVASIVE LOCATION;  Service:    • CARDIAC CATHETERIZATION N/A 9/23/2017    Procedure: Left Heart Cath;  Surgeon: Devoarh Hightower MD;  Location:  [facility];  Service:    • CARDIAC CATHETERIZATION N/A 3/16/2018    Procedure: Left Heart Cath;  Surgeon: Bryon Munguia MD;  Location:  [facility];  Service: Cardiology   • CARDIAC CATHETERIZATION N/A 5/29/2018    Procedure: Left Heart Cath;  Surgeon: Bryon Triana MD;  Location:  [facility];  Service: Cardiovascular   • CORONARY ARTERY BYPASS GRAFT N/A 6/4/2018    Procedure: MEDIAN STERNOTOMY, CORONARY ARTERY BYPASS X  2 WITH INTERNAL MAMMARY ARTERY GRAFT, EVH OF THE RIGHT GREATER SAPHENOUS VEIN AND EXPLORATION OF THE LEFT LEG;  Surgeon: Ramesh Leggett MD;  Location:  [facility] OR;  Service: Cardiothoracic   • CORONARY STENT PLACEMENT       Social History     Socioeconomic History   • Marital status:      Spouse name: Not on file   • Number of children: Not on file   • Years of education: Not on file   • Highest education level: Not on file   Occupational History   • Occupation: DISABLED   Tobacco Use   • Smoking status: Never Smoker   • Smokeless tobacco: Current User     Types: Chew   Substance and Sexual Activity   • Alcohol use: No   • Drug use: No   • Sexual activity: Defer   Social History Narrative    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 Taking?    aspirin  MG EC tablet   No No    Take 1 tablet by mouth Daily.    atorvastatin (LIPITOR) 80 MG tablet   No No    Take 1 tablet by mouth Every  Night.    buPROPion XL (WELLBUTRIN XL) 300 MG 24 hr tablet  Self Yes No    Take 300 mg by mouth Daily.    carvedilol (COREG) 6.25 MG tablet   No No    Take 1 tablet by mouth 2 (Two) Times a Day With Meals.    citalopram (CeleXA) 40 MG tablet  Self Yes No    Take 40 mg by mouth daily.    diazePAM (VALIUM) 10 MG tablet  Self Yes No    Take 10 mg by mouth At Night As Needed for Sedation.    febuxostat (ULORIC) 40 MG tablet   Yes No    Take 40 mg by mouth Daily.    icosapent ethyl (VASCEPA) 1 g capsule capsule   Yes No    Take 2 g by mouth 2 (Two) Times a Day With Meals.    levothyroxine (SYNTHROID, LEVOTHROID) 50 MCG tablet  Self Yes No    Take 50 mcg by mouth daily.    morphine (MS CONTIN) 30 MG 12 hr tablet  Self Yes No    Take 30 mg by mouth 2 (Two) Times a Day.    nitroglycerin (NITROSTAT) 0.4 MG SL tablet   Yes No    Place 0.4 mg under the tongue Every 5 (Five) Minutes As Needed for Chest Pain. Take no more than 3 doses in 15 minutes.    olopatadine (PATANOL) 0.1 % ophthalmic solution  Self Yes No    Administer 1 drop to both eyes Daily As Needed for Allergies.    omeprazole (PriLOSEC) 20 MG capsule  Self Yes No    Take 20 mg by mouth daily.        Hospital Meds:    [MAR Hold] heparin (porcine) 4,000 Units Subcutaneous Q8H   [MAR Hold] sodium chloride 1,000 mL Intravenous Once       norepinephrine 0.02-0.3 mcg/kg/min     ---------------------------------------------------------------------------------------------------------------------   Vital Signs:  Temp:  [94 °F (34.4 °C)] 94 °F (34.4 °C)  Heart Rate:  [65-96] 66  Resp:  [20-22] 22  BP: (64-73)/(38-56) 68/52      12/24/18  1346   Weight: 125 kg (275 lb)     Body mass index is 35.31 kg/m².  ---------------------------------------------------------------------------------------------------------------------   Physical exam:   Constitutional:  In distress due to pain  HENT:  Head: Normocephalic and atraumatic.   Eyes:  Conjunctivae and EOM are normal.  Pupils are  equal, round, and reactive to light.  No scleral icterus.    Cardiovascular:  Normal rate, regular rhythm and normal heart sounds with no murmur.  Pulmonary/Chest:  Mild respiratory distress, no wheezes, no crackles, with normal breath sounds and good air movement.    Abdominal:  Soft.  Bowel sounds are normal.  No distension and no tenderness.   Neurological:  Alert and oriented to person, place, and time.  Skin:  Skin is warm and dry.  No rash noted.  No pallor.   Psychiatric:  Normal mood and affect.  Behavior is normal.  Judgment and thought content normal.   Peripheral vascular:  Cold to palpation, poor distal pulses.     ---------------------------------------------------------------------------------------------------------------------   EKG: Sinus, IVCD, Lateral STEMI    I have personally looked at both the EKG and the telemetry strips.  Echo:  Results for orders placed during the hospital encounter of 05/29/18   Adult Transthoracic Echo Complete W/ Cont if Necessary Per Protocol    Narrative · The study is technically difficult for diagnosis.  · Left ventricular systolic function is mildly decreased. Estimated EF =   40%.  · The following left ventricular wall segments are hypokinetic: mid   anterior, apical anterior, apical inferior, apical septal, apex   hypokinetic and mid anteroseptal.        ---------------------------------------------------------------------------------------------------------------------   Results from last 7 days   Lab Units  12/24/18   1407   LACTATE mmol/L  2.8*   WBC 10*3/mm3  14.83*   HEMOGLOBIN g/dL  18.0   HEMATOCRIT %  53.0*   MCV fL  93.8   MCHC g/dL  34.0   PLATELETS 10*3/mm3  262   INR   1.13*         Results from last 7 days   Lab Units  12/24/18   1407   SODIUM mmol/L  140   POTASSIUM mmol/L  3.2*   MAGNESIUM mg/dL  1.9   CHLORIDE mmol/L  106   CO2 mmol/L  20.6*   BUN mg/dL  9   CREATININE mg/dL  1.31*   EGFR IF NONAFRICN AM mL/min/1.73  61   CALCIUM mg/dL  9.1    GLUCOSE mg/dL  152*   ALBUMIN g/dL  3.90   BILIRUBIN mg/dL  0.9   ALK PHOS U/L  147*   AST (SGOT) U/L  19   ALT (SGPT) U/L  13   Estimated Creatinine Clearance: 105.3 mL/min (A) (by C-G formula based on SCr of 1.31 mg/dL (H)).  No results found for: AMMONIA  Results from last 7 days   Lab Units  12/24/18   1407   TROPONIN I ng/mL  1.311*         Lab Results   Component Value Date    HGBA1C 5.70 (H) 05/29/2018     Lab Results   Component Value Date    TSH 4.607 02/03/2018     No results found for: PREGTESTUR, PREGSERUM, HCG, HCGQUANT  Pain Management Panel     Pain Management Panel Latest Ref Rng & Units 3/15/2018 2/2/2018    AMPHETAMINES SCREEN, URINE Negative Negative Negative    BARBITURATES SCREEN Negative Negative Negative    BENZODIAZEPINE SCREEN, URINE Negative Positive(A) Positive(A)    BUPRENORPHINE Negative Negative Negative    COCAINE SCREEN, URINE Negative Negative Negative    METHADONE SCREEN, URINE Negative Negative Negative                    Results from last 7 days   Lab Units  12/24/18   1407   CHOLESTEROL mg/dL  191   TRIGLYCERIDES mg/dL  236*   HDL CHOL mg/dL  34*   LDL CHOL mg/dL  110*       ---------------------------------------------------------------------------------------------------------------------   Imaging Results (last 7 days)     Procedure Component Value Units Date/Time    XR Chest 1 View [[id number]] Updated:  12/24/18 1432        ----------------------------------------------------------------------------------------------------------------------  Assessment:   Cardiogenic shock  Lateral STEMI   Ischemic cardiomyopathy with known EF of 40%.   CAD s/p CABG in 6/2018 with LIMA -LAD and SVG- Lcx  Leukemia  Thrombocytosis       Plan:   Pt was given Heparin bolus, he is already and Asa and Plavix.   Was taken to cath lab emergently.   Patient is high risk due to cardiogenic shock and STEMI  I have explained the risk associated with the procedure to the patient including but not  limited to an allergic reaction to the contrast material or medications used during the procedure bleeding, infection, and bruising at the catheter insertion site blood clots, which may trigger heart attack, stroke,   damage to the artery where the catheter was inserted, or damage to the arteries as the catheter travels through your body, irregular heart rhythm arrhythmias, kidney damage caused by the contrast material.      Thank you for the consult.      Wilfredo Carbajal MD, [facility]  Interventional Cardiology      12/24/18  5:00 PM

## 2018-12-24 NOTE — ED NOTES
Pt and family request pt to be transferred to [facility] for cardiac catheterization     Vicki Hernandez RN  12/24/18 2394

## 2018-12-24 NOTE — ED NOTES
Called [facility] for transfer, spoke with Malick. She will have the interventional cardiologist call.     Isabel Mcgowan  12/24/18 1413       Isabel Mcgowan  12/24/18 141

## 2018-12-24 NOTE — ED PROVIDER NOTES
Subjective   Patient presents to ER with chest pain of 1 1/2 hour duration        Chest Pain   Pain location:  L chest and epigastric  Pain quality: sharp    Pain radiates to:  Does not radiate  Pain severity:  Severe  Onset quality:  Sudden  Duration:  1 hour  Progression:  Worsening  Chronicity:  New  Context: breathing    Associated symptoms: fatigue and shortness of breath        Review of Systems   Constitutional: Positive for activity change and fatigue.   HENT: Negative.    Eyes: Negative.    Respiratory: Positive for shortness of breath.    Cardiovascular: Positive for chest pain.   Gastrointestinal: Negative.    Endocrine: Negative.    Genitourinary: Negative.    Musculoskeletal: Negative.    Allergic/Immunologic: Negative.    Neurological: Negative.    Hematological: Negative.    Psychiatric/Behavioral: Negative.        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       Allergies   Allergen Reactions   • Benadryl [Diphenhydramine] Itching     IV benadryl per patient       Past Surgical History:   Procedure Laterality Date   • BONE MARROW TRANSPLANT     • CARDIAC CATHETERIZATION N/A 8/22/2016    Procedure: Left Heart Cath;  Surgeon: Devorah Hightower MD;  Location:  [facility] IN[facility];  Service:    • CARDIAC CATHETERIZATION N/A 9/23/2017    Procedure: Left Heart Cath;  Surgeon: Devorah Hightower MD;  Location:  [facility];  Service:    • CARDIAC CATHETERIZATION N/A 3/16/2018    Procedure: Left Heart Cath;  Surgeon: Bryon Munguia MD;  Location:  [facility] IN[facility];  Service: Cardiology   • CARDIAC CATHETERIZATION N/A 5/29/2018    Procedure: Left Heart Cath;  Surgeon: Bryon Triana MD;  Location:  [facility] IN[facility];  Service: Cardiovascular   •  CORONARY ARTERY BYPASS GRAFT N/A 6/4/2018    Procedure: MEDIAN STERNOTOMY, CORONARY ARTERY BYPASS X  2 WITH INTERNAL MAMMARY ARTERY GRAFT, EVH OF THE RIGHT GREATER SAPHENOUS VEIN AND EXPLORATION OF THE LEFT LEG;  Surgeon: Ramesh Leggett MD;  Location: [facility];  Service: Cardiothoracic   • CORONARY STENT PLACEMENT         Family History   Problem Relation Age of Onset   • Hypertension Father    • Cancer Father         Colon   • Arthritis Mother    • No Known Problems Sister    • Stroke Maternal Grandmother    • Heart attack Maternal Grandmother        Social History     Socioeconomic History   • Marital status:      Spouse name: Not on file   • Number of children: Not on file   • Years of education: Not on file   • Highest education level: Not on file   Occupational History   • Occupation: DISABLED   Tobacco Use   • Smoking status: Never Smoker   • Smokeless tobacco: Current User     Types: Chew   Substance and Sexual Activity   • Alcohol use: No   • Drug use: No   • Sexual activity: Defer   Social History Narrative    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 He appears distressed.   HENT:   Head: Normocephalic and atraumatic.   Eyes: Pupils are equal, round, and reactive to light.   Neck: Normal range of motion.   Cardiovascular: Regular rhythm and normal pulses.   Pulmonary/Chest: He is in respiratory distress. He has decreased breath sounds in the right lower field and the left lower field.   Abdominal: Soft.   Musculoskeletal: Normal range of motion.        Right lower leg: Normal.        Left lower leg: Normal.   Neurological: He is alert.   Skin: Skin is warm.   Nursing note and vitals reviewed.      Procedures           ED Course                  MDM      Final diagnoses:   ST elevation myocardial infarction (STEMI), unspecified artery (CMS/HCC)             Basil Cooper MD  12/24/18 4334

## 2018-12-24 NOTE — ED NOTES
Dr. Cooper in room discussing plan of care, pt and family agree to have cardiac cath here instead of Vicki Erwin RN  12/24/18 5591

## 2018-12-26 NOTE — H&P
Patient Identification:  Name:  Paulie Jordan  Age:  38 y.o.  Sex:  male  :  1980  MRN:  [id number]   Visit Number:  [id number]  Primary Care Physician:  Cesar Neri MD    Chief complaint:   Chest pain    History of presenting illness:    Pt is a 38 y old male with hx of CAD s/p CABG in 2018 by Dr Leggett in [facility], Leukemia s/p BMT, Severe Neuropathic pain from chemotherapeutic drug and wheelchair dependent, presented to [facility] with severe chest pain, dyspnea and diaphoresis. Hx obtained mostly from family as he was in distress. He apparently has been having chest pain for 2 weeks, unclear why he did not seek medical attention, his regular cardiologist is Dr Hightower from [facility], but he has not followed up with him since his CABG in 2018 either. He had multiple appointments but either cancelled or no show.     On presentation to ER here his BP was 64/38, he was cold, pale and diaphoretic, in distress from chest pain and dyspnea in ER. EKG showed ST elevation in lateral leads, acute injury pattern. STEMI code was activated by ER. Initially family wanted to transfer the pt to [facility] as his cardiologist was there and all previous interventions were done there. I did call Dr Hightower and talked about the pt. Hernan was called.  Later pt family decided to have intervention done here. I did explain that he is on cardiogenic shock likely from ACS and has poor prognosis to begin with and will be a high risk procedure, and also told them his chance of surviving is unliekly without intervention. They verbalized understanding and wanted to proceed.     I did review his films from before CABG in 2018, he had a large thrombus in the ostial LAD with PCI but  Poor flow due to clot burden and ended up having CABG with LIMA - LAD and SVG- OM.           ROS: All systems reviewed and negative except as mentioned above.      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     Past Surgical History:   Procedure Laterality Date   • BONE MARROW TRANSPLANT     • CARDIAC CATHETERIZATION N/A 8/22/2016    Procedure: Left Heart Cath;  Surgeon: Devorah Hightower MD;  Location:  [facility];  Service:    • CARDIAC CATHETERIZATION N/A 9/23/2017    Procedure: Left Heart Cath;  Surgeon: Devorah Hightower MD;  Location:  [facility];  Service:    • CARDIAC CATHETERIZATION N/A 3/16/2018    Procedure: Left Heart Cath;  Surgeon: Bryon Munguia MD;  Location:  [facility];  Service: Cardiology   • CARDIAC CATHETERIZATION N/A 5/29/2018    Procedure: Left Heart Cath;  Surgeon: Bryon Triana MD;  Location:  [facility];  Service: Cardiovascular   • CARDIAC CATHETERIZATION N/A 12/24/2018    Procedure: Left Heart Cath;  Surgeon: Wilfredo Carbajal MD;  Location:  [facility];  Service: Cardiology   • CORONARY ARTERY BYPASS GRAFT N/A 6/4/2018    Procedure: MEDIAN STERNOTOMY, CORONARY ARTERY BYPASS X  2 WITH INTERNAL MAMMARY ARTERY GRAFT, EVH OF THE RIGHT GREATER SAPHENOUS VEIN AND EXPLORATION OF THE LEFT LEG;  Surgeon: Ramesh Leggett MD;  Location:  [facility] OR;  Service: Cardiothoracic   • CORONARY STENT PLACEMENT       Family History   Problem Relation Age of Onset   • Hypertension Father    • Cancer Father         Colon   • Arthritis Mother    • No Known Problems Sister    • Stroke Maternal Grandmother    • Heart attack Maternal Grandmother      Social History     Socioeconomic History   • Marital status:      Spouse name: Not  on file   • Number of children: Not on file   • Years of education: Not on file   • Highest education level: Not on file   Occupational History   • Occupation: DISABLED   Tobacco Use   • Smoking status: Never Smoker   • Smokeless tobacco: Current User     Types: Chew   Substance and Sexual Activity   • Alcohol use: No   • Drug use: No   • Sexual activity: Defer   Social History Narrative    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 Taking?    aspirin  MG EC tablet   No No    Take 1 tablet by mouth Daily.    atorvastatin (LIPITOR) 80 MG tablet   No No    Take 1 tablet by mouth Every Night.    buPROPion XL (WELLBUTRIN XL) 300 MG 24 hr tablet  Self Yes No    Take 300 mg by mouth Daily.    carvedilol (COREG) 6.25 MG tablet   No No    Take 1 tablet by mouth 2 (Two) Times a Day With Meals.    citalopram (CeleXA) 40 MG tablet  Self Yes No    Take 40 mg by mouth daily.    diazePAM (VALIUM) 10 MG tablet  Self Yes No    Take 10 mg by mouth At Night As Needed for Sedation.    febuxostat (ULORIC) 40 MG tablet   Yes No    Take 40 mg by mouth Daily.    icosapent ethyl (VASCEPA) 1 g capsule capsule   Yes No    Take 2 g by mouth 2 (Two) Times a Day With Meals.    levothyroxine (SYNTHROID, LEVOTHROID) 50 MCG tablet  Self Yes No    Take 50 mcg by mouth daily.    morphine (MS CONTIN) 30 MG 12 hr tablet  Self Yes No    Take 30 mg by mouth 2 (Two) Times a Day.    nitroglycerin (NITROSTAT) 0.4 MG SL tablet   Yes No    Place 0.4 mg under the tongue Every 5 (Five) Minutes As Needed for Chest Pain. Take no more than 3 doses in  15 minutes.    olopatadine (PATANOL) 0.1 % ophthalmic solution  Self Yes No    Administer 1 drop to both eyes Daily As Needed for Allergies.    omeprazole (PriLOSEC) 20 MG capsule  Self Yes No    Take 20 mg by mouth daily.        Hospital Scheduled Meds:    [MAR Hold] heparin (porcine) 4,000 Units Subcutaneous Q8H   [MAR Hold] sodium chloride 1,000 mL Intravenous Once       norepinephrine 0.02-0.3 mcg/kg/min Last Rate: 0.3 mcg/kg/min (12/24/18 1739)     ---------------------------------------------------------------------------------------------------------------------   Vital Signs:         12/24/18  1346   Weight: 125 kg (275 lb)     Body mass index is 35.31 kg/m².  ---------------------------------------------------------------------------------------------------------------------   Physical Exam:  Constitutional:  Well-developed and well-nourished.  No respiratory distress.      HENT:  Head: Normocephalic and atraumatic.  Mouth:  Moist mucous membranes.    Eyes:  Conjunctivae and EOM are normal.  Pupils are equal, round, and reactive to light.  No scleral icterus.  Neck:  Neck supple.  No JVD present.    Cardiovascular:  Normal rate, regular rhythm and normal heart sounds with no murmur.  Pulmonary/Chest:  No respiratory distress, no wheezes, no crackles, with normal breath sounds and good air movement.  Abdominal:  Soft.  Bowel sounds are normal.  No distension and no tenderness.   Musculoskeletal:  No edema, no tenderness, and no deformity.  No red or swollen joints anywhere.    Neurological:  Alert and oriented to person, place, and time.  No cranial nerve deficit.  No tongue deviation.  No facial droop.  No slurred speech.   Skin:  Skin is warm and dry.  No rash noted.  No pallor.   Psychiatric:  Normal mood and affect.  Behavior is normal.  Judgment and thought content normal.   Peripheral vascular:  No edema and strong pulses on all 4  extremities.  ---------------------------------------------------------------------------------------------------------------------  EKG:  Sinus, IVCD, Lateral acute STEMI    ---------------------------------------------------------------------------------------------------------------------   Results from last 7 days   Lab Units  12/24/18   1407   LACTATE mmol/L  2.8*   WBC 10*3/mm3  14.83*   HEMOGLOBIN g/dL  18.0   HEMATOCRIT %  53.0*   MCV fL  93.8   MCHC g/dL  34.0   PLATELETS 10*3/mm3  262   INR   1.13*         Results from last 7 days   Lab Units  12/24/18   1407   SODIUM mmol/L  140   POTASSIUM mmol/L  3.2*   MAGNESIUM mg/dL  1.9   CHLORIDE mmol/L  106   CO2 mmol/L  20.6*   BUN mg/dL  9   CREATININE mg/dL  1.31*   EGFR IF NONAFRICN AM mL/min/1.73  61   CALCIUM mg/dL  9.1   GLUCOSE mg/dL  152*   ALBUMIN g/dL  3.90   BILIRUBIN mg/dL  0.9   ALK PHOS U/L  147*   AST (SGOT) U/L  19   ALT (SGPT) U/L  13   Estimated Creatinine Clearance: 105.3 mL/min (A) (by C-G formula based on SCr of 1.31 mg/dL (H)).  No results found for: AMMONIA  Results from last 7 days   Lab Units  12/24/18   1407   TROPONIN I ng/mL  1.311*         Lab Results   Component Value Date    HGBA1C 5.70 (H) 05/29/2018     Lab Results   Component Value Date    TSH 4.607 02/03/2018     No results found for: PREGTESTUR, PREGSERUM, HCG, HCGQUANT  Pain Management Panel     Pain Management Panel Latest Ref Rng & Units 3/15/2018 2/2/2018    AMPHETAMINES SCREEN, URINE Negative Negative Negative    BARBITURATES SCREEN Negative Negative Negative    BENZODIAZEPINE SCREEN, URINE Negative Positive(A) Positive(A)    BUPRENORPHINE Negative Negative Negative    COCAINE SCREEN, URINE Negative Negative Negative    METHADONE SCREEN, URINE Negative Negative Negative                    Results from last 7 days   Lab Units  12/24/18   1407   CHOLESTEROL mg/dL  191   TRIGLYCERIDES mg/dL  236*   HDL CHOL mg/dL  34*   LDL CHOL mg/dL  110*      ---------------------------------------------------------------------------------------------------------------------  Imaging Results (last 7 days)     ** No results found for the last 168 hours. **          I have personally reviewed the radiology images and read the final radiology report.  ---------------------------------------------------------------------------------------------------------------------  Assessment and Plan:   Cardiogenic shock from ACS  Acute lateral wall STEMI   CAD s/p CABG Recently in 6/2018, multiple PCI before.   Poor medical compliance, has not followed with cardiologist since CABG.   KENNETH  HTN  Tobacco use   Leukemia s/p BMT, Severe Neuropathic pain from chemotherapeutic drug and wheelchair dependent.   Dyslipidemia  Obesity   Dementia     Did discuss with family about the poor prognosis as he was in cardiogenic shock on presentation and will be high risk procedure. They verbalized understanding and wanted to proceed.   He received ACS protocol medications in ER and also IV fluid boluses.   Was taken to cath lab emergently.   I have explained the risk associated with the procedure to the patient including but not limited to an allergic reaction to the contrast material or medications used during the procedure bleeding, infection, and bruising at the catheter insertion site blood clots, which may trigger heart attack, stroke,   damage to the artery where the catheter was inserted, or damage to the arteries as the catheter travels through your body, irregular heart rhythm arrhythmias, kidney damage caused by the contrast material.          Wilfredo Carbajal MD, [facility]  Interventional Cardiology      12/26/18  11:36 AM

## 2022-09-03 NOTE — PLAN OF CARE
[facility] Provider Note    Chief Complaint   Patient presents with   • Sore Throat     Sore throat for 1 month   • Congestion     stuffy       HPI  Eric is a 45 year old male presents today with complaint of sinus pressure sinus congestion associated with postnasal drip and sore throat for the past 1 month.  He has been taking over-the-counter medications without much relief in symptoms.  Reports hoarseness in his voice.  Denies any difficulty swallowing.  No drooling no neck stiffness no fevers no chills.    ALLERGIES:  ALLERGIES:   Allergen Reactions   • Lisinopril-Hydrochlorothiazide Other (See Comments)     Unknown       MEDICATIONS:  Current Outpatient Medications   Medication Sig   • methylPREDNISolone (MEDROL DOSEPAK) 4 MG tablet Take 1 tablet by mouth as directed. follow package directions   • amoxicillin-clavulanate (Augmentin) 875-125 MG per tablet Take 1 tablet by mouth every 12 hours for 7 days. Take with food.   • cyclobenzaprine (FLEXERIL) 10 MG tablet Take 1 tablet by mouth 3 times daily as needed for Muscle spasms.   • diclofenac (VOLTAREN) 1 % gel Apply 4 g topically 3 times daily as needed (for pain). Please dispense 1 each/tub of voltaren gel.   • levothyroxine 50 MCG tablet Take 1 tablet by mouth daily.   • levothyroxine 200 MCG tablet Take 1 tablet by mouth daily.   • omeprazole (PriLOSEC) 40 MG capsule Take 1 capsule by mouth daily.   • polyethylene glycol (MIRALAX) 17 GM/SCOOP powder Take 17 g and dissolve in 8 ounces of water or juice daily   • bisoprolol (ZEBETA) 10 MG tablet Take 1 tablet by mouth daily.   • omega-3 acid ethyl esters (LOVAZA) 1 g capsule Take 2 capsules by mouth every 12 hours.   • rivaroxaban (Xarelto) 20 MG Tab Take 1 tablet by mouth daily (with breakfast).   • clobetasol (TEMOVATE) 0.05 % ointment APPLY TOPICALLY TO THE SKIN 3 TIMES WEEKLY   • fluticasone propionate (Flovent HFA) 110 MCG/ACT inhaler Inhale 2 puffs into the lungs 2 times  Problem: Patient Care Overview  Goal: Plan of Care Review  Outcome: Ongoing (interventions implemented as appropriate)   06/05/18 1013   Coping/Psychosocial   Plan of Care Reviewed With patient;father   OTHER   Outcome Summary Initial PT evaluation completed this session. Pt able to perform STS x2 with max A x 3, but was unable to come to full upright posture. Pt is limited by increased pain at surgical site, PLOF, and belief in his post-surgical functional abilities. Pt req max encouragement to perform functional mobility tasks. Pt would benefit from skilled PT services according to PT POC. PT recommendation for discharge destination is home with assist/home health.           Alcohol use: No   • Drug use: No       Patient's medications, allergies, past medical, surgical, and social history  were reviewed and updated as appropriate.  Visit Vitals  BP (!) 154/89   Pulse 99   Temp 97.3 °F (36.3 °C) (Temporal)   Resp 18   SpO2 100%        Review of Systems    Cough congestion, hoarseness in voice, sore throat otherwise rest of the review of systems negative except as stated above.    Physical Exam  General appearance: alert, appears stated age and cooperative  Head: Normocephalic, without obvious abnormality, atraumatic  Eyes: conjunctivae/corneas clear. PERRL, EOM's intact.   Ears: normal TM's and external ear canals both ears  Nose: moderate congestion, turbinates red, swollen, edematous  Throat: abnormal findings: mild oropharyngeal erythema and tonsillar hypertrophy 2+  Neck: supple, full range of motion, no lymphadenopathy   Lungs: clear to auscultation bilaterally. No wheezing, rhonchi or crackles . No chest wall retractions. No respiratory distress. No tachypnea noted. .No wheezing, rhonchi or crackles   Heart: S1, S2 normal, no murmur, click, rub or gallop, regular rate and rhythm          Orders Placed This Encounter   • POCT Rapid strep A   • methylPREDNISolone (MEDROL DOSEPAK) 4 MG tablet   • amoxicillin-clavulanate (Augmentin) 875-125 MG per tablet     Labs:  Results for orders placed or performed in visit on 09/03/22   POCT RAPID STREP A   Result Value    GRP A STREP Negative    Internal Procedural Controls Acceptable Yes        Imaging:  No results found.       Assessment     Eric was seen today for sore throat and congestion.    Diagnoses and all orders for this visit:    Sore throat  -     POCT RAPID STREP A    Subacute sinusitis, unspecified location    Hoarseness of voice    Other orders  -     methylPREDNISolone (MEDROL DOSEPAK) 4 MG tablet; Take 1 tablet by mouth as directed. follow package directions  -     amoxicillin-clavulanate (Augmentin) 875-125 MG per tablet; Take  1 tablet by mouth every 12 hours for 7 days. Take with food.        Medical Decision Making:  MDM   Patient with sinus symptoms for the past 1 month associated with sore throat for the past 1 month.  Likely sinus related pressure causing him to have sore throat.  Prescribed Medrol Dosepak, Augmentin for now.  Follow-up PCP, ENT in a week for recheck and further evaluation.         Return precautions discussed with the patient. If has any worsening symptoms or new symptoms recommended to go to the nearest ER. Patient verbalized understanding and agrees with the plan.   Instructions provided as documented in the AVS.      Pravin Modi MD 1:41 PM

## 2023-03-09 NOTE — ED PROVIDER NOTES
Followed by neurology, neurosurgery      Subjective   37-year-old male presents to the ED today for chest pain.  He states it started approximately 2-1/2 hours ago while he was laying on the couch.  He states it is in the center of his chest and it is a squeezing pain.  He states it radiates to the back of both of his arms.  He took 4 nitroglycerin at home with no relief.  He states he did feel short of breath but that has improved.  He denies any nausea or vomiting.  He states he felt hot but did not have any diaphoresis.  He states he chronically has lower extremity edema but it is no worse than normal.  He did receive 324 mg of aspirin via EMS.  He states his pain started out as a 7 out of 10 but is now currently a 5 out of 10.  He has a history of coronary artery disease and had a stent placed in his LAD in August 2016.  He states he has a history of hypertension and high cholesterol.  He states he is not a diabetic.  He last had a heart catheter in September 2017 which showed nonobstructive coronary artery disease and his stent was patent.  He was admitted to [facility] February 3 for a non-STEMI.  No heart catheter was performed at that time and medical management was optimized.  His most recent echo showed an EF of 40-45%.  He states he doesn't regularly follow up with a cardiologist but the last cardiologist he saw was Dr. Hightower.  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        History provided by:  Patient  Chest Pain   Pain location:  Substernal area  Pain quality: tightness    Pain radiates to:  L arm and R arm  Pain severity:  Severe  Onset quality:  Sudden  Duration:  2 hours  Timing:  Constant  Progression:  Improving  Chronicity:  Recurrent  Relieved by:  Nothing  Worsened by:  Nothing  Ineffective treatments:  Nitroglycerin  Associated symptoms: lower extremity edema (chronic) and shortness of breath    Associated symptoms: no abdominal pain, no altered mental status, no anorexia, no  anxiety, no back pain, no claudication, no cough, no diaphoresis, no dizziness, no dysphagia, no fatigue, no fever, no headache, no heartburn, no nausea, no near-syncope, no numbness, no orthopnea, no palpitations, no PND, no syncope, no vomiting and no weakness    Risk factors: coronary artery disease, high cholesterol, hypertension, male sex and obesity    Risk factors: no diabetes mellitus, no prior DVT/PE and no smoking        Review of Systems   Constitutional: Negative for diaphoresis, fatigue and fever.   HENT: Negative for trouble swallowing.    Eyes: Negative.    Respiratory: Positive for chest tightness and shortness of breath. Negative for cough.    Cardiovascular: Positive for chest pain and leg swelling (chronic). Negative for palpitations, orthopnea, claudication, syncope, PND and near-syncope.   Gastrointestinal: Negative for abdominal pain, anorexia, heartburn, nausea and vomiting.   Genitourinary: Negative.    Musculoskeletal: Negative for back pain.   Skin: Negative.    Neurological: Negative for dizziness, weakness, numbness and headaches.   Psychiatric/Behavioral: Negative.    All other systems reviewed and are negative.      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       Allergies   Allergen Reactions   • Benadryl [Diphenhydramine] Itching     IV benadryl per patient       Past Surgical History:   Procedure Laterality Date   • BONE MARROW TRANSPLANT     • CARDIAC CATHETERIZATION N/A 8/22/2016    Procedure: Left Heart Cath;  Surgeon: Devorah Hightower MD;  Location:  [facility];  Service:    • CARDIAC CATHETERIZATION N/A 9/23/2017    Procedure: Left Heart Cath;  Surgeon: Devorah Hightower MD;  Location:  [facility];  Service:    • CORONARY STENT PLACEMENT         Family History   Problem Relation Age of Onset   • Hypertension Father    • Cancer Father       Colon   • Arthritis Mother    • No Known Problems Sister        Social History     Social History   • Marital status:      Social History Main Topics   • Smoking status: Never Smoker   • Alcohol use No   • Drug use: No     Other Topics Concern   • Not on file           Objective   Physical Exam   Constitutional: He is oriented to person, place, and time. He appears well-developed and well-nourished. No distress.   HENT:   Head: Normocephalic and atraumatic.   Right Ear: External ear normal.   Left Ear: External ear normal.   Nose: Nose normal.   Mouth/Throat: Oropharynx is clear and moist.   Eyes: Conjunctivae and EOM are normal. Pupils are equal, round, and reactive to light.   Neck: Normal range of motion. Neck supple. No JVD present. No tracheal deviation present.   Cardiovascular: Normal rate, regular rhythm, normal heart sounds and intact distal pulses.    Pulmonary/Chest: Effort normal and breath sounds normal. No respiratory distress. He has no wheezes. He has no rales. He exhibits tenderness (mild to lower sternum with palpation).   Abdominal: Soft. Bowel sounds are normal. He exhibits no distension. There is no tenderness. There is no rebound and no guarding.   Musculoskeletal: Normal range of motion. He exhibits edema (1+ edema to bilateral lower extremities). He exhibits no tenderness.   Neurological: He is alert and oriented to person, place, and time.   Skin: Skin is warm and dry. Capillary refill takes less than 2 seconds.   Psychiatric: He has a normal mood and affect. His behavior is normal. Judgment and thought content normal.   Nursing note and vitals reviewed.      Procedures         ED Course  ED Course   Value Comment By Time   ECG 12 Lead 15:41 - sinus rhythm, rate of 80, no ST elevation or depression, no acute ischemia, reviewed by ROBBIN Peck 03/15 1556    CXR shows no acute findings per ROBBIN Gottlieb 03/15 6889    Will order second troponin when due  ROBBIN Monique 03/15 1737    Patient reports his pain has improved but not completely gone, will give additional dose of morphine ROBBIN Monique 03/15 1745    Patient's troponin has increased, states pain is a 3/10.  He prefers admission to [facility].  Will call cardiology there about possible transfer. ROBBIN Monique 03/15 1943    I discussed with Dr. Hightower.  He is willing to accept the patient to [facility], however there are no beds currently.  He advised to start the patient on Heparin and Nitro drip since he is still having pain.  He states he will be able to bring the patient to the [facility] unit tomorrow.  I discussed with Dr. Crook who will admit the patient to [facility]. ROBBIN Monique 03/15 2012                  [facility]  Number of Diagnoses or Management Options  Acute coronary syndrome:      Amount and/or Complexity of Data Reviewed  Clinical lab tests: reviewed  Tests in the radiology section of CPT®: reviewed  Tests in the medicine section of CPT®: reviewed  Decide to obtain previous medical records or to obtain history from someone other than the patient: yes  Discuss the patient with other providers: yes    Patient Progress  Patient progress: stable      Final diagnoses:   Acute coronary syndrome            ROBBIN Monique  03/15/18 2014

## (undated) DEVICE — SOL NACL 0.9PCT 1000ML

## (undated) DEVICE — GUIDE CATHETER: Brand: MACH1™

## (undated) DEVICE — TREK CORONARY DILATATION CATHETER 3.0 MM X 15 MM / RAPID-EXCHANGE: Brand: TREK

## (undated) DEVICE — TUBING, SUCTION, 1/4" X 10', STRAIGHT: Brand: MEDLINE

## (undated) DEVICE — CATH DIAG EXPO M/ PK 6FR FL4/FR4 PIG 3PK

## (undated) DEVICE — GLIDESHEATH SLENDER STAINLESS STEEL KIT: Brand: GLIDESHEATH SLENDER

## (undated) DEVICE — TREK CORONARY DILATATION CATHETER 2.50 MM X 15 MM / RAPID-EXCHANGE: Brand: TREK

## (undated) DEVICE — CATH DIAG EXPO .045 FL3.5 5F 100CM

## (undated) DEVICE — MINI TREK CORONARY DILATATION CATHETER 2.0 MM X 15 MM / RAPID-EXCHANGE: Brand: MINI TREK

## (undated) DEVICE — DRSNG WND BORDR/ADHS NONADHR/GZ LF 4X14IN STRL

## (undated) DEVICE — INTRO SHEATH PRELUDE IDEAL SPRNG COIL 021 6F 23X80CM

## (undated) DEVICE — SUT ETHIB 2/0 SH SH 36IN X523H

## (undated) DEVICE — BNDG ELAS CO-FLEX SLF ADHR 4IN5YD LF STRL

## (undated) DEVICE — CATH DIAG EXPO M/ PK 5F FL4/FR4 PIG

## (undated) DEVICE — CATH ASPIR EXPORT AP .014IN 6F140CM

## (undated) DEVICE — SHEATH INTRO SUPERSHEATH JWIRE .035 8F 11CM

## (undated) DEVICE — DRP SLUSH MACH

## (undated) DEVICE — ELECTRD BLD EZ CLN STD 2.5IN

## (undated) DEVICE — CATH DIAG EXPO .056 FL3.5 6F 100CM

## (undated) DEVICE — 2963 MEDIPORE SOFT CLOTH TAPE 3 IN X 10 YD 12 RLS/CS: Brand: 3M™ MEDIPORE™

## (undated) DEVICE — GW INQWIRE FC PTFE J/3MM .035 180

## (undated) DEVICE — SOL NS 500ML

## (undated) DEVICE — GW INQWIRE FC PTFE STD J/1.5 .035 260

## (undated) DEVICE — SUT SILK 2/0 TIES 18IN A185H

## (undated) DEVICE — CLTH CLENS READYCLEANSE PERI CARE PK/5

## (undated) DEVICE — SUT PROLN 7/0 8747H

## (undated) DEVICE — SUT SILK B CARDIO BB 5/0 30IN K880H BX/36

## (undated) DEVICE — PK CATH CARD 10

## (undated) DEVICE — SUT SILK 0/0 CT2 18IN C027D

## (undated) DEVICE — ST INF PRI SMRTSTE 20DRP 2VLV 24ML 117

## (undated) DEVICE — 6F .070 XB 3 100CM: Brand: VISTA BRITE TIP

## (undated) DEVICE — BNDG ELAS CO-FLEX SLF ADHR 6IN 5YD LF STRL

## (undated) DEVICE — VASOVIEW HEMOPRO: Brand: VASOVIEW HEMOPRO

## (undated) DEVICE — Device

## (undated) DEVICE — Device: Brand: MEDEX

## (undated) DEVICE — CATH IMG DRAGONFLY OPTIS 2.7F 135CM

## (undated) DEVICE — KT MINI ACC 5F .18X40CM SS 21G 7CM

## (undated) DEVICE — ST EXT IV SMARTSITE 2VLV SP M LL 5ML IV1

## (undated) DEVICE — DR ROGERS OH: Brand: MEDLINE INDUSTRIES, INC.

## (undated) DEVICE — ADULT DISPOSABLE SINGLE-PATIENT USE PULSE OXIMETER SENSOR: Brand: NONIN

## (undated) DEVICE — SUT SILK 2 SUTUPAK TIE 60IN SA8H 2STRAND

## (undated) DEVICE — PRESSURE MONITORING SET: Brand: TRUWAVE

## (undated) DEVICE — NC TREK CORONARY DILATATION CATHETER 3.5 MM X 15 MM / RAPID-EXCHANGE: Brand: NC TREK

## (undated) DEVICE — MODEL AT P54, P/N 700608-035KIT CONTENTS: HAND CONTROLLER, 3-WAY HIGH-PRESSURE STOPCOCK WITH ROTATING END AND PREMIUM HIGH-PRESSURE TUBING: Brand: ANGIOTOUCH® KIT

## (undated) DEVICE — GW PRESS VERRATA STR 185CM

## (undated) DEVICE — MODEL BT2000 P/N 700287-012KIT CONTENTS: MANIFOLD WITH SALINE AND CONTRAST PORTS, SALINE TUBING WITH SPIKE AND HAND SYRINGE, TRANSDUCER: Brand: BT2000 AUTOMATED MANIFOLD KIT

## (undated) DEVICE — MODEL AT P65, P/N 701554-001KIT CONTENTS: HAND CONTROLLER, 3-WAY HIGH-PRESSURE STOPCOCK WITH ROTATING END AND PREMIUM HIGH-PRESSURE TUBING: Brand: ANGIOTOUCH® KIT

## (undated) DEVICE — Device: Brand: ASAHI SION BLUE

## (undated) DEVICE — DEV COMP RAD PRELUDESYNC 24CM

## (undated) DEVICE — SUT PROLN 6/0 C1 D/A 30IN 8706H

## (undated) DEVICE — SWAN-GANZ BIPOLAR PACING CATHETER: Brand: SWAN-GANZ

## (undated) DEVICE — SUT ETHIB 1 CTX CR8 18IN CX30D

## (undated) DEVICE — GW LUGE .014 182 CM

## (undated) DEVICE — INTRO SHEATH ENGAGE TR SS/STD .025 6F 12CM

## (undated) DEVICE — A2000 MULTI-USE SYRINGE KIT, P/N 701277-003KIT CONTENTS: 100ML CONTRAST RESERVOIR AND TUBING WITH CONTRAST SPIKE AND CLAMP: Brand: A2000 MULTI-USE SYRINGE KIT

## (undated) DEVICE — SUT PROLN 7/0 BV1 D/A 24IN 8702H

## (undated) DEVICE — SUT SILK 4/0 TIES 18IN A183H

## (undated) DEVICE — DEV INFL MONARCH 25W

## (undated) DEVICE — DRSNG SURESITE WNDW 4X4.5

## (undated) DEVICE — CANNULA,OXY,ADULT,SUPER SOFT,W/14'TUB,UC: Brand: MEDLINE INDUSTRIES, INC.

## (undated) DEVICE — MEDI-VAC NON-CONDUCTIVE SUCTION TUBING: Brand: CARDINAL HEALTH

## (undated) DEVICE — SUT SILK 2/0 CT1 CR8 18IN C022D

## (undated) DEVICE — SUCTION CANISTER, 2500CC, RIGID: Brand: DEROYAL

## (undated) DEVICE — SHEATH INTRO SUPERSHEATH JWIRE .035 7F 11CM

## (undated) DEVICE — PK HEART OPN 10

## (undated) DEVICE — PRESSURE MONITORING SET: Brand: TRUWAVE, VAMP

## (undated) DEVICE — ENCORE® LATEX MICRO SIZE 7, STERILE LATEX POWDER-FREE SURGICAL GLOVE: Brand: ENCORE

## (undated) DEVICE — INTRAOPERATIVE COVER KIT, 10 PACK: Brand: SITE-RITE

## (undated) DEVICE — ANTIBACTERIAL UNDYED BRAIDED (POLYGLACTIN 910), SYNTHETIC ABSORBABLE SUTURE: Brand: COATED VICRYL

## (undated) DEVICE — SHEATH INTRO SUPERSHEATH JWIRE .035 6F 11CM

## (undated) DEVICE — THE PRONTO LP CATHETER IS INDICATED FOR THE REMOVAL OF FRESH, SOFT EMBOLI AND THROMBI FROM VESSELS IN THE CORONARY AND PERIPHERAL SYSTEM.: Brand: PRONTO® LP EXTRACTION CATHETER

## (undated) DEVICE — CATH F5 INF JR 4 100CM: Brand: INFINITI

## (undated) DEVICE — GW FC FLOP/TP .035 260CM 3MM

## (undated) DEVICE — PINNACLE INTRODUCER SHEATH: Brand: PINNACLE

## (undated) DEVICE — PK CATH CARD 70

## (undated) DEVICE — RUNTHROUGH NS EXTRA FLOPPY PTCA GUIDEWIRE: Brand: RUNTHROUGH

## (undated) DEVICE — CATH F5 INF JL 4 100CM: Brand: INFINITI

## (undated) DEVICE — DRSNG SURESITE123 6X8IN